# Patient Record
Sex: MALE | Race: WHITE | Employment: OTHER | URBAN - METROPOLITAN AREA
[De-identification: names, ages, dates, MRNs, and addresses within clinical notes are randomized per-mention and may not be internally consistent; named-entity substitution may affect disease eponyms.]

---

## 2017-01-11 ENCOUNTER — HOSPITAL ENCOUNTER (OUTPATIENT)
Dept: LAB | Age: 82
Discharge: HOME | End: 2017-01-11
Payer: MEDICARE

## 2017-01-11 PROCEDURE — 84439 ASSAY OF FREE THYROXINE: CPT

## 2017-01-11 PROCEDURE — 81001 URINALYSIS AUTO W/SCOPE: CPT

## 2017-01-11 PROCEDURE — 84443 ASSAY THYROID STIM HORMONE: CPT

## 2017-01-11 PROCEDURE — 36415 COLL VENOUS BLD VENIPUNCTURE: CPT

## 2017-01-11 PROCEDURE — 80053 COMPREHEN METABOLIC PANEL: CPT

## 2017-01-11 PROCEDURE — 80061 LIPID PANEL: CPT

## 2017-01-19 ENCOUNTER — TELEPHONE (OUTPATIENT)
Dept: INTERNAL MEDICINE CLINIC | Age: 82
End: 2017-01-19

## 2017-01-19 RX ORDER — LEVOTHYROXINE SODIUM 150 UG/1
150 TABLET ORAL
Qty: 90 TAB | Refills: 1 | Status: SHIPPED | OUTPATIENT
Start: 2017-01-19 | End: 2018-02-19 | Stop reason: SDUPTHER

## 2017-01-19 NOTE — TELEPHONE ENCOUNTER
----- Message from Praveen Story MD sent at 1/16/2017  9:23 PM EST -----  Increase the synthroid to 150,  labs in 6 months.

## 2017-01-19 NOTE — TELEPHONE ENCOUNTER
Spoke with pt and spouse in ref to recent lab results. Thyroid not at goal. Need to adjust levothyroxine from 125 mcg to 150 mcg daily and repeat labs in 6 months. Rx for new dose sent to pharm. I have reviewed the provider's instructions with the patient, answering all questions to his satisfaction. This medication was authorized by verbal order by Dr. Tobias Mireles.

## 2017-02-08 ENCOUNTER — TELEPHONE (OUTPATIENT)
Dept: INTERNAL MEDICINE CLINIC | Age: 82
End: 2017-02-08

## 2017-02-08 NOTE — TELEPHONE ENCOUNTER
Pt wife informed that Drew Martínez does not return until 2/16 and due to pt age will probably not recommend Ambien.

## 2017-02-08 NOTE — TELEPHONE ENCOUNTER
[phone] pt's wife says that her  is having some problems sleeping and would like dr Suraj Wagner to give him a rx for Lenalphonse Yolanda.

## 2017-02-17 ENCOUNTER — TELEPHONE (OUTPATIENT)
Dept: INTERNAL MEDICINE CLINIC | Age: 82
End: 2017-02-17

## 2017-02-20 NOTE — TELEPHONE ENCOUNTER
Spoke with pt spouse who is on HIPPA. 2 pt identifiers. Advised that [address] does not want pt to take Ambien due to age and increase risk of falls. To try Melatonin 1-2 mg QHS. Verbalized understanding.

## 2017-04-20 RX ORDER — PRAVASTATIN SODIUM 20 MG/1
TABLET ORAL
Qty: 90 TAB | Refills: 0 | Status: SHIPPED | OUTPATIENT
Start: 2017-04-20 | End: 2017-06-02 | Stop reason: SDUPTHER

## 2017-05-03 ENCOUNTER — TELEPHONE (OUTPATIENT)
Dept: INTERNAL MEDICINE CLINIC | Age: 82
End: 2017-05-03

## 2017-06-02 ENCOUNTER — OFFICE VISIT (OUTPATIENT)
Dept: INTERNAL MEDICINE CLINIC | Age: 82
End: 2017-06-02

## 2017-06-02 VITALS
OXYGEN SATURATION: 96 % | DIASTOLIC BLOOD PRESSURE: 68 MMHG | WEIGHT: 168.4 LBS | HEART RATE: 71 BPM | SYSTOLIC BLOOD PRESSURE: 112 MMHG | HEIGHT: 70 IN | BODY MASS INDEX: 24.11 KG/M2 | TEMPERATURE: 98.7 F | RESPIRATION RATE: 16 BRPM

## 2017-06-02 DIAGNOSIS — E78.00 HYPERCHOLESTEROLEMIA: ICD-10-CM

## 2017-06-02 DIAGNOSIS — C91.10 CLL (CHRONIC LYMPHOCYTIC LEUKEMIA) (HCC): ICD-10-CM

## 2017-06-02 DIAGNOSIS — I10 ESSENTIAL HYPERTENSION: ICD-10-CM

## 2017-06-02 DIAGNOSIS — R68.81 EARLY SATIETY: ICD-10-CM

## 2017-06-02 DIAGNOSIS — E03.4 HYPOTHYROIDISM DUE TO ACQUIRED ATROPHY OF THYROID: Primary | ICD-10-CM

## 2017-06-02 DIAGNOSIS — J06.9 UPPER RESPIRATORY TRACT INFECTION, UNSPECIFIED TYPE: ICD-10-CM

## 2017-06-02 DIAGNOSIS — K29.01 GASTROINTESTINAL HEMORRHAGE ASSOCIATED WITH ACUTE GASTRITIS: ICD-10-CM

## 2017-06-02 RX ORDER — MIRTAZAPINE 7.5 MG/1
7.5 TABLET, FILM COATED ORAL
Qty: 30 TAB | Refills: 1 | Status: SHIPPED | OUTPATIENT
Start: 2017-06-02 | End: 2017-11-17 | Stop reason: DRUGHIGH

## 2017-06-02 RX ORDER — MAGNESIUM 200 MG
1000 TABLET ORAL DAILY
COMMUNITY

## 2017-06-02 RX ORDER — PRAVASTATIN SODIUM 20 MG/1
TABLET ORAL
Qty: 90 TAB | Refills: 3 | Status: SHIPPED | OUTPATIENT
Start: 2017-06-02 | End: 2018-01-03 | Stop reason: SDUPTHER

## 2017-06-02 RX ORDER — ALBUTEROL SULFATE 90 UG/1
2 AEROSOL, METERED RESPIRATORY (INHALATION)
Qty: 1 INHALER | Refills: 0 | COMMUNITY
Start: 2017-06-02 | End: 2018-05-03 | Stop reason: SDUPTHER

## 2017-06-02 RX ORDER — GUAIFENESIN 600 MG/1
1200 TABLET, EXTENDED RELEASE ORAL 2 TIMES DAILY
COMMUNITY
Start: 2017-06-02 | End: 2017-07-28

## 2017-06-02 NOTE — PROGRESS NOTES
HPI:  Susanna Portillo is a 80y.o. year old male who is here for a followup visit. Ongoing issues with decreased appetite and weight down. Sleep is an issue off and on. One week of cough with little sputum. No fever or chills. No night sweats. No vomiting but decreased appetite. Bowels are good. No bleeding. No melena. No change in bowels or bladder. Some nocturia. Past Medical History:   Diagnosis Date    Asthma     Autoimmune disease ([facility]     CLL    Cancer ([facility] 6/20/14    MULTIPLE SCCAs EXCISED, ALSO BCCAs    CLL (chronic lymphocytic leukemia) (HCC)     no treatment    GERD (gastroesophageal reflux disease)     Hypercholesterolemia     Hypertension     Liver disease 6/6/2014    ADMITTED FOR LIVER DYSFUNCTION, NOT DIAGNOSED, NOW RESOLVED    [facility]    Thyroid disease     Unspecified adverse effect of anesthesia     \"out of it for 2 weeks\"    Unspecified sleep apnea     had sleep study but no follow up       Past Surgical History:   Procedure Laterality Date    HX ABDOMINAL LAPAROSCOPY  4/2016    HX COLONOSCOPY      X2, MOST RECNT WAS IN 2013    HX HEENT      tonsillectomy    HX HERNIA REPAIR  4/13/16    paraesophageal hernia repair Dr Ayana Walsh Left     Frozen shoulder manipulation       Prior to Admission medications    Medication Sig Start Date End Date Taking? Authorizing Provider   cyanocobalamin (VITAMIN B-12) 1,000 mcg sublingual tablet Take 1,000 mcg by mouth daily. Yes Historical Provider   pravastatin (PRAVACHOL) 20 mg tablet TAKE ONE TABLET BY MOUTH NIGHTLY 6/2/17  Yes Lori Woods III, MD   albuterol (PROVENTIL HFA, VENTOLIN HFA, PROAIR HFA) 90 mcg/actuation inhaler Take 2 Puffs by inhalation every six (6) hours as needed for Wheezing. 6/2/17  Yes Lori Woods III, MD   guaiFENesin ER (MUCINEX) 600 mg ER tablet Take 2 Tabs by mouth two (2) times a day.  6/2/17  Yes Lori Woods III, MD   mirtazapine (REMERON) 7.5 mg tablet Take 1 Tab by mouth nightly. 6/2/17  Yes Rachel Wyman III, MD   levothyroxine (SYNTHROID) 150 mcg tablet Take 1 Tab by mouth Daily (before breakfast). Dose adjustment 1/19/17  Yes Shazia Hannah MD   diphenoxylate-atropine (LOMOTIL) 2.5-0.025 mg per tablet Take  by mouth four (4) times daily as needed for Diarrhea. Yes Historical Provider   raNITIdine (ZANTAC 75) 75 mg tablet Take 75 mg by mouth nightly. Yes Historical Provider   hydrochlorothiazide (HYDRODIURIL) 25 mg tablet TAKE ONE TABLET BY MOUTH ONCE DAILY 6/6/16  Yes Rachel Wyman III, MD   amLODIPine (NORVASC) 10 mg tablet TAKE ONE TABLET BY MOUTH ONCE DAILY 6/6/16  Yes Rachel Wyman III, MD   aspirin delayed-release 81 mg tablet Take  by mouth daily. Yes Historical Provider   co-enzyme Q-10 (CO Q-10) 100 mg capsule Take 100 mg by mouth daily. Yes Historical Provider   calcium carbonate (TUMS) 200 mg calcium (500 mg) chew Take 1 Tab by mouth as needed (heartburn). Yes Historical Provider   MEN'S MULTI-VITAMIN PO Take 2 each by mouth daily. Yes Historical Provider   cholecalciferol, vitamin D3, (VITAMIN D3) 2,000 unit tab Take 1 tablet by mouth daily. Yes Historical Provider   colestipol (COLESTID) 1 gram tablet Take 1 g by mouth daily. 11/11/16   Historical Provider   OTHER RepairVite-SE 1 scoop daily for bowel support. Historical Provider   lactobacillus combination no.4 (PROBIOTIC) 3 billion cell cap Take  by mouth. Ultimate Melanie Probiotic 30 billion cx per cap - 1 pill per day. Historical Provider       Social History     Social History    Marital status:      Spouse name: N/A    Number of children: N/A    Years of education: N/A     Occupational History    Not on file.      Social History Main Topics    Smoking status: Never Smoker    Smokeless tobacco: Never Used    Alcohol use 2.5 oz/week     5 Standard drinks or equivalent per week      Comment: occasional    Drug use: No    Sexual activity: Not on file     Other Topics Concern    Not on file     Social History Narrative          ROS  Per HPI    Visit Vitals    /68    Pulse 71    Temp 98.7 °F (37.1 °C) (Oral)    Resp 16    Ht 5' 10\" (1.778 m)    Wt 168 lb 6.4 oz (76.4 kg)    SpO2 96%    BMI 24.16 kg/m2         Physical Exam   Physical Examination: General appearance - alert, well appearing, and in no distress  Mouth - mucous membranes moist, pharynx normal without lesions  Neck - supple, no significant adenopathy  Chest - clear to auscultation, no wheezes, rales or rhonchi, symmetric air entry  Heart - normal rate, regular rhythm, normal S1, S2, no murmurs, rubs, clicks or gallops  Abdomen - soft, nontender, nondistended, no masses or organomegaly  Neurological - alert, oriented, normal speech, no focal findings or movement disorder noted  Extremities - peripheral pulses normal, no pedal edema, no clubbing or cyanosis      Assessment/Plan:  Mike was seen today for cough and decreased appetite. Diagnoses and all orders for this visit:    Hypothyroidism due to acquired atrophy of thyroid - appears euthyroid except for weight. Check levels and adjust meds  -     TSH 3RD GENERATION  -     T4, FREE    CLL (chronic lymphocytic leukemia) (HCC) - recently had CBC with oncology    Hypercholesterolemia - check labs for LDL of 100  -     pravastatin (PRAVACHOL) 20 mg tablet; TAKE ONE TABLET BY MOUTH NIGHTLY  -     LIPID PANEL    Essential hypertension - Bp controlled. -     METABOLIC PANEL, COMPREHENSIVE  -     UA/M W/RFLX CULTURE, ROUTINE    Early satiety - will get UGI to see if his prior surgery is causing issues with stomach and emptying for followup. Trial of appetite stimulant for now. -     mirtazapine (REMERON) 7.5 mg tablet; Take 1 Tab by mouth nightly.   -     XR UPPER GI W KUB/ BA SWALLOW; Future    Gastrointestinal hemorrhage associated with acute gastritis  -     XR UPPER GI W KUB/ BA SWALLOW; Future    Upper respiratory tract infection, unspecified type - use mucinex as well as albuterol as needed for now. Call for fever. Follow-up Disposition: as needed -4-6 weeks. Advised him to call back or return to office if symptoms worsen/change/persist.  Discussed expected course/resolution/complications of diagnosis in detail with patient. Medication risks/benefits/costs/interactions/alternatives discussed with patient. He was given an after visit summary which includes diagnoses, current medications, & vitals. He expressed understanding with the diagnosis and plan.

## 2017-06-02 NOTE — MR AVS SNAPSHOT
Visit Information Date & Time Provider Department Dept. Phone Encounter #  
 6/2/2017 11:00 AM Suzanne Rosenthal MD [facility] Internal Medicine [phone] [id number] Upcoming Health Maintenance Date Due ZOSTER VACCINE AGE 60> 3/17/1991 INFLUENZA AGE 9 TO ADULT 8/1/2017 GLAUCOMA SCREENING Q2Y 12/16/2017 MEDICARE YEARLY EXAM 12/22/2017 DTaP/Tdap/Td series (2 - Td) 6/14/2026 Allergies as of 6/2/2017  Review Complete On: 6/2/2017 By: Alfred Fountain LPN Severity Noted Reaction Type Reactions Other Food  06/20/2014    Other (comments) AVOIDS OTHER SPICES IN GENERAL DUE SEVERE REACTIONS TO OREGANO AND MUSTARD Mustard High 06/20/2014    Anaphylaxis THROAT CLOSES Oregano High 06/20/2014    Anaphylaxis THROAT CLOSES Zithromax [Azithromycin] High 03/30/2011    Anaphylaxis Pcn [Penicillins]  03/30/2011    Unable to Obtain Current Immunizations  Reviewed on 8/25/2016 Name Date Influenza High Dose Vaccine PF 10/26/2016 Influenza Vaccine 10/15/2015, 10/22/2014, 10/1/2013 Pneumococcal Conjugate (PCV-13) 5/18/2015 Pneumococcal Vaccine (Unspecified Type) 10/1/2011 Tdap 6/14/2016 Not reviewed this visit You Were Diagnosed With   
  
 Codes Comments Hypothyroidism due to acquired atrophy of thyroid    -  Primary ICD-10-CM: E03.4 ICD-9-CM: 244.8, 246.8 CLL (chronic lymphocytic leukemia) (HCC)     ICD-10-CM: C91.10 ICD-9-CM: 204.10 Hypercholesterolemia     ICD-10-CM: E78.00 ICD-9-CM: 272.0 Essential hypertension     ICD-10-CM: I10 
ICD-9-CM: 401.9 Early satiety     ICD-10-CM: R68.81 ICD-9-CM: 780.94 Gastrointestinal hemorrhage associated with acute gastritis     ICD-10-CM: K29.01 
ICD-9-CM: 535.01 Upper respiratory tract infection, unspecified type     ICD-10-CM: J06.9 ICD-9-CM: 465.9 Vitals BP Pulse Temp Resp Height(growth percentile) Weight(growth percentile) 112/68 71 98.7 °F (37.1 °C) (Oral) 16 5' 10\" (1.778 m) 168 lb 6.4 oz (76.4 kg) SpO2 BMI Smoking Status 96% 24.16 kg/m2 Never Smoker Vitals History BMI and BSA Data Body Mass Index Body Surface Area  
 24.16 kg/m 2 1.94 m 2 Preferred Pharmacy Pharmacy Name Phone [phone] [phone] [facility] [phone] Your Updated Medication List  
  
   
This list is accurate as of: 6/2/17 11:48 AM.  Always use your most recent med list.  
  
  
  
  
 albuterol 90 mcg/actuation inhaler Commonly known as:  PROVENTIL HFA, VENTOLIN HFA, PROAIR HFA Take 2 Puffs by inhalation every six (6) hours as needed for Wheezing. amLODIPine 10 mg tablet Commonly known as:  Ingebethanie Denise TAKE ONE TABLET BY MOUTH ONCE DAILY  
  
 aspirin delayed-release 81 mg tablet Take  by mouth daily. calcium carbonate 200 mg calcium (500 mg) Chew Commonly known as:  TUMS Take 1 Tab by mouth as needed (heartburn). co-enzyme Q-10 100 mg capsule Commonly known as:  CO Q-10 Take 100 mg by mouth daily. colestipol 1 gram tablet Commonly known as:  COLESTID Take 1 g by mouth daily. guaiFENesin  mg ER tablet Commonly known as:  [employer] Take 2 Tabs by mouth two (2) times a day. hydroCHLOROthiazide 25 mg tablet Commonly known as:  HYDRODIURIL  
TAKE ONE TABLET BY MOUTH ONCE DAILY  
  
 levothyroxine 150 mcg tablet Commonly known as:  SYNTHROID Take 1 Tab by mouth Daily (before breakfast). Dose adjustment LOMOTIL 2.5-0.025 mg per tablet Generic drug:  diphenoxylate-atropine Take  by mouth four (4) times daily as needed for Diarrhea. MEN'S MULTI-VITAMIN PO Take 2 each by mouth daily. mirtazapine 7.5 mg tablet Commonly known as:  Sharyle Meadows Take 1 Tab by mouth nightly. OTHER RepairVite-SE 1 scoop daily for bowel support. pravastatin 20 mg tablet Commonly known as:  PRAVACHOL  
 TAKE ONE TABLET BY MOUTH NIGHTLY PROBIOTIC 3 billion cell Cap Generic drug:  lactobacillus combination no.4 Take  by mouth. Ultimate Melanie Probiotic 30 billion cx per cap - 1 pill per day. VITAMIN B-12 1,000 mcg sublingual tablet Generic drug:  cyanocobalamin Take 1,000 mcg by mouth daily. VITAMIN D3 2,000 unit Tab Generic drug:  cholecalciferol (vitamin D3) Take 1 tablet by mouth daily. ZANTAC 75 75 mg tablet Generic drug:  raNITIdine Take 75 mg by mouth nightly. Prescriptions Sent to Pharmacy Refills  
 pravastatin (PRAVACHOL) 20 mg tablet 3 Sig: TAKE ONE TABLET BY MOUTH NIGHTLY Class: Normal  
 Pharmacy: [address] Ph #: [phone]  
 mirtazapine (REMERON) 7.5 mg tablet 1 Sig: Take 1 Tab by mouth nightly. Class: Normal  
 Pharmacy: [address] [place] [address], [address] [place] [address] Ph #: [phone] Route: Oral  
  
We Performed the Following LIPID PANEL [95321 CPT(R)] METABOLIC PANEL, COMPREHENSIVE [26063 CPT(R)] T4, FREE [id number] CPT(R)] TSH 3RD GENERATION [68923 CPT(R)] UA/M W/RFLX CULTURE, ROUTINE [[id number] Custom] To-Do List   
 06/02/2017 Imaging:  XR UPPER GI W KUB/ BA SWALLOW Introducing [facility]! Dear Isadora Anthony: Thank you for requesting a [employer] account. Our records indicate that you already have an active [employer] account. You can access your account anytime at [email or web address] Did you know that you can access your hospital and ER discharge instructions at any time in [employer]? You can also review all of your test results from your hospital stay or ER visit. Additional Information If you have questions, please visit the Frequently Asked Questions section of the [employer] website at [email or web address] Remember, [employer] is NOT to be used for urgent needs.  For medical emergencies, dial 911. Now available from your iPhone and Android! Please provide this summary of care documentation to your next provider. Your primary care clinician is listed as Alee [phone] If you have any questions after today's visit, please call [phone].

## 2017-06-07 ENCOUNTER — HOSPITAL ENCOUNTER (OUTPATIENT)
Dept: GENERAL RADIOLOGY | Age: 82
Discharge: HOME | End: 2017-06-07
Attending: INTERNAL MEDICINE
Payer: MEDICARE

## 2017-06-07 DIAGNOSIS — R68.81 EARLY SATIETY: ICD-10-CM

## 2017-06-07 DIAGNOSIS — K29.01 GASTROINTESTINAL HEMORRHAGE ASSOCIATED WITH ACUTE GASTRITIS: ICD-10-CM

## 2017-06-07 PROCEDURE — 74241 XR UPPER GI W KUB/ BA SWALLOW: CPT

## 2017-06-08 ENCOUNTER — TELEPHONE (OUTPATIENT)
Dept: INTERNAL MEDICINE CLINIC | Age: 82
End: 2017-06-08

## 2017-06-09 ENCOUNTER — TELEPHONE (OUTPATIENT)
Dept: INTERNAL MEDICINE CLINIC | Age: 82
End: 2017-06-09

## 2017-06-09 ENCOUNTER — HOSPITAL ENCOUNTER (OUTPATIENT)
Dept: LAB | Age: 82
Discharge: HOME | End: 2017-06-09
Payer: MEDICARE

## 2017-06-09 DIAGNOSIS — K21.9 GASTRIC REFLUX: ICD-10-CM

## 2017-06-09 DIAGNOSIS — R63.4 WEIGHT LOSS: Primary | ICD-10-CM

## 2017-06-09 DIAGNOSIS — R68.81 EARLY SATIETY: ICD-10-CM

## 2017-06-09 PROCEDURE — 80061 LIPID PANEL: CPT

## 2017-06-09 PROCEDURE — 80053 COMPREHEN METABOLIC PANEL: CPT

## 2017-06-09 PROCEDURE — 81001 URINALYSIS AUTO W/SCOPE: CPT

## 2017-06-09 PROCEDURE — 84439 ASSAY OF FREE THYROXINE: CPT

## 2017-06-09 PROCEDURE — 84443 ASSAY THYROID STIM HORMONE: CPT

## 2017-06-09 NOTE — TELEPHONE ENCOUNTER
Pt's wife called for her  to see if the results of his barium xray have come back. He is anxious to hear before the weekend.

## 2017-07-12 ENCOUNTER — TELEPHONE (OUTPATIENT)
Dept: INTERNAL MEDICINE CLINIC | Age: 82
End: 2017-07-12

## 2017-07-12 NOTE — TELEPHONE ENCOUNTER
Please call regarding results to his stomach x-ray. a speech therapist was suppose to be set up for him and never was.  She would like to discuss the information with you

## 2017-07-13 LAB
ALBUMIN SERPL-MCNC: 3.9 G/DL (ref 3.5–4.7)
ALBUMIN/GLOB SERPL: 1.8 {RATIO} (ref 1.2–2.2)
ALP SERPL-CCNC: 70 IU/L (ref 39–117)
ALT SERPL-CCNC: 18 IU/L (ref 0–44)
APPEARANCE UR: CLEAR
AST SERPL-CCNC: 27 IU/L (ref 0–40)
BACTERIA #/AREA URNS HPF: ABNORMAL /[HPF]
BILIRUB SERPL-MCNC: 0.3 MG/DL (ref 0–1.2)
BILIRUB UR QL STRIP: NEGATIVE
BUN SERPL-MCNC: 20 MG/DL (ref 8–27)
BUN/CREAT SERPL: 22 (ref 10–24)
CALCIUM SERPL-MCNC: 9.5 MG/DL (ref 8.6–10.2)
CASTS URNS QL MICRO: ABNORMAL /LPF
CHLORIDE SERPL-SCNC: 97 MMOL/L (ref 96–106)
CHOLEST SERPL-MCNC: 118 MG/DL (ref 100–199)
CO2 SERPL-SCNC: 25 MMOL/L (ref 18–29)
COLOR UR: YELLOW
CREAT SERPL-MCNC: 0.93 MG/DL (ref 0.76–1.27)
CRYSTALS URNS MICRO: ABNORMAL
EPI CELLS #/AREA URNS HPF: ABNORMAL /HPF
GLOBULIN SER CALC-MCNC: 2.2 G/DL (ref 1.5–4.5)
GLUCOSE SERPL-MCNC: 96 MG/DL (ref 65–99)
GLUCOSE UR QL: NEGATIVE
HDLC SERPL-MCNC: 52 MG/DL
HGB UR QL STRIP: NEGATIVE
KETONES UR QL STRIP: NEGATIVE
LDLC SERPL CALC-MCNC: 54 MG/DL (ref 0–99)
LEUKOCYTE ESTERASE UR QL STRIP: NEGATIVE
MICRO URNS: NORMAL
MICRO URNS: NORMAL
MUCOUS THREADS URNS QL MICRO: PRESENT
NITRITE UR QL STRIP: NEGATIVE
PH UR STRIP: 6.5 [PH] (ref 5–7.5)
POTASSIUM SERPL-SCNC: 4.1 MMOL/L (ref 3.5–5.2)
PROT SERPL-MCNC: 6.1 G/DL (ref 6–8.5)
PROT UR QL STRIP: NEGATIVE
RBC #/AREA URNS HPF: ABNORMAL /HPF
SODIUM SERPL-SCNC: 139 MMOL/L (ref 134–144)
SP GR UR: 1.03 (ref 1–1.03)
T4 FREE SERPL-MCNC: 1.38 NG/DL (ref 0.82–1.77)
TRIGL SERPL-MCNC: 61 MG/DL (ref 0–149)
TSH SERPL DL<=0.005 MIU/L-ACNC: 2.22 UIU/ML (ref 0.45–4.5)
UNIDENT CRYS URNS QL MICRO: PRESENT
URINALYSIS REFLEX, 377202: NORMAL
UROBILINOGEN UR STRIP-MCNC: 0.2 MG/DL (ref 0.2–1)
VLDLC SERPL CALC-MCNC: 12 MG/DL (ref 5–40)
WBC #/AREA URNS HPF: ABNORMAL /HPF

## 2017-07-13 NOTE — TELEPHONE ENCOUNTER
Spoke with pts spouse who is on HIPPA. 2 pt identifiers. I explained that I have faxed pt information to [address] [place] [address] for a speech therapy eval with confirmation received. She will contact me if she does not hear from them.

## 2017-07-24 ENCOUNTER — TELEPHONE (OUTPATIENT)
Dept: INTERNAL MEDICINE CLINIC | Age: 82
End: 2017-07-24

## 2017-07-24 DIAGNOSIS — I10 ESSENTIAL HYPERTENSION WITH GOAL BLOOD PRESSURE LESS THAN 140/90: ICD-10-CM

## 2017-07-24 RX ORDER — HYDROCHLOROTHIAZIDE 25 MG/1
TABLET ORAL
Qty: 90 TAB | Refills: 0 | Status: SHIPPED | OUTPATIENT
Start: 2017-07-24 | End: 2018-01-03 | Stop reason: SDUPTHER

## 2017-07-24 NOTE — TELEPHONE ENCOUNTER
The patient requested a copy of The study results of the  Barium x-ray  mailed to her only page 1 was sent please mailed page 2 3 and 4    Also thank you for the speech therapy it is working out well

## 2017-07-28 ENCOUNTER — TELEPHONE (OUTPATIENT)
Dept: INTERNAL MEDICINE CLINIC | Age: 82
End: 2017-07-28

## 2017-07-28 ENCOUNTER — OFFICE VISIT (OUTPATIENT)
Dept: INTERNAL MEDICINE CLINIC | Age: 82
End: 2017-07-28

## 2017-07-28 VITALS
DIASTOLIC BLOOD PRESSURE: 82 MMHG | RESPIRATION RATE: 16 BRPM | OXYGEN SATURATION: 99 % | HEART RATE: 76 BPM | TEMPERATURE: 98 F | BODY MASS INDEX: 24.45 KG/M2 | WEIGHT: 170.8 LBS | SYSTOLIC BLOOD PRESSURE: 120 MMHG | HEIGHT: 70 IN

## 2017-07-28 DIAGNOSIS — C91.10 CLL (CHRONIC LYMPHOCYTIC LEUKEMIA) (HCC): ICD-10-CM
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
## 2017-07-28 DIAGNOSIS — I10 ESSENTIAL HYPERTENSION: ICD-10-CM

## 2017-07-28 DIAGNOSIS — R68.89 SUSPECTED ERYTHEMA CHRONICA MIGRANS: Primary | ICD-10-CM

## 2017-07-28 RX ORDER — DOXYCYCLINE 100 MG/1
100 TABLET ORAL 2 TIMES DAILY
Qty: 20 TAB | Refills: 0 | Status: SHIPPED | OUTPATIENT
Start: 2017-07-28 | End: 2017-12-26

## 2017-07-28 NOTE — PROGRESS NOTES
HISTORY OF PRESENT ILLNESS  Allie Zhang is a 80 y.o. male. HPI  About 2 weeks ago was at the [facility]", and removed a tick from his  left popliteal area. He is not sure how long the tick was attached, but it is not unusual for him to remove ticks when he is at the [facility]". About 3 days ago noticed a non pruritic rash on his left anterior thigh. He is not sure if it has been enlarging. He otherwise feels well. He denies fevers, headaches, myalgias or joint pain. He has CLL, and his wife is concerned about his immune system being able to fight an infection. She also has questions about a referral to speech therapy for treatment of  chronic aspiration, and will check with Dr. Liliane Friedman nurse about this. Past Medical History:   Diagnosis Date    Asthma     Autoimmune disease ([facility]     CLL    Cancer ([facility] 6/20/14    MULTIPLE SCCAs EXCISED, ALSO BCCAs    CLL (chronic lymphocytic leukemia) (HCC)     no treatment    GERD (gastroesophageal reflux disease)     Hypercholesterolemia     Hypertension     Liver disease 6/6/2014    ADMITTED FOR LIVER DYSFUNCTION, NOT DIAGNOSED, NOW RESOLVED    [facility]    Thyroid disease     Unspecified adverse effect of anesthesia     \"out of it for 2 weeks\"    Unspecified sleep apnea     had sleep study but no follow up      Current Outpatient Prescriptions on File Prior to Visit   Medication Sig Dispense Refill    hydroCHLOROthiazide (HYDRODIURIL) 25 mg tablet TAKE ONE TABLET BY MOUTH ONCE DAILY 90 Tab 0    cyanocobalamin (VITAMIN B-12) 1,000 mcg sublingual tablet Take 1,000 mcg by mouth daily.  pravastatin (PRAVACHOL) 20 mg tablet TAKE ONE TABLET BY MOUTH NIGHTLY 90 Tab 3    levothyroxine (SYNTHROID) 150 mcg tablet Take 1 Tab by mouth Daily (before breakfast). Dose adjustment 90 Tab 1    raNITIdine (ZANTAC 75) 75 mg tablet Take 150 mg by mouth nightly.       amLODIPine (NORVASC) 10 mg tablet TAKE ONE TABLET BY MOUTH ONCE DAILY 90 Tab 4    aspirin delayed-release 81 mg tablet Take  by mouth daily.  co-enzyme Q-10 (CO Q-10) 100 mg capsule Take 100 mg by mouth daily.  MEN'S MULTI-VITAMIN PO Take 2 each by mouth daily.  cholecalciferol, vitamin D3, (VITAMIN D3) 2,000 unit tab Take 1 tablet by mouth daily.  albuterol (PROVENTIL HFA, VENTOLIN HFA, PROAIR HFA) 90 mcg/actuation inhaler Take 2 Puffs by inhalation every six (6) hours as needed for Wheezing. 1 Inhaler 0    mirtazapine (REMERON) 7.5 mg tablet Take 1 Tab by mouth nightly. 30 Tab 1    colestipol (COLESTID) 1 gram tablet Take 1 g by mouth daily.  OTHER RepairVite-SE 1 scoop daily for bowel support.  lactobacillus combination no.4 (PROBIOTIC) 3 billion cell cap Take  by mouth. Ultimate Melanie Probiotic 30 billion cx per cap - 1 pill per day.  diphenoxylate-atropine (LOMOTIL) 2.5-0.025 mg per tablet Take  by mouth four (4) times daily as needed for Diarrhea.  calcium carbonate (TUMS) 200 mg calcium (500 mg) chew Take 1 Tab by mouth as needed (heartburn). No current facility-administered medications on file prior to visit. Allergies   Allergen Reactions    Other Food Other (comments)     AVOIDS OTHER SPICES IN GENERAL DUE SEVERE REACTIONS TO OREGANO AND MUSTARD    Mustard Anaphylaxis     THROAT CLOSES    Oregano Anaphylaxis     THROAT CLOSES    Zithromax [Azithromycin] Anaphylaxis    Pcn [Penicillins] Unable to Obtain       ROS  Per HPI  Physical Exam   Visit Vitals    /82 (BP 1 Location: Right arm, BP Patient Position: Sitting)    Pulse 76    Temp 98 °F (36.7 °C)    Resp 16    Ht 5' 10\" (1.778 m)    Wt 170 lb 12.8 oz (77.5 kg)    SpO2 99%    BMI 24.51 kg/m2      Heart[de-identified] normal rate, regular rhythm, normal S1, S2, no murmurs, rubs, clicks or gallops.   Chest: clear to auscultation, no wheezes, rales or rhonchi,   Extremities: no edema  Left anterior thigh: with eyrthematous annular plaque with central clearing, scaling present on rash, and also on unaffected skin         ASSESSMENT and PLAN  TIck exposure   \"B[place] Eye\" rash, typical for Erythema Chronica Migrans  Doxycycline 100 mg bid x 10 days  Reviewed use and side effects   Hypertension: well controlled  CLL      Follow-up Disposition:  Return if symptoms worsen or fail to improve. Advised to call back or return to office if symptoms worsen/change/persist.  Discussed expected course/resolution/complications of diagnosis in detail with patient. Medication risks/benefits/costs/interactions/alternatives discussed with patient. He was given an after visit summary which includes diagnoses, current medications, & vitals. He expressed understanding with the diagnosis and plan.

## 2017-07-28 NOTE — MR AVS SNAPSHOT
Visit Information Date & Time Provider Department Dept. Phone Encounter #  
 7/28/2017 11:45 AM Konrad Palomino, [address] [facility] [phone] [id number] Upcoming Health Maintenance Date Due ZOSTER VACCINE AGE 60> 1/17/1991 INFLUENZA AGE 9 TO ADULT 8/1/2017 GLAUCOMA SCREENING Q2Y 12/16/2017 MEDICARE YEARLY EXAM 12/22/2017 DTaP/Tdap/Td series (2 - Td) 6/14/2026 Allergies as of 7/28/2017  Review Complete On: 7/28/2017 By: Gladys Chandler RN Severity Noted Reaction Type Reactions Other Food  06/20/2014    Other (comments) AVOIDS OTHER SPICES IN GENERAL DUE SEVERE REACTIONS TO OREGANO AND MUSTARD Mustard High 06/20/2014    Anaphylaxis THROAT CLOSES Oregano High 06/20/2014    Anaphylaxis THROAT CLOSES Zithromax [Azithromycin] High 03/30/2011    Anaphylaxis Pcn [Penicillins]  03/30/2011    Unable to Obtain Current Immunizations  Reviewed on 8/25/2016 Name Date Influenza High Dose Vaccine PF 10/26/2016 Influenza Vaccine 10/15/2015, 10/22/2014, 10/1/2013 Pneumococcal Conjugate (PCV-13) 5/18/2015 Pneumococcal Vaccine (Unspecified Type) 10/1/2011 Tdap 6/14/2016 Not reviewed this visit Vitals BP Pulse Temp Resp Height(growth percentile) Weight(growth percentile) 120/82 (BP 1 Location: Right arm, BP Patient Position: Sitting) 76 98 °F (36.7 °C) 16 5' 10\" (1.778 m) 170 lb 12.8 oz (77.5 kg) SpO2 BMI Smoking Status 99% 24.51 kg/m2 Never Smoker BMI and BSA Data Body Mass Index Body Surface Area 24.51 kg/m 2 1.96 m 2 Preferred Pharmacy Pharmacy Name Phone [facility] [phone] [facility] [phone] Your Updated Medication List  
  
   
This list is accurate as of: 7/28/17 12:17 PM.  Always use your most recent med list.  
  
  
  
  
 albuterol 90 mcg/actuation inhaler Commonly known as:  PROVENTIL HFA, VENTOLIN HFA, PROAIR HFA Take 2 Puffs by inhalation every six (6) hours as needed for Wheezing. amLODIPine 10 mg tablet Commonly known as:  [place] Pall TAKE ONE TABLET BY MOUTH ONCE DAILY  
  
 aspirin delayed-release 81 mg tablet Take  by mouth daily. bismuth subsalicylate 111 DE/72 mL Susp Commonly known as:  PEPTO-BISMOL MAXIMUM STRENGTH Take 30 mL by mouth every six (6) hours as needed. calcium carbonate 200 mg calcium (500 mg) Chew Commonly known as:  TUMS Take 1 Tab by mouth as needed (heartburn). co-enzyme Q-10 100 mg capsule Commonly known as:  CO Q-10 Take 100 mg by mouth daily. colestipol 1 gram tablet Commonly known as:  COLESTID Take 1 g by mouth daily. doxycycline 100 mg tablet Commonly known as:  ADOXA Take 1 Tab by mouth two (2) times a day. hydroCHLOROthiazide 25 mg tablet Commonly known as:  HYDRODIURIL  
TAKE ONE TABLET BY MOUTH ONCE DAILY  
  
 levothyroxine 150 mcg tablet Commonly known as:  SYNTHROID Take 1 Tab by mouth Daily (before breakfast). Dose adjustment LOMOTIL 2.5-0.025 mg per tablet Generic drug:  diphenoxylate-atropine Take  by mouth four (4) times daily as needed for Diarrhea. MEN'S MULTI-VITAMIN PO Take 2 each by mouth daily. mirtazapine 7.5 mg tablet Commonly known as:  Devan Nakayama Take 1 Tab by mouth nightly. OTHER RepairVite-SE 1 scoop daily for bowel support. pravastatin 20 mg tablet Commonly known as:  PRAVACHOL  
TAKE ONE TABLET BY MOUTH NIGHTLY PROBIOTIC 3 billion cell Cap Generic drug:  lactobacillus combination no.4 Take  by mouth. Ultimate Melanie Probiotic 30 billion cx per cap - 1 pill per day. VITAMIN B-12 1,000 mcg sublingual tablet Generic drug:  cyanocobalamin Take 1,000 mcg by mouth daily. VITAMIN D3 2,000 unit Tab Generic drug:  cholecalciferol (vitamin D3) Take 1 tablet by mouth daily. ZANTAC 75 75 mg tablet Generic drug:  raNITIdine Take 150 mg by mouth nightly. Prescriptions Sent to Pharmacy Refills  
 doxycycline (ADOXA) 100 mg tablet 0 Sig: Take 1 Tab by mouth two (2) times a day. Class: Normal  
 Pharmacy: [facility] [address] [facility] #: [phone] Route: Oral  
  
Patient Instructions Doxycycline twice daily x 10 days, with food Hold your Calcium while taking the Doxycycline Call or return to clinic if these symptoms worsen or fail to improve as anticipated. Introducing [facility] SERVICES! Dear Orflo Stake: Thank you for requesting a [employer]AMINTOIT account. Our records indicate that you already have an active IAMINTOIT account. You can access your account anytime at [email or web address] Did you know that you can access your hospital and ER discharge instructions at any time in IAMINTOIT? You can also review all of your test results from your hospital stay or ER visit. Additional Information If you have questions, please visit the Frequently Asked Questions section of the I[employer]INTOIT website at [email or web address] Remember, IAMINTOIT is NOT to be used for urgent needs. For medical emergencies, dial 911. Now available from your iPhone and Android! Please provide this summary of care documentation to your next provider. Your primary care clinician is listed as Alee [phone] If you have any questions after today's visit, please call [phone].

## 2017-07-28 NOTE — PATIENT INSTRUCTIONS
Doxycycline twice daily x 10 days, with food  Hold your Calcium while taking the Doxycycline  Call or return to clinic if these symptoms worsen or fail to improve as anticipated.

## 2017-07-31 ENCOUNTER — TELEPHONE (OUTPATIENT)
Dept: INTERNAL MEDICINE CLINIC | Age: 82
End: 2017-07-31

## 2017-07-31 DIAGNOSIS — Z87.19 H/O HERNIA REPAIR: ICD-10-CM

## 2017-07-31 DIAGNOSIS — K21.9 GASTRIC REFLUX: ICD-10-CM

## 2017-07-31 DIAGNOSIS — R13.11 DYSPHAGIA, ORAL PHASE: Primary | ICD-10-CM

## 2017-07-31 DIAGNOSIS — T17.908D ASPIRATION INTO AIRWAY, SUBSEQUENT ENCOUNTER: ICD-10-CM

## 2017-07-31 DIAGNOSIS — Z98.890 H/O HERNIA REPAIR: ICD-10-CM

## 2017-07-31 DIAGNOSIS — R68.81 EARLY SATIETY: ICD-10-CM

## 2017-07-31 DIAGNOSIS — K44.9 HIATAL HERNIA: ICD-10-CM

## 2017-07-31 NOTE — TELEPHONE ENCOUNTER
Order for barium swallow faxed to Mendez Muñiz at [facility] to verify if speech therapy has made progress.

## 2017-08-03 ENCOUNTER — TELEPHONE (OUTPATIENT)
Dept: INTERNAL MEDICINE CLINIC | Age: 82
End: 2017-08-03

## 2017-08-03 NOTE — TELEPHONE ENCOUNTER
magui at [facility]'s out pt radiology [phone]     Requesting a call back regarding barium swallow test pt is scheduled for tomorrow, want to know which test  wants done first.

## 2017-08-04 ENCOUNTER — HOSPITAL ENCOUNTER (OUTPATIENT)
Dept: GENERAL RADIOLOGY | Age: 82
Discharge: HOME | End: 2017-08-04
Attending: INTERNAL MEDICINE
Payer: MEDICARE

## 2017-08-04 DIAGNOSIS — R13.11 DYSPHAGIA, ORAL PHASE: ICD-10-CM

## 2017-08-04 PROCEDURE — G8997 SWALLOW GOAL STATUS: HCPCS

## 2017-08-04 PROCEDURE — G8998 SWALLOW D/C STATUS: HCPCS

## 2017-08-04 PROCEDURE — G8996 SWALLOW CURRENT STATUS: HCPCS

## 2017-08-04 PROCEDURE — 74230 X-RAY XM SWLNG FUNCJ C+: CPT

## 2017-08-04 PROCEDURE — 92611 MOTION FLUOROSCOPY/SWALLOW: CPT | Performed by: SPEECH-LANGUAGE PATHOLOGIST

## 2017-08-04 NOTE — PROGRESS NOTES
[address] [place] [address]    Speech Pathology Modified barium swallow Study with cms g codes  Patient: Giovany Grey (48 y.o. male)  Date: 8/4/2017  Referring Provider:  Dr. Faustin Dolph:   Patient alert, cooperative. Referred for MBS study after aspiration was observed on UGI study. Reviewed images of UGI and note aspiration during the swallow. Also noted moderate reflux and hiatal hernia. Patient and wife report wet vocal quality with eating/drinking that has been going on for some time now. Recently evaluated by OP SLP with recommendation for MBS study to assess oropharyngeal dysphagia. OBJECTIVE:   Past Medical History:   Past Medical History:   Diagnosis Date    Asthma     Autoimmune disease ([facility]     CLL    Cancer ([facility] [facility] 6/20/14    MULTIPLE SCCAs EXCISED, ALSO BCCAs    CLL (chronic lymphocytic leukemia) (HCC)     no treatment    GERD (gastroesophageal reflux disease)     Hypercholesterolemia     Hypertension     Liver disease 6/6/2014    ADMITTED FOR LIVER DYSFUNCTION, NOT DIAGNOSED, NOW RESOLVED    Pulmonary embolism ([facility] [address] [facility] 9/2015    Thyroid disease     Unspecified adverse effect of anesthesia     \"out of it for 2 weeks\"    Unspecified sleep apnea     had sleep study but no follow up     Past Surgical History:   Procedure Laterality Date    HX ABDOMINAL LAPAROSCOPY  4/2016    HX COLONOSCOPY      X2, MOST RECNT WAS IN 2013    HX HEENT      tonsillectomy    HX HERNIA REPAIR  4/13/16    paraesophageal hernia repair Dr Gordillo Portillo HX ORTHOPAEDIC Left     Frozen shoulder manipulation     Current Dietary Status:  Regular   Radiologist: Dr. Stewart Essex: Lateral;Fluoro  Patient Position: standing     Trial 1: Trial 2:   Consistency Presented: Thin liquid Consistency Presented: Nectar thick liquid;Puree; Solid   How Presented: Cup/sip; Successive swallows How Presented: Self-fed/presented;Cup/sip;Cup/gulp; Successive swallows;Spoon   Consistency Amount:  (150cc thin Ba) Consistency Amount:  (200cc nectar thick Ba, 1 tbsp Ba paste )   Bolus Acceptance: No impairment Bolus Acceptance: No impairment   Bolus Formation/Control: No impairment:   Bolus Formation/Control: No impairment:     Propulsion: No impairment Propulsion: No impairment   Oral Residue: None Oral Residue: None   Initiation of Swallow: Triggered at vallecula Initiation of Swallow: Triggered at valleculae   Timing: No impairment Timing: No impairment   Penetration: During swallow; After swallow; To cords Penetration: None   Aspiration/Timing: Silent ;Repeated;During; After Aspiration/Timing: No evidence of aspiration   Pharyngeal Clearance: Vallecular residue;Pyriform residue ;10-50% Pharyngeal Clearance: 10-50%   Attempted Modifications: Double swallow Attempted Modifications: Double swallow   Effective Modifications:  (cued cough inconsistently cleared aspiration ) Effective Modifications: Double swallow (reduced but did not clear residue )   Decreased Tongue Base Retraction?: Yes  Laryngeal Elevation: Incomplete laryngeal closure; Inadequate epiglottic inversion; Reduced excursion with laryngeal vestibule gap  Aspiration/Penetration Score: 8 (Aspiration-Contrast passes cords/glottis with no effort to eject, ie/silent aspiration)  Pharyngeal Symmetry: Not assessed  Pharyngeal-Esophageal Segment: No impairment  Pharyngeal Dysfunction: Decreased tongue base retraction;Decreased strength;Decreased elevation/closure    Oral Phase Severity: No impairment  Pharyngeal Phase Severity: Mild moderate    In compliance with CMSs Claims Based Outcome Reporting, the following G-code set was chosen for this patient based the use of the NOMS functional outcome to quantify this patient's level of swallowing impairment. Using the NOMS, the patient was determined to be at level 5 for swallow function which correlates with the CJ= 20-39% level of severity.     Based on the objective assessment provided within this note, the current, goal, and discharge g-codes are as follows:    Swallow  Swallowing:   Swallow Current Status CJ= 20-39%   Swallow Goal Status CJ= 20-39%   Swallow D/C Status CJ= 20-39%        NOMS Swallowing Levels:  Level 1 (CN): NPO  Level 2 (CM): NPO but takes consistency in therapy  Level 3 (CL): Takes less than 50% of nutrition p.o. and continues with nonoral feedings; and/or safe with mod cues; and/or max diet restriction  Level 4 (CK): Safe swallow but needs mod cues; and/or mod diet restriction; and/or still requires some nonoral feeding/supplements  Level 5 (CJ): Safe swallow with min diet restriction; and/or needs min cues  Level 6 (CI): Independent with p.o.; rare cues; usually self cues; may need to avoid some foods or needs extra time  Level 7 ([address] [place] [address]): Independent for all p.o.  LUIS FERNANDO. (2003). National Outcomes Measurement System (NOMS): Adult Speech-Language Pathology User's Guide. ASSESSMENT :  Based on the objective data described above, the patient presents with mild/moderate pharyngeal dysphagia characterized by overall decreased strength and sensation. Patient with mildly delayed swallow initiation at the valleculae. Mildly reduced tongue base retraction, incomplete epiglottic inversion, decreased laryngeal elevation and anterior hyoid movement resulting in decreased laryngeal closure. Initially with trace penetration with thin liquids. With increasing trials, consistent silent aspiration was observed during the swallow secondary to decreased laryngeal closure as well as after the swallow secondary to pharyngeal residue. Throat clear was noted x1 in response to aspiration. Wet vocal quality was noted after aspiration. Tolerated nectar thick liquids, purees, and solids without penetration or aspiration. Mild/moderate vallecular and pyriform residue after the swallow. Reduced but never fully cleared with additional swallows. PLAN/RECOMMENDATIONS :  --regular solids, nectar thickened liquids. Reviewed with patient and wife at length recommendations, where to purchase thickening powder and how to thicken liquids including patient's favorite drinks. Provided written instructions on how and where to purchase powder   --patient will benefit from SLP OP follow up for dysphagia treatment to address laryngeal strengthening exercises and for further education on diet modifications and food preparation. --repeat MBS prior to diet upgrade given silent aspiration     COMMUNICATION/EDUCATION:   The above findings and recommendations were discussed with: patient and wife at length who verbalized understanding. Called and left message for treating SLP     Thank you for this referral  Radha Olmstead M.CD.  CCC-SLP   Time Calculation: 25 mins

## 2017-08-11 ENCOUNTER — TELEPHONE (OUTPATIENT)
Dept: INTERNAL MEDICINE CLINIC | Age: 82
End: 2017-08-11

## 2017-08-11 NOTE — TELEPHONE ENCOUNTER
[phone] pt's wife calling regarding the speech therapy her  is doing, she says that he has to drink thick liquids, even water and she understands that because pts do not like having to drink thick water and things that they may become dehydrated. She says that he is also on a diuretic and she wonders if maybe he could be taken off of the diuretic.

## 2017-09-18 DIAGNOSIS — I10 ESSENTIAL HYPERTENSION WITH GOAL BLOOD PRESSURE LESS THAN 140/90: ICD-10-CM

## 2017-09-18 RX ORDER — AMLODIPINE BESYLATE 10 MG/1
TABLET ORAL
Qty: 90 TAB | Refills: 4 | Status: SHIPPED | OUTPATIENT
Start: 2017-09-18 | End: 2018-12-06 | Stop reason: SDUPTHER

## 2017-09-28 ENCOUNTER — TELEPHONE (OUTPATIENT)
Dept: INTERNAL MEDICINE CLINIC | Age: 82
End: 2017-09-28

## 2017-09-28 NOTE — TELEPHONE ENCOUNTER
[phone] pt's wife requesting a call back regarding x-ray results from 6/7/17, she says that she requested a copy of those results and she only received one sheet of seven. She says that it was mailed to her.

## 2017-10-04 ENCOUNTER — TELEPHONE (OUTPATIENT)
Dept: INTERNAL MEDICINE CLINIC | Age: 82
End: 2017-10-04

## 2017-10-04 DIAGNOSIS — I10 ESSENTIAL HYPERTENSION WITH GOAL BLOOD PRESSURE LESS THAN 140/90: ICD-10-CM

## 2017-10-05 RX ORDER — AMLODIPINE BESYLATE 10 MG/1
TABLET ORAL
Qty: 90 TAB | Refills: 4 | OUTPATIENT
Start: 2017-10-05

## 2017-10-06 ENCOUNTER — HOSPITAL ENCOUNTER (OUTPATIENT)
Dept: GENERAL RADIOLOGY | Age: 82
Discharge: HOME | End: 2017-10-06
Attending: INTERNAL MEDICINE
Payer: MEDICARE

## 2017-10-06 DIAGNOSIS — Z87.19 H/O HERNIA REPAIR: ICD-10-CM

## 2017-10-06 DIAGNOSIS — Z98.890 H/O HERNIA REPAIR: ICD-10-CM

## 2017-10-06 DIAGNOSIS — R13.11 DYSPHAGIA, ORAL PHASE: ICD-10-CM

## 2017-10-06 PROCEDURE — G8998 SWALLOW D/C STATUS: HCPCS | Performed by: PHYSICAL THERAPIST

## 2017-10-06 PROCEDURE — 92611 MOTION FLUOROSCOPY/SWALLOW: CPT

## 2017-10-06 PROCEDURE — G8997 SWALLOW GOAL STATUS: HCPCS | Performed by: PHYSICAL THERAPIST

## 2017-10-06 PROCEDURE — G8996 SWALLOW CURRENT STATUS: HCPCS | Performed by: PHYSICAL THERAPIST

## 2017-10-06 PROCEDURE — 74230 X-RAY XM SWLNG FUNCJ C+: CPT

## 2017-10-06 NOTE — PROGRESS NOTES
[facility]  [address], [address]    Speech Pathology Modified barium swallow Study with cms g codes  Patient: Vivian Knox (89 y.o. male)  Date: 10/6/2017  Referring Provider:  Molly Schneider    SUBJECTIVE:   Patient c/o solid and liquid dysphagia \"It won't go down\". He had an OP MBS at [facility] on 8-4-17 b/c he aspirated on barium esophagram that showed moderate reflux and hiatal hernia. On MBS, he had mild delay at valleculae, decreased BOT retraction, decreased epiglottal inversion, decreased larygneal elevation, trace penetration with thins with eventual silent aspiration. HE also had mild-moderate valleculae and pyriform residue. Nectar thick liquilds were recommended and OP SLP. He has been working with OP SLP on swallowing exercises. He has been mostly noncompliant with thick liquids. OBJECTIVE:   Past Medical History:   Past Medical History:   Diagnosis Date    Asthma     Autoimmune disease ([facility] [address]     CLL    Cancer ([facility] [address] 6/20/14    MULTIPLE SCCAs EXCISED, ALSO BCCAs    CLL (chronic lymphocytic leukemia) (HCC)     no treatment    GERD (gastroesophageal reflux disease)     Hypercholesterolemia     Hypertension     Liver disease 6/6/2014    ADMITTED FOR LIVER DYSFUNCTION, NOT DIAGNOSED, NOW RESOLVED    Pulmonary embolism ([facility] [address] 9/2015    Thyroid disease     Unspecified adverse effect of anesthesia     \"out of it for 2 weeks\"    Unspecified sleep apnea     had sleep study but no follow up     Past Surgical History:   Procedure Laterality Date    HX ABDOMINAL LAPAROSCOPY  4/2016    HX COLONOSCOPY      X2, MOST RECNT WAS IN 2013    HX HEENT      tonsillectomy    HX HERNIA REPAIR  4/13/16    paraesophageal hernia repair Dr Edgard Rao HX ORTHOPAEDIC Left     Frozen shoulder manipulation     Current Dietary Status:  Regular, thins/nectars?   Radiologist: Murphy Pitts  Film Views: Lateral  Patient Position: upright in Hausted chair    Trial 1: Trial 2:   Consistency Presented: Thin liquid; Solid;Puree;Pill/Tablet     How Presented: Self-fed/presented;Spoon;Straw;Cup/sip; Successive swallows (1-2 sips  at a time max)      ORAL PHASE:      Bolus Acceptance:  (he chewed the whole pill. He was able to swallow 1/2 pill with cues to not chew it)     Bolus Formation/Control: Impaired (oral holding with piecemeal deglutiton of liquids. he appeared to have decreased sensation in the oral cavity for the second bolus with oral holding and attempt to talk at time with bolus in mouth, that then triggered swallow): Anterior;Posterior. Oral transit was [facility] with purees. He had decreased ROm and vertical chew with solids that was effective, but reduced efficiency.   :     Propulsion: Tongue pumping     Oral Residue: Lingual (due to piecemeal bolus with liquid)   PHARYNGEAL PHASE:      Initiation of Swallow: Triggered at vallecula (mild)     Timing: Pooling 1-5 sec     Penetration: None     Aspiration/Timing: No evidence of aspiration     Pharyngeal Clearance: Vallecular residue;Pyriform residue ; Less than 10% (this increased as mild residue spilled over from the oral cavity at times. HE had poor awareness for small amounts of pharyngeal residue.)     Attempted Modifications: Double swallow     Effective Modifications: Double swallow     Cues for Modifications:  Moderate-maximal (wife says they have a \"swallow twice\" signup at home, but his complieance is inconsistent)             Trial 3: Trial 4:                            :    :                                                                        Decreased Tongue Base Retraction?: Yes (mild)  Laryngeal Elevation: Reduced excursion with laryngeal vestibule gap  Aspiration/Penetration Score: 1 (No penetration or aspiration-Contrast does not enter the airway)  Pharyngeal Symmetry: Not assessed  Pharyngeal-Esophageal Segment: No impairment               In compliance with CMSs Claims Based Outcome Reporting, the following G-code set was chosen for this patient based the use of the NOMS functional outcome to quantify this patient's level of swallowing impairment. Using the NOMS, the patient was determined to be at level 5 for swallow function which correlates with the CJ= 20-39% level of severity. Based on the objective assessment provided within this note, the current, goal, and discharge g-codes are as follows:    Swallow  Swallowing:   Swallow Current Status CJ= 20-39%   Swallow Goal Status CJ= 20-39%   Swallow D/C Status CJ= 20-39%        NOMS Swallowing Levels:  Level 1 (CN): NPO  Level 2 (CM): NPO but takes consistency in therapy  Level 3 (CL): Takes less than 50% of nutrition p.o. and continues with nonoral feedings; and/or safe with mod cues; and/or max diet restriction  Level 4 (CK): Safe swallow but needs mod cues; and/or mod diet restriction; and/or still requires some nonoral feeding/supplements  Level 5 (CJ): Safe swallow with min diet restriction; and/or needs min cues  Level 6 (CI): Independent with p.o.; rare cues; usually self cues; may need to avoid some foods or needs extra time  Level 7 ([address] [place] [address]): Independent for all p.o.  LUIS FERNANDO. (2003). National Outcomes Measurement System (NOMS): Adult Speech-Language Pathology User's Guide. ASSESSMENT :  Based on the objective data described above, the patient presents with mild-moderate oral and mild pharyngeal dysphagia. His swallow has improved, compared to [facility] [place]. HE still has motor coordination and sensory issues in oral and pharyngeal cavity, but has greatly improved his pharyngeal strength to clear bolus from pharynx. PLAN/RECOMMENDATIONS :  Ok for regular diet, thin liquids. Recommend f/u with OP SLp to reinforce diet safety and continuance of swallow exercises for at least 6 months to maintain strength progress. Pills may still be problematic, as he chewed large on in MBS automatically. COMMUNICATION/EDUCATION:   The above findings and recommendations were discussed with: patient and wife who verbalized understanding.     Thank you for this referral.Elvia Lopez, SLP  Time Calculation: 20 mins

## 2017-11-10 ENCOUNTER — OFFICE VISIT (OUTPATIENT)
Dept: INTERNAL MEDICINE CLINIC | Age: 82
End: 2017-11-10

## 2017-11-10 ENCOUNTER — HOSPITAL ENCOUNTER (OUTPATIENT)
Dept: GENERAL RADIOLOGY | Age: 82
Discharge: HOME | End: 2017-11-10
Payer: MEDICARE

## 2017-11-10 VITALS
BODY MASS INDEX: 24.45 KG/M2 | HEIGHT: 70 IN | DIASTOLIC BLOOD PRESSURE: 71 MMHG | OXYGEN SATURATION: 96 % | TEMPERATURE: 97.7 F | SYSTOLIC BLOOD PRESSURE: 119 MMHG | HEART RATE: 82 BPM | RESPIRATION RATE: 16 BRPM | WEIGHT: 170.8 LBS

## 2017-11-10 DIAGNOSIS — J40 BRONCHITIS: ICD-10-CM

## 2017-11-10 DIAGNOSIS — C91.10 CLL (CHRONIC LYMPHOCYTIC LEUKEMIA) (HCC): ICD-10-CM

## 2017-11-10 DIAGNOSIS — R05.9 COUGH: Primary | ICD-10-CM

## 2017-11-10 DIAGNOSIS — I10 ESSENTIAL HYPERTENSION: ICD-10-CM

## 2017-11-10 DIAGNOSIS — R05.9 COUGH: ICD-10-CM

## 2017-11-10 PROCEDURE — 71020 XR CHEST PA LAT: CPT

## 2017-11-10 RX ORDER — LEVOFLOXACIN 750 MG/1
TABLET ORAL
Qty: 7 TAB | Refills: 0 | Status: SHIPPED | OUTPATIENT
Start: 2017-11-10 | End: 2017-12-26

## 2017-11-10 NOTE — MR AVS SNAPSHOT
Visit Information Date & Time Provider Department Dept. Phone Encounter #  
 11/10/2017  1:15 PM Carlos Eduardo Fam, [address] [facility] [phone] [id number] Upcoming Health Maintenance Date Due ZOSTER VACCINE AGE 60> 1[birth year]17/1991 Influenza Age 5 to Adult 8/1/2017 GLAUCOMA SCREENING Q2Y 12/16/2017 MEDICARE YEARLY EXAM 12/22/2017 DTaP/Tdap/Td series (2 - Td) 6/14/2026 Allergies as of 11/10/2017  Review Complete On: 11/10/2017 By: Roberto Gallardo LPN Severity Noted Reaction Type Reactions Other Food  06/20/2014    Other (comments) AVOIDS OTHER SPICES IN GENERAL DUE SEVERE REACTIONS TO OREGANO AND MUSTARD Mustard High 06/20/2014    Anaphylaxis THROAT CLOSES Oregano High 06/20/2014    Anaphylaxis THROAT CLOSES Zithromax [Azithromycin] High 03/30/2011    Anaphylaxis Pcn [Penicillins]  03/30/2011    Unable to Obtain Current Immunizations  Reviewed on 8/25/2016 Name Date Influenza High Dose Vaccine PF 10/26/2016 Influenza Vaccine 10/15/2015, 10/22/2014, 10/1/2013 Pneumococcal Conjugate (PCV-13) 5/18/2015 Pneumococcal Vaccine (Unspecified Type) 10/1/2011 Tdap 6/14/2016 Not reviewed this visit You Were Diagnosed With   
  
 Codes Comments Cough    -  Primary ICD-10-CM: R25 ICD-9-CM: 099. 2 Vitals BP Pulse Temp Resp Height(growth percentile) Weight(growth percentile) 119/71 (BP 1 Location: Right arm, BP Patient Position: Sitting) 82 97.7 °F (36.5 °C) (Oral) 16 5' 10\" (1.778 m) 170 lb 12.8 oz (77.5 kg) SpO2 BMI Smoking Status 96% 24.51 kg/m2 Never Smoker Vitals History BMI and BSA Data Body Mass Index Body Surface Area 24.51 kg/m 2 1.96 m 2 Preferred Pharmacy Pharmacy Name Phone [facility] [phone] [facility] [phone] Your Updated Medication List  
  
   
 This list is accurate as of: 11/10/17  1:50 PM.  Always use your most recent med list.  
  
  
  
  
 albuterol 90 mcg/actuation inhaler Commonly known as:  PROVENTIL HFA, VENTOLIN HFA, PROAIR HFA Take 2 Puffs by inhalation every six (6) hours as needed for Wheezing. amLODIPine 10 mg tablet Commonly known as:  Karla Tricia TAKE ONE TABLET BY MOUTH ONCE DAILY  
  
 aspirin delayed-release 81 mg tablet Take  by mouth daily. bismuth subsalicylate 118 MO/58 mL Susp Commonly known as:  PEPTO-BISMOL MAXIMUM STRENGTH Take 30 mL by mouth every six (6) hours as needed. calcium carbonate 200 mg calcium (500 mg) Chew Commonly known as:  TUMS Take 1 Tab by mouth as needed (heartburn). co-enzyme Q-10 100 mg capsule Commonly known as:  CO Q-10 Take 100 mg by mouth daily. colestipol 1 gram tablet Commonly known as:  COLESTID Take 1 g by mouth daily. doxycycline 100 mg tablet Commonly known as:  ADOXA Take 1 Tab by mouth two (2) times a day. hydroCHLOROthiazide 25 mg tablet Commonly known as:  HYDRODIURIL  
TAKE ONE TABLET BY MOUTH ONCE DAILY  
  
 levoFLOXacin 750 mg tablet Commonly known as:  LEVAQUIN  
1 tab daily  
  
 levothyroxine 150 mcg tablet Commonly known as:  SYNTHROID Take 1 Tab by mouth Daily (before breakfast). Dose adjustment LOMOTIL 2.5-0.025 mg per tablet Generic drug:  diphenoxylate-atropine Take  by mouth four (4) times daily as needed for Diarrhea. MEN'S MULTI-VITAMIN PO Take 2 each by mouth daily. mirtazapine 7.5 mg tablet Commonly known as:  Cantrell Sitter Take 1 Tab by mouth nightly. OTHER RepairVite-SE 1 scoop daily for bowel support. pravastatin 20 mg tablet Commonly known as:  PRAVACHOL  
TAKE ONE TABLET BY MOUTH NIGHTLY PROBIOTIC 3 billion cell Cap Generic drug:  lactobacillus combination no.4 Take  by mouth. Ultimate Melanie Probiotic 30 billion cx per cap - 1 pill per day. VITAMIN B-12 1,000 mcg sublingual tablet Generic drug:  cyanocobalamin Take 1,000 mcg by mouth daily. VITAMIN D3 2,000 unit Tab Generic drug:  cholecalciferol (vitamin D3) Take 1 tablet by mouth daily. ZANTAC 75 75 mg tablet Generic drug:  raNITIdine Take 150 mg by mouth nightly. Prescriptions Sent to Pharmacy Refills  
 levoFLOXacin (LEVAQUIN) 750 mg tablet 0 Si tab daily Class: Normal  
 Pharmacy: [id number] [place] [address] [facility] #: [phone] To-Do List   
 11/10/2017 Imaging:  XR CHEST PA LAT Patient Instructions Levaquin 1 tablet daily x 7 days Mucinex 2 tabs daily Increase fluids Call or return to clinic if these symptoms worsen or fail to improve as anticipated. Introducing [facility]! Dear iVni Espitia: Thank you for requesting a [employer] account. Our records indicate that you already have an active [employer] account. You can access your account anytime at [email or web address] Did you know that you can access your hospital and ER discharge instructions at any time in [employer]? You can also review all of your test results from your hospital stay or ER visit. Additional Information If you have questions, please visit the Frequently Asked Questions section of the [employer] website at [email or web address] Remember, [employer] is NOT to be used for urgent needs. For medical emergencies, dial 911. Now available from your iPhone and Android! Please provide this summary of care documentation to your next provider. Your primary care clinician is listed as [facility] [phone] If you have any questions after today's visit, please call [phone].

## 2017-11-10 NOTE — PROGRESS NOTES
HISTORY OF PRESENT ILLNESS  Rasta Aguero is a 80 y.o. male. HPI Patient has a history of CLL, asthma, and chronic aspiration, recently released from [address] [place] [address], but continues his swallowing exercises, and sleeps propped up. He complains of severe coughing x 2 days. Cough was much worse last night, causing him to vomit. He returned from a trip to [facility] 4 days ago. He denies known exposures. He denies rhinitis, facial pain or sore throat. He also denies fevers or sputum production, shortness of breath, or wheezing but has been using his Albuterol inhaler. Past Medical History:   Diagnosis Date    Asthma     Autoimmune disease ([facility]     CLL    Cancer ([facility] 6/20/14    MULTIPLE SCCAs EXCISED, ALSO BCCAs    CLL (chronic lymphocytic leukemia) (HCC)     no treatment    GERD (gastroesophageal reflux disease)     Hypercholesterolemia     Hypertension     Liver disease 6/6/2014    ADMITTED FOR LIVER DYSFUNCTION, NOT DIAGNOSED, NOW RESOLVED    Pulmonary embolism ([facility] 9/2015    Thyroid disease     Unspecified adverse effect of anesthesia     \"out of it for 2 weeks\"    Unspecified sleep apnea     had sleep study but no follow up      Current Outpatient Prescriptions on File Prior to Visit   Medication Sig Dispense Refill    amLODIPine (NORVASC) 10 mg tablet TAKE ONE TABLET BY MOUTH ONCE DAILY 90 Tab 4    bismuth subsalicylate (PEPTO-BISMOL MAXIMUM STRENGTH) 525 mg/15 mL susp Take 30 mL by mouth every six (6) hours as needed.  hydroCHLOROthiazide (HYDRODIURIL) 25 mg tablet TAKE ONE TABLET BY MOUTH ONCE DAILY 90 Tab 0    cyanocobalamin (VITAMIN B-12) 1,000 mcg sublingual tablet Take 1,000 mcg by mouth daily.  pravastatin (PRAVACHOL) 20 mg tablet TAKE ONE TABLET BY MOUTH NIGHTLY 90 Tab 3    albuterol (PROVENTIL HFA, VENTOLIN HFA, PROAIR HFA) 90 mcg/actuation inhaler Take 2 Puffs by inhalation every six (6) hours as needed for Wheezing.  1 Inhaler 0    mirtazapine (REMERON) 7.5 mg tablet Take 1 Tab by mouth nightly. 30 Tab 1    levothyroxine (SYNTHROID) 150 mcg tablet Take 1 Tab by mouth Daily (before breakfast). Dose adjustment 90 Tab 1    lactobacillus combination no.4 (PROBIOTIC) 3 billion cell cap Take  by mouth. Ultimate Melanie Probiotic 30 billion cx per cap - 1 pill per day.  diphenoxylate-atropine (LOMOTIL) 2.5-0.025 mg per tablet Take  by mouth four (4) times daily as needed for Diarrhea.  raNITIdine (ZANTAC 75) 75 mg tablet Take 150 mg by mouth nightly.  aspirin delayed-release 81 mg tablet Take  by mouth daily.  co-enzyme Q-10 (CO Q-10) 100 mg capsule Take 100 mg by mouth daily.  calcium carbonate (TUMS) 200 mg calcium (500 mg) chew Take 1 Tab by mouth as needed (heartburn).  MEN'S MULTI-VITAMIN PO Take 2 each by mouth daily.  cholecalciferol, vitamin D3, (VITAMIN D3) 2,000 unit tab Take 1 tablet by mouth daily.  doxycycline (ADOXA) 100 mg tablet Take 1 Tab by mouth two (2) times a day. 20 Tab 0    colestipol (COLESTID) 1 gram tablet Take 1 g by mouth daily.  OTHER RepairVite-SE 1 scoop daily for bowel support. No current facility-administered medications on file prior to visit. Allergies   Allergen Reactions    Other Food Other (comments)     AVOIDS OTHER SPICES IN GENERAL DUE SEVERE REACTIONS TO OREGANO AND MUSTARD    Mustard Anaphylaxis     THROAT CLOSES    Oregano Anaphylaxis     THROAT CLOSES    Zithromax [Azithromycin] Anaphylaxis    Pcn [Penicillins] Unable to Obtain        ROS  Per HPI  Physical Exam  Visit Vitals    /71 (BP 1 Location: Right arm, BP Patient Position: Sitting)    Pulse 82    Temp 97.7 °F (36.5 °C) (Oral)    Resp 16    Ht 5' 10\" (1.778 m)    Wt 170 lb 12.8 oz (77.5 kg)    SpO2 96%    BMI 24.51 kg/m2     Patient has violent coughing episodes during appointment   HEENT: normocephalic, conjunctiva clear  Oropharynx: clear.  No erythema, exudate, or drainage  Nose: no drainage  Sinuses: nontender  Ears: tympanic membrane's and canals normal.  No erythema, fluid, drainage  Neck: no adenopathy  Heart[de-identified] normal rate, regular rhythm, normal S1, S2, no murmurs, rubs, clicks or gallops. Chest: good air movement with bilateral rhonchi throughout and  and few rales left base  Extremities: no edema    ASSESSMENT and PLAN  Bronchitis   R/O Aspiration Pneumonia  Chest X ray  Levaquin 750  Mg every day  Continue Albuterol MDI  Mucinex bid  Call or return to clinic if these symptoms worsen or fail to improve as anticipated. Follow-up Disposition:  Return if symptoms worsen or fail to improve. Advised to call back or return to office if symptoms worsen/change/persist.  Discussed expected course/resolution/complications of diagnosis in detail with patient. Medication risks/benefits/costs/interactions/alternatives discussed with patient. He was given an after visit summary which includes diagnoses, current medications, & vitals. He expressed understanding with the diagnosis and plan.

## 2017-11-10 NOTE — PATIENT INSTRUCTIONS
Levaquin 1 tablet daily x 7 days  Mucinex 2 tabs daily  Increase fluids  Call or return to clinic if these symptoms worsen or fail to improve as anticipated.

## 2017-11-10 NOTE — PROGRESS NOTES
Chief Complaint   Patient presents with    Cough     1. Have you been to the ER, urgent care clinic since your last visit? Hospitalized since your last visit? No    2. Have you seen or consulted any other health care providers outside of the [address] [place] [address] since your last visit? Include any pap smears or colon screening. Yes When: 11/09/2017 Where: Speech Therapist Reason for visit: Aspiration    Sore throat and deep cough.  Brown mucus

## 2017-11-13 ENCOUNTER — TELEPHONE (OUTPATIENT)
Dept: INTERNAL MEDICINE CLINIC | Age: 82
End: 2017-11-13

## 2017-11-14 NOTE — TELEPHONE ENCOUNTER
Pt's wife Tere Ear notified chest xray did not show aspiration. Pt. Is slowly improving, is taking antibiotic, mucinex, zicam. Pt. Still coughs at night but is much better during the day. Pt. Plans on going out of town for thanksgiving, offered an appt. To recheck his lungs prior and they will let us know if cough persists or if he worsens but for now feel his has improved.

## 2017-11-14 NOTE — TELEPHONE ENCOUNTER
Pt's wife is calling to get results of X-ray done last Friday after seeing Dr Noel Gutierrez-  What are results -  What does the Pt need to do - advise - [phone]

## 2017-11-17 ENCOUNTER — TELEPHONE (OUTPATIENT)
Dept: INTERNAL MEDICINE CLINIC | Age: 82
End: 2017-11-17

## 2017-11-17 RX ORDER — MIRTAZAPINE 15 MG/1
15 TABLET, FILM COATED ORAL
Qty: 30 TAB | Refills: 3 | Status: SHIPPED | OUTPATIENT
Start: 2017-11-17 | End: 2018-06-08 | Stop reason: ALTCHOICE

## 2017-11-17 NOTE — TELEPHONE ENCOUNTER
[phone] pt's wife says that she forgot to make her 's cpe for dec and wants to work him in, his last cpe was 12/21/17. Also she says that he is having a problem sleeping.

## 2017-11-20 NOTE — TELEPHONE ENCOUNTER
Spoke with pt spouse who is on hippa. 2 pt identifiers. She states that pt has not been going to sleep until ~3 am. This has been going on for weeks. Per vo SRJ, will try Remeron 15 mg to see if this helps. She will contact me with update. Orders Placed This Encounter    mirtazapine (REMERON) 15 mg tablet     Sig: Take 1 Tab by mouth nightly.      Dispense:  30 Tab     Refill:  3

## 2017-12-26 ENCOUNTER — OFFICE VISIT (OUTPATIENT)
Dept: INTERNAL MEDICINE CLINIC | Age: 82
End: 2017-12-26

## 2017-12-26 VITALS
RESPIRATION RATE: 18 BRPM | DIASTOLIC BLOOD PRESSURE: 72 MMHG | SYSTOLIC BLOOD PRESSURE: 106 MMHG | WEIGHT: 172 LBS | TEMPERATURE: 97.3 F | HEIGHT: 70 IN | BODY MASS INDEX: 24.62 KG/M2 | OXYGEN SATURATION: 98 % | HEART RATE: 78 BPM

## 2017-12-26 DIAGNOSIS — I49.9 IRREGULAR HEART BEAT: ICD-10-CM

## 2017-12-26 DIAGNOSIS — I48.91 ATRIAL FIBRILLATION, UNSPECIFIED TYPE (HCC): ICD-10-CM

## 2017-12-26 DIAGNOSIS — L98.9 SKIN LESION: Primary | ICD-10-CM

## 2017-12-26 RX ORDER — ASPIRIN 325 MG
325 TABLET, DELAYED RELEASE (ENTERIC COATED) ORAL DAILY
COMMUNITY
Start: 2017-12-26 | End: 2019-01-11 | Stop reason: DRUGHIGH

## 2017-12-26 RX ORDER — NYSTATIN 100000 [USP'U]/G
POWDER TOPICAL
Qty: 30 G | Refills: 0 | Status: SHIPPED | OUTPATIENT
Start: 2017-12-26 | End: 2018-01-08 | Stop reason: SDUPTHER

## 2017-12-26 NOTE — PROGRESS NOTES
Joaquin Lloyd is a 80 y.o. male who was seen in clinic today (12/26/2017). Assessment & Plan:   Diagnoses and all orders for this visit:    1. Skin lesion- this is a new problem, symptoms are: not changed, differential dx reviewed with the patient, and is not entirely clear. Could be inflammation from tinea infection. Do not looks like cellulitis, abscess, or LN. If no changes will make appt with his dermatologist.  Will start on meds below. Red flags were reviewed with the patient to RTC or notify me, expected time course for resolution reviewed. -     nystatin (MYCOSTATIN) powder; Apply to the left groin 4 times a day    2. Atrial fibrillation, unspecified type ([address]- new dx, asymptomatic, unclear duration, CHADS2 score is 2, and reviewed tx options. His son is a cardiologist so they would like to d/w him before starting any medications. He will increase ASA 81mg to 325mg. Will have him see cardiologist.  Will notify PCP in case he wants to start NOAC. Emmy Maguire [facility]    3. Irregular heart beat  -     AMB POC EKG ROUTINE W/ 12 LEADS, INTER & REP         Follow-up Disposition:  Return if symptoms worsen or fail to improve. Subjective:   Mike was seen today for Groin Swelling    Dermatology Review  He is here to talk about mass in the groin. He noticed it 5 days ago, with unchanged since that time. Location: left groin. Symptoms include erythema and swollen. He denies: fevers or chills, recent travel, or trauma. Treatment to date has included none. Prior to Admission medications    Medication Sig Start Date End Date Taking? Authorizing Provider   mirtazapine (REMERON) 15 mg tablet Take 1 Tab by mouth nightly.  11/17/17  Yes Malika Duff III, MD   amLODIPine (NORVASC) 10 mg tablet TAKE ONE TABLET BY MOUTH ONCE DAILY 9/18/17  Yes Jadyn Norris MD   bismuth subsalicylate (PEPTO-BISMOL MAXIMUM STRENGTH) 525 mg/15 mL susp Take 30 mL by mouth every six (6) hours as needed. Yes Historical Provider   hydroCHLOROthiazide (HYDRODIURIL) 25 mg tablet TAKE ONE TABLET BY MOUTH ONCE DAILY 7/24/17  Yes Cahya Rodriguez III, MD   cyanocobalamin (VITAMIN B-12) 1,000 mcg sublingual tablet Take 1,000 mcg by mouth daily. Yes Historical Provider   pravastatin (PRAVACHOL) 20 mg tablet TAKE ONE TABLET BY MOUTH NIGHTLY 6/2/17  Yes Chaya Rodriguez III, MD   albuterol (PROVENTIL HFA, VENTOLIN HFA, PROAIR HFA) 90 mcg/actuation inhaler Take 2 Puffs by inhalation every six (6) hours as needed for Wheezing. 6/2/17  Yes Chaya Rodriguez III, MD   levothyroxine (SYNTHROID) 150 mcg tablet Take 1 Tab by mouth Daily (before breakfast). Dose adjustment 1/19/17  Yes Juany Alejo MD   colestipol (COLESTID) 1 gram tablet Take 1 g by mouth daily. 11/11/16  Yes Historical Provider   OTHER RepairVite-SE 1 scoop daily for bowel support. Yes Historical Provider   lactobacillus combination no.4 (PROBIOTIC) 3 billion cell cap Take  by mouth. Ultimate Melanie Probiotic 30 billion cx per cap - 1 pill per day. Yes Historical Provider   diphenoxylate-atropine (LOMOTIL) 2.5-0.025 mg per tablet Take  by mouth four (4) times daily as needed for Diarrhea. Yes Historical Provider   raNITIdine (ZANTAC 75) 75 mg tablet Take 150 mg by mouth nightly. Yes Historical Provider   aspirin delayed-release 81 mg tablet Take  by mouth daily. Yes Historical Provider   co-enzyme Q-10 (CO Q-10) 100 mg capsule Take 100 mg by mouth daily. Yes Historical Provider   calcium carbonate (TUMS) 200 mg calcium (500 mg) chew Take 1 Tab by mouth as needed (heartburn). Yes Historical Provider   MEN'S MULTI-VITAMIN PO Take 2 each by mouth daily. Yes Historical Provider   cholecalciferol, vitamin D3, (VITAMIN D3) 2,000 unit tab Take 1 tablet by mouth daily.    Yes Historical Provider          Allergies   Allergen Reactions    Other Food Other (comments)     AVOIDS OTHER SPICES IN GENERAL DUE SEVERE REACTIONS TO OREGANO AND MUSTARD    Mustard Anaphylaxis     THROAT CLOSES    Oregano Anaphylaxis     THROAT CLOSES    Zithromax [Azithromycin] Anaphylaxis    Pcn [Penicillins] Unable to Obtain           Review of Systems   Constitutional: Negative for chills, fever, malaise/fatigue and weight loss. Respiratory: Negative for cough and shortness of breath. Cardiovascular: Negative for chest pain, palpitations and leg swelling. Gastrointestinal: Negative for abdominal pain, constipation, diarrhea, heartburn, nausea and vomiting. Genitourinary: Negative for dysuria, frequency and urgency. Musculoskeletal: Negative for back pain and neck pain. Neurological: Negative for dizziness and headaches. Psychiatric/Behavioral: Negative for depression. The patient is not nervous/anxious and does not have insomnia. Objective:   Physical Exam   Constitutional: No distress. Cardiovascular: Normal heart sounds. An irregularly irregular rhythm present. No murmur heard. Pulmonary/Chest: Breath sounds normal. He has no wheezes. He has no rales. Skin:   Left groin there is a raised erythematous, edematous skin lesion (4x4cm), not painful, not warm. There are some satellite lesions (flat, smaller < 1cm) extending up the groin along the crease. 2 palpable LN in the groin, NT, not mobile         Visit Vitals    /72    Pulse 78    Temp 97.3 °F (36.3 °C) (Oral)    Resp 18    Ht 5' 10\" (1.778 m)    Wt 172 lb (78 kg)    SpO2 98%    BMI 24.68 kg/m2         Disclaimer:  Advised him to call back or return to office if symptoms worsen/change/persist.  Discussed expected course/resolution/complications of diagnosis in detail with patient. Medication risks/benefits/costs/interactions/alternatives discussed with patient. He was given an after visit summary which includes diagnoses, current medications, & vitals. He expressed understanding with the diagnosis and plan.       Aspects of this note may have been generated using voice recognition software. Despite editing, there may be some syntax errors.        Jazmine Meeks MD

## 2017-12-27 ENCOUNTER — TELEPHONE (OUTPATIENT)
Dept: INTERNAL MEDICINE CLINIC | Age: 82
End: 2017-12-27

## 2017-12-27 NOTE — TELEPHONE ENCOUNTER
Dr. Jocelynn mSart (son) [phone]     Patient's son would like to speak with Dr. Aliyah Mcdowell concerning his father's new diagnosis of Afib.

## 2018-01-03 ENCOUNTER — TELEPHONE (OUTPATIENT)
Dept: INTERNAL MEDICINE CLINIC | Age: 83
End: 2018-01-03

## 2018-01-03 DIAGNOSIS — E78.00 HYPERCHOLESTEROLEMIA: ICD-10-CM

## 2018-01-03 DIAGNOSIS — I10 ESSENTIAL HYPERTENSION WITH GOAL BLOOD PRESSURE LESS THAN 140/90: ICD-10-CM

## 2018-01-03 RX ORDER — HYDROCHLOROTHIAZIDE 25 MG/1
25 TABLET ORAL DAILY
Qty: 90 TAB | Refills: 1 | Status: SHIPPED | OUTPATIENT
Start: 2018-01-03 | End: 2018-10-22 | Stop reason: SDUPTHER

## 2018-01-03 RX ORDER — PRAVASTATIN SODIUM 20 MG/1
20 TABLET ORAL
Qty: 90 TAB | Refills: 1 | Status: SHIPPED | OUTPATIENT
Start: 2018-01-03 | End: 2018-11-09 | Stop reason: SDUPTHER

## 2018-01-03 NOTE — TELEPHONE ENCOUNTER
MICHAEL    The patient was told to call about the bump on his left side of leg.  They went to see   and he took a biopsy today

## 2018-01-03 NOTE — TELEPHONE ENCOUNTER
Orders Placed This Encounter    hydroCHLOROthiazide (HYDRODIURIL) 25 mg tablet     Sig: Take 1 Tab by mouth daily. Dispense:  90 Tab     Refill:  1    pravastatin (PRAVACHOL) 20 mg tablet     Sig: Take 1 Tab by mouth nightly.      Dispense:  90 Tab     Refill:  1

## 2018-01-03 NOTE — TELEPHONE ENCOUNTER
Did not tell him to call back. I saw him for a L groin skin lesion, was not sure what it was but had some concerning features. I guess he saw derm.

## 2018-01-08 DIAGNOSIS — L98.9 SKIN LESION: ICD-10-CM

## 2018-01-08 RX ORDER — NYSTATIN 100000 [USP'U]/G
POWDER TOPICAL
Qty: 30 G | Refills: 2 | Status: SHIPPED | OUTPATIENT
Start: 2018-01-08 | End: 2018-06-08 | Stop reason: ALTCHOICE

## 2018-01-08 NOTE — TELEPHONE ENCOUNTER
Orders Placed This Encounter    nystatin (MYCOSTATIN) powder     Sig: Apply to the left groin 4 times a day     Dispense:  30 g     Refill:  2

## 2018-01-11 ENCOUNTER — OFFICE VISIT (OUTPATIENT)
Dept: CARDIOLOGY CLINIC | Age: 83
End: 2018-01-11

## 2018-01-11 VITALS
OXYGEN SATURATION: 96 % | BODY MASS INDEX: 24.02 KG/M2 | RESPIRATION RATE: 16 BRPM | WEIGHT: 167.8 LBS | HEART RATE: 88 BPM | DIASTOLIC BLOOD PRESSURE: 76 MMHG | SYSTOLIC BLOOD PRESSURE: 124 MMHG | HEIGHT: 70 IN

## 2018-01-11 DIAGNOSIS — R94.31 ABNORMAL ECG: Primary | ICD-10-CM

## 2018-01-11 DIAGNOSIS — I47.1 MULTIFOCAL ATRIAL TACHYCARDIA (HCC): ICD-10-CM

## 2018-01-11 DIAGNOSIS — I10 BENIGN ESSENTIAL HTN: ICD-10-CM

## 2018-01-11 DIAGNOSIS — Z95.828 S/P IVC FILTER: ICD-10-CM

## 2018-01-11 DIAGNOSIS — Z86.711 HISTORY OF PULMONARY EMBOLISM: ICD-10-CM

## 2018-01-11 NOTE — MR AVS SNAPSHOT
Visit Information Date & Time Provider Department Dept. Phone Encounter #  
 1/11/2018  9:40 AM Albania Lopez MD [facility] [place]ma [phone] [id number] Your Appointments 5/9/2018  4:00 PM  
PHYSICAL with Houston Thomason MD  
[address] [facility]) Appt Note: cpe  
 [address] [facility] [address]  
[address] [address] [facility] Upcoming Health Maintenance Date Due ZOSTER VACCINE AGE 60> 1/17/1991 GLAUCOMA SCREENING Q2Y 12/16/2017 MEDICARE YEARLY EXAM 12/22/2017 DTaP/Tdap/Td series (2 - Td) 6/14/2026 Allergies as of 1/11/2018  Review Complete On: 1/11/2018 By: Albania Lopez MD  
  
 Severity Noted Reaction Type Reactions Other Food  06/20/2014    Other (comments) AVOIDS OTHER SPICES IN GENERAL DUE SEVERE REACTIONS TO OREGANO AND MUSTARD Mustard High 06/20/2014    Anaphylaxis THROAT CLOSES Oregano High 06/20/2014    Anaphylaxis THROAT CLOSES Zithromax [Azithromycin] High 03/30/2011    Anaphylaxis Pcn [Penicillins]  03/30/2011    Unable to Obtain Current Immunizations  Reviewed on 12/26/2017 Name Date Influenza High Dose Vaccine PF 10/16/2017, 10/26/2016 Influenza Vaccine 10/15/2015, 10/22/2014, 10/1/2013 Pneumococcal Conjugate (PCV-13) 5/18/2015 Pneumococcal Vaccine (Unspecified Type) 10/1/2011 Tdap 6/14/2016 Not reviewed this visit You Were Diagnosed With   
  
 Codes Comments Abnormal ECG    -  Primary ICD-10-CM: R94.31 
ICD-9-CM: 794.31   
 Multifocal atrial tachycardia (HCC)     ICD-10-CM: I47.1 ICD-9-CM: 427.89 History of pulmonary embolism     ICD-10-CM: Z86.711 ICD-9-CM: V12.55 S/P IVC filter     ICD-10-CM: L34.377 ICD-9-CM: V45.89 Benign essential HTN     ICD-10-CM: I10 
ICD-9-CM: 401.1 Vitals BP Pulse Resp Height(growth percentile) Weight(growth percentile) SpO2 124/76 (BP 1 Location: Left arm) 88 16 5' 10\" (1.778 m) 167 lb 12.8 oz (76.1 kg) 96% BMI Smoking Status 24.08 kg/m2 Never Smoker Vitals History BMI and BSA Data Body Mass Index Body Surface Area 24.08 kg/m 2 1.94 m 2 Preferred Pharmacy Pharmacy Name Phone [address] [place] [address] Rd. [phone] Your Updated Medication List  
  
   
This list is accurate as of: 1/11/18 10:18 AM.  Always use your most recent med list.  
  
  
  
  
 albuterol 90 mcg/actuation inhaler Commonly known as:  PROVENTIL HFA, VENTOLIN HFA, PROAIR HFA Take 2 Puffs by inhalation every six (6) hours as needed for Wheezing. amLODIPine 10 mg tablet Commonly known as:  Larayne Blanco TAKE ONE TABLET BY MOUTH ONCE DAILY  
  
 aspirin delayed-release 325 mg tablet Take 1 Tab by mouth daily. bismuth subsalicylate 364 VU/16 mL Susp Commonly known as:  PEPTO-BISMOL MAXIMUM STRENGTH Take 30 mL by mouth every six (6) hours as needed. calcium carbonate 200 mg calcium (500 mg) Chew Commonly known as:  TUMS Take 1 Tab by mouth as needed (heartburn). co-enzyme Q-10 100 mg capsule Commonly known as:  CO Q-10 Take 100 mg by mouth daily. colestipol 1 gram tablet Commonly known as:  COLESTID Take 1 g by mouth daily. hydroCHLOROthiazide 25 mg tablet Commonly known as:  HYDRODIURIL Take 1 Tab by mouth daily. levothyroxine 150 mcg tablet Commonly known as:  SYNTHROID Take 1 Tab by mouth Daily (before breakfast). Dose adjustment LOMOTIL 2.5-0.025 mg per tablet Generic drug:  diphenoxylate-atropine Take  by mouth four (4) times daily as needed for Diarrhea. MEN'S MULTI-VITAMIN PO Take 2 each by mouth daily. mirtazapine 15 mg tablet Commonly known as:  Brownell Drown Take 1 Tab by mouth nightly. nystatin powder Commonly known as:  MYCOSTATIN  
 Apply to the left groin 4 times a day OTHER RepairVite-SE 1 scoop daily for bowel support. pravastatin 20 mg tablet Commonly known as:  PRAVACHOL Take 1 Tab by mouth nightly. PROBIOTIC 3 billion cell Cap Generic drug:  lactobacillus combination no.4 Take  by mouth. Ultimate Melanie Probiotic 30 billion cx per cap - 1 pill per day. VITAMIN B-12 1,000 mcg sublingual tablet Generic drug:  cyanocobalamin Take 1,000 mcg by mouth daily. VITAMIN D3 2,000 unit Tab Generic drug:  cholecalciferol (vitamin D3) Take 1 tablet by mouth daily. ZANTAC 75 75 mg tablet Generic drug:  raNITIdine Take 150 mg by mouth nightly. Introducing [facility]! Dear Marilu Hammonds: Thank you for requesting a [facility] account. Our records indicate that you already have an active [facility] account. You can access your account anytime at [email or web address] Did you know that you can access your hospital and ER discharge instructions at any time in [facility]? You can also review all of your test results from your hospital stay or ER visit. Additional Information If you have questions, please visit the Frequently Asked Questions section of the [facility] website at [email or web address] Remember, [facility] is NOT to be used for urgent needs. For medical emergencies, dial 911. Now available from your iPhone and Android! Please provide this summary of care documentation to your next provider. Your primary care clinician is listed as Alee [phone] If you have any questions after today's visit, please call [phone].

## 2018-01-11 NOTE — PROGRESS NOTES
[facility]: Keegan Isidro  [phone]    History of Present Illness:   Mr. Nikolas Christensen is an 79 yo M with a history of CLL followed by Dr. Adilia Beckford, essential hypertension, history of PE reportedly in 2015, status post IVC filter, various skin lesions, referred by Dr. Soumya Parker for cardiac evaluation. He is here due to a recent abnormal EKG. From a symptom standpoint, he denies any palpitations. His breathing has been stable. He denies any chest pain. His wife is here with him that does note he has been more easily tired. Last year, he had issues with aspiration and worked with a speech therapist and he had a barium study that improved. He also has been recently seen by dermatology for a skin lesion on his left groin area and biopsy was taken and workup underway. EKG from 12/26/2017 I reviewed personally and though his heart rate is on the faster side with tachycardia, there does appear to be P waves before most QRSs (some likely buried). The P wave morphology does appear like it is different in various beats, most consistent with multifocal atrial tachycardia. We discussed the results. He is compensated on exam with clear lungs. Assessment and Plan:   1. Abnormal EKG. Question of atrial fibrillation, but it is not clear just based on this EKG and recommend him wearing a 24 hour Holter and an echocardiogram for further evaluation. His son is a cardiologist and also looked at this and told him to hold off any anticoagulation. We did talk about if he did have evidence of atrial fibrillation that an anticoagulant may be indicated. 2. History of PE, status post IVC filter. 3. CLL. Followed by Dr. Adilia Beckford. 4. Essential hypertension. Blood pressure is controlled and no changes made. 5. Mixed hyperlipidemia. Soc hx. Here with wife. Son cardiologist in [address] [place] [address] hx. No early CAD      He  has a past medical history of Asthma; Autoimmune disease ([address]  Cancer ([address] (6/20/14); CLL (chronic lymphocytic leukemia) ([facility] GERD (gastroesophageal reflux disease); Hypercholesterolemia; Hypertension; Liver disease (6/6/2014); Pulmonary embolism ([facility] (9/2015); Thyroid disease; Unspecified adverse effect of anesthesia; and Unspecified sleep apnea. All other systems negative except as above. PE  Vitals:    01/11/18 0944   BP: 124/76   Pulse: 88   Resp: 16   SpO2: 96%   Weight: 167 lb 12.8 oz (76.1 kg)   Height: 5' 10\" (1.778 m)    Body mass index is 24.08 kg/(m^2).    General appearance - alert, well appearing, and in no distress  Mental status - affect appropriate to mood  Eyes - sclera anicteric, moist mucous membranes  Neck - supple, no JVD  Chest - clear to auscultation, no wheezes, rales or rhonchi  Heart - normal rate, regular rhythm, normal S1, S2, I/VI systolic murmur LUSB  Abdomen - soft, nontender, nondistended, no masses or organomegaly  Neurological -no focal deficit  Extremities - peripheral pulses normal, no pedal edema      Recent Labs:  Lab Results   Component Value Date/Time    Cholesterol, total 118 06/09/2017 09:24 AM    HDL Cholesterol 52 06/09/2017 09:24 AM    LDL, calculated 54 06/09/2017 09:24 AM    Triglyceride 61 06/09/2017 09:24 AM     Lab Results   Component Value Date/Time    Creatinine 0.93 06/09/2017 09:24 AM     Lab Results   Component Value Date/Time    BUN 20 06/09/2017 09:24 AM     Lab Results   Component Value Date/Time    Potassium 4.1 06/09/2017 09:24 AM     No results found for: HBA1C, HGBE8, VXQ4COUB  Lab Results   Component Value Date/Time    HGB 12.0 10/14/2016 01:44 PM     Lab Results   Component Value Date/Time    PLATELET 938 02/79/0895 01:44 PM       Reviewed:  Past Medical History:   Diagnosis Date    Asthma     Autoimmune disease ([facility]     CLL    Cancer ([facility] 6/20/14    MULTIPLE SCCAs EXCISED, ALSO BCCAs    CLL (chronic lymphocytic leukemia) (HCC)     no treatment    GERD (gastroesophageal reflux disease)     Hypercholesterolemia     Hypertension     Liver disease 6/6/2014    ADMITTED FOR LIVER DYSFUNCTION, NOT DIAGNOSED, NOW RESOLVED    Pulmonary embolism ([facility] 9/2015    Thyroid disease     Unspecified adverse effect of anesthesia     \"out of it for 2 weeks\"    Unspecified sleep apnea     had sleep study but no follow up     History   Smoking Status    Never Smoker   Smokeless Tobacco    Never Used     History   Alcohol Use    2.5 oz/week    5 Standard drinks or equivalent per week     Comment: occasional     Allergies   Allergen Reactions    Other Food Other (comments)     AVOIDS OTHER SPICES IN GENERAL DUE SEVERE REACTIONS TO OREGANO AND MUSTARD    Mustard Anaphylaxis     THROAT CLOSES    Oregano Anaphylaxis     THROAT CLOSES    Zithromax [Azithromycin] Anaphylaxis    Pcn [Penicillins] Unable to Obtain       Current Outpatient Prescriptions   Medication Sig    nystatin (MYCOSTATIN) powder Apply to the left groin 4 times a day    hydroCHLOROthiazide (HYDRODIURIL) 25 mg tablet Take 1 Tab by mouth daily.  pravastatin (PRAVACHOL) 20 mg tablet Take 1 Tab by mouth nightly.  aspirin delayed-release 325 mg tablet Take 1 Tab by mouth daily.  mirtazapine (REMERON) 15 mg tablet Take 1 Tab by mouth nightly.  amLODIPine (NORVASC) 10 mg tablet TAKE ONE TABLET BY MOUTH ONCE DAILY    bismuth subsalicylate (PEPTO-BISMOL MAXIMUM STRENGTH) 525 mg/15 mL susp Take 30 mL by mouth every six (6) hours as needed.  cyanocobalamin (VITAMIN B-12) 1,000 mcg sublingual tablet Take 1,000 mcg by mouth daily.  albuterol (PROVENTIL HFA, VENTOLIN HFA, PROAIR HFA) 90 mcg/actuation inhaler Take 2 Puffs by inhalation every six (6) hours as needed for Wheezing.  levothyroxine (SYNTHROID) 150 mcg tablet Take 1 Tab by mouth Daily (before breakfast). Dose adjustment    colestipol (COLESTID) 1 gram tablet Take 1 g by mouth daily.  OTHER RepairVite-SE 1 scoop daily for bowel support.     lactobacillus combination no.4 (PROBIOTIC) 3 billion cell cap Take  by mouth. Ultimate Melanie Probiotic 30 billion cx per cap - 1 pill per day.  diphenoxylate-atropine (LOMOTIL) 2.5-0.025 mg per tablet Take  by mouth four (4) times daily as needed for Diarrhea.  raNITIdine (ZANTAC 75) 75 mg tablet Take 150 mg by mouth nightly.  co-enzyme Q-10 (CO Q-10) 100 mg capsule Take 100 mg by mouth daily.  calcium carbonate (TUMS) 200 mg calcium (500 mg) chew Take 1 Tab by mouth as needed (heartburn).  MEN'S MULTI-VITAMIN PO Take 2 each by mouth daily.  cholecalciferol, vitamin D3, (VITAMIN D3) 2,000 unit tab Take 1 tablet by mouth daily. No current facility-administered medications for this visit.         Vaughn Olmstead MD  [facility] [place]  [address] [facility] [address]  [address]

## 2018-01-12 ENCOUNTER — TELEPHONE (OUTPATIENT)
Dept: CARDIOLOGY CLINIC | Age: 83
End: 2018-01-12

## 2018-01-12 NOTE — TELEPHONE ENCOUNTER
Dr. Abdias Puga patients son is a Cardiologist and just wanted to touch base with Dr. Edin Luna at his convenience.  He can be reached at  [phone]

## 2018-01-15 ENCOUNTER — CLINICAL SUPPORT (OUTPATIENT)
Dept: CARDIOLOGY CLINIC | Age: 83
End: 2018-01-15

## 2018-01-15 DIAGNOSIS — I10 BENIGN ESSENTIAL HTN: ICD-10-CM

## 2018-01-15 DIAGNOSIS — Z86.711 HISTORY OF PULMONARY EMBOLISM: ICD-10-CM

## 2018-01-15 DIAGNOSIS — R94.31 ABNORMAL ECG: ICD-10-CM

## 2018-01-15 DIAGNOSIS — Z95.828 S/P IVC FILTER: ICD-10-CM

## 2018-01-15 DIAGNOSIS — I47.1 MULTIFOCAL ATRIAL TACHYCARDIA (HCC): ICD-10-CM

## 2018-01-16 ENCOUNTER — TELEPHONE (OUTPATIENT)
Dept: CARDIOLOGY CLINIC | Age: 83
End: 2018-01-16

## 2018-01-16 NOTE — TELEPHONE ENCOUNTER
Patient's wife called in and needs to when they need to bring the Holter back in. She also would like to know results of 's EKG.   Phone [phone]  SK

## 2018-01-16 NOTE — TELEPHONE ENCOUNTER
----- Message from Soren Paz MD sent at 1/16/2018  4:44 PM EST -----  Please let pt know echo was overall normal. thx        Above test results given to patient.   2 pt identifiers used

## 2018-01-16 NOTE — TELEPHONE ENCOUNTER
Returned spouse call, 2 pt identifiers used    Advised patient had Holter placed yesterday at 4 pm and will run until 4 pm today. They will bring Holter back before 5 today. Echo results are not available yet. Will call once they are.

## 2018-01-18 ENCOUNTER — TELEPHONE (OUTPATIENT)
Dept: CARDIOLOGY CLINIC | Age: 83
End: 2018-01-18

## 2018-01-18 NOTE — TELEPHONE ENCOUNTER
----- Message from Vaughn Olmstead MD sent at 1/18/2018 10:59 AM EST -----  Please let pt know holter did not show afib so no blood thinner needed. There were frequent benign PACs. Could replace norvasc for toprol xl 50 mg once daily. If not symptomatic, do not need to make any change. F/u in 1 month if switched to toprol. thx      Above Holter results given to patient, 2 pt identifiers used  He request a copy of the report be sent to his son in [facility] who is a cardiologist and is on [employer]. Report mailed to home address provided by patient.

## 2018-02-19 RX ORDER — LEVOTHYROXINE SODIUM 150 UG/1
TABLET ORAL
Qty: 85 TAB | Refills: 2 | Status: SHIPPED | OUTPATIENT
Start: 2018-02-19 | End: 2019-05-10 | Stop reason: SDUPTHER

## 2018-02-21 ENCOUNTER — TELEPHONE (OUTPATIENT)
Dept: CARDIOLOGY CLINIC | Age: 83
End: 2018-02-21

## 2018-02-21 NOTE — TELEPHONE ENCOUNTER
Dr. Stephan Dewitt called looking to speak with Dr. Jah Youngblood regarding this patient. Requested to have Dr. Jah Youngblood call him as soon as possible. Dr. Jah Youngblood has been paged and emailed with his contact information.

## 2018-04-06 ENCOUNTER — HOSPITAL ENCOUNTER (OUTPATIENT)
Dept: PHYSICAL THERAPY | Age: 83
Discharge: HOME | End: 2018-04-06
Payer: MEDICARE

## 2018-04-06 PROCEDURE — 97161 PT EVAL LOW COMPLEX 20 MIN: CPT | Performed by: PHYSICAL THERAPIST

## 2018-04-06 PROCEDURE — G8978 MOBILITY CURRENT STATUS: HCPCS | Performed by: PHYSICAL THERAPIST

## 2018-04-06 PROCEDURE — 97110 THERAPEUTIC EXERCISES: CPT | Performed by: PHYSICAL THERAPIST

## 2018-04-06 PROCEDURE — G8979 MOBILITY GOAL STATUS: HCPCS | Performed by: PHYSICAL THERAPIST

## 2018-04-06 NOTE — PROGRESS NOTES
PT INITIAL EVALUATION NOTE 2-15    [facility]  [address], [place], [address]  Phone: [phone]  Fax: [phone]    Patient Name: Giovany Grey  Date:2018  : 3/17/1931  [x]  Patient  Verified  Payor: Yudi Carreon / Plan: [place] MED[place] & B / Product Type: Medicare /     In time: 10:15 AM  Out time: 10:50 AM  Total Treatment Time (min): 35  Total Timed Codes: 10  1:1 Time: 35 min  Visit #: 1     Treatment Area: Unspecified abnormalities of gait and mobility [R26.9]    SUBJECTIVE  Any medication changes, allergies to medications, adverse drug reactions, diagnosis change, or new procedure performed?: [] No    [x] Yes (see summary sheet for update)    Date of onset/injury:   Pt presents with chief compliant of general weakness/balance dysfunction. Pt's wife present for evaluation (majority of information obtained from wife). Pt was diagnosed with non-hodgkins lymphoma in chest the end of 2018; he went through 2 rounds of chemo. At the end of March when they returned for a f/u visit with the oncologist, pt had scans/blood work-- cancer was completely gone. Pt states he felt tired from the chemo but has been trying his best to continue doing his daily activities and walking. Next f/u with Dr. Kenia Byrd on 18  No history of recent falls, he occasionally worries about falling    Aggravated by:  n/a    Eased by: n/a    History of falls?: no    Location of symptoms: gait/balance/generalized weakness    Pain Level (0-10 scale): 0/10 max, min, today. Pt states he does not have any pain. Previous treatment: PT in     Diagnostic Tests: [x] Lab work [] X-rays    [x] CT [] MRI     [] Other:  Results (per report of the patient): cleared from cancer    PMH: Significant for hernia surgery ; recent diagnosis of cancer     Social/Recreation/Work: Retired .  Pt tries to be active around the house, walking up/down stairs; they belong to the [employer], however have not been in a few years    Prior level of function: Improved activity tolerance; LE strength; balance    Patient goal(s): to be stronger, to have better balance    OBJECTIVE:    Posture/Observation: fair sitting/standing posture  Pt ambulates independently with good albin    Strength (1-5)           Right Left   Hip flexion 4 4   Knee ext 4+ 4+   Knee flex 4+ 4+   Ankle DF 5 5        SLR At least 3/5 At least 3/5   Hip abd 4 4       LE Flexibility:   Tight HS bilat, hip adductors bilat    SLS on R: 2 sec. Max postural sway  SLS on L: 1-2 sec. Max postural sway    Tandem stance:  R in front: >10 sec. Mod postural sway  L in front: >10 sec. Mod postural sway    Modified CTSIB:  Condition 1) 30 sec. No postural sway  Condition 2) 30 sec. Mod postural sway  Condition 3) 30 sec. Mod postural sway  Condition 4) 6 sec. LOB to the left     5xSTS:  With UE's crossed.   1) 22 sec  2) 21 sec    FOTO: 84/100      10 min Therapeutic Exercise:  [x] See flow sheet : bridges, SLR, standing marches, standing hip abduction   Rationale: increase ROM and increase strength to improve the patients ability to perform ADL's safely            With   [x] TE   [] TA   [] neuro   [] other: Patient Education: [x] Review HEP    [] Progressed/Changed HEP based on:   [] positioning   [] body mechanics   [] transfers   [] heat/ice application    [x] other: justin/phys; PT POC; strongly encouraged pt and pt's wife to go to [employer] 1x/wk and to use the nu-step for cardio/general LE strengthening     Pain Level (0-10 scale) post treatment: 0/10    ASSESSMENT:  See Assessment     [x]  See Plan of [facility], PT DPT 4/6/2018  10:15 AM

## 2018-04-06 NOTE — PROGRESS NOTES
[facility] [employer] [facility]  [address]  [place], [address]  Phone: [phone]  Fax: [phone]    Plan of Care/Statement of Necessity for Physical Therapy Services  2-15    Patient name: Jaime Francis  : 3/17/1931  Provider#: [id number]  Referral source: Priti Gray MD      Medical/Treatment Diagnosis: Unspecified abnormalities of gait and mobility [R26.9]     Prior Hospitalization: see medical history     Comorbidities: see evaluation  Prior Level of Function: see evaluation  Medications: Verified on Patient Summary List  Start of Care: 18      Onset Date: 2018   The Plan of Care and following information is based on the information from the initial evaluation. Assessment/ key information: Pt is an 80year old male presenting with LE weakness/balance dysfunction and will benefit from PT to address problem list below.     Evaluation Complexity History HIGH Complexity :3+ comorbidities / personal factors will impact the outcome/ POC ; Examination LOW Complexity : 1-2 Standardized tests and measures addressing body structure, function, activity limitation and / or participation in recreation  ;Presentation LOW Complexity : Stable, uncomplicated  ;Clinical Decision Making LOW Complexity : FOTO score of   Overall Complexity Rating: LOW     Problem List: pain affecting function, decrease ROM, decrease strength, impaired gait/ balance, decrease ADL/ functional abilitiies, decrease activity tolerance and decrease flexibility/ joint mobility   Treatment Plan may include any combination of the following: Therapeutic exercise, Therapeutic activities, Neuromuscular re-education, Physical agent/modality, Gait/balance training, Manual therapy, Patient education, Self Care training, Functional mobility training and Home safety training  Patient / Family readiness to learn indicated by: asking questions, trying to perform skills and interest  Persons(s) to be included in education: patient (P)  Barriers to Learning/Limitations: None  Patient Goal (s): see evalution  Patient Self Reported Health Status: excellent  Rehabilitation Potential: good    Long Term Goals: To be accomplished in 4-6 weeks:  1) Pt will be independent in initial HEP  2) Pt will report going to the gym at least 1x/wk and knowing appropriate exercises to perform  3) Pt will be able to perform condition 4 of the modCTSIB for > for =10 sec in order to demonstrate improvement in balance  4) Pt will perform 5xSTS in < or =18 sec in order to demonstrate improvement in functional strength     Frequency / Duration: Patient to be seen 1-2 times per week for 4-6 weeks. Patient/ Caregiver education and instruction: self care, activity modification and exercises    [x]  Plan of care has been reviewed with YARELIS    G-Fadia (GP)  Mobility   Current  CI= 1-19%   Goal  CI= 1-19%    The severity rating is based on clinical judgment and the FOTO Score score. Certification Period: 4/6/18- 7/1/18  Gabriela Corrales, PT 4/6/2018 10:15 AM    ________________________________________________________________________    I certify that the above Therapy Services are being furnished while the patient is under my care. I agree with the treatment plan and certify that this therapy is necessary.     [de-identified] Signature:____________________  Date:____________Time: _________

## 2018-04-10 ENCOUNTER — HOSPITAL ENCOUNTER (OUTPATIENT)
Dept: PHYSICAL THERAPY | Age: 83
Discharge: HOME | End: 2018-04-10
Payer: MEDICARE

## 2018-04-10 PROCEDURE — 97112 NEUROMUSCULAR REEDUCATION: CPT | Performed by: PHYSICAL THERAPIST

## 2018-04-10 PROCEDURE — 97110 THERAPEUTIC EXERCISES: CPT | Performed by: PHYSICAL THERAPIST

## 2018-04-10 NOTE — PROGRESS NOTES
PT DAILY TREATMENT NOTE - [facility] 2-15    Patient Name: Kael Parekh  Date:4/10/2018  : 3/17/1931  [x]  Patient  Verified  Payor: [place] MEDICARE / Plan: [place] MEDICARE PART A & B / Product Type: Medicare /    In time: 10:30 AM  Out time: 11:15 AM  Total Treatment Time (min): 45  Total Timed Codes (min): 45  1:1 Treatment Time ( [address] only): 30   Visit #: 2     Treatment Area: Unspecified abnormalities of gait and mobility [R26.9]    SUBJECTIVE  Pain Level (0-10 scale): 0/10  Any medication changes, allergies to medications, adverse drug reactions, diagnosis change, or new procedure performed?: [x] No    [] Yes (see summary sheet for update)  Subjective functional status/changes:   [] No changes reported  Pt states no pain; is doing well today    OBJECTIVE      30 min Therapeutic Exercise:  [x] See flow sheet : rec elliptical, step ups, bridges, SLR, standing marches, hip abduction, sit-->stand   Rationale: increase ROM, increase strength, improve balance and increase proprioception to improve the patients ability to perform ADL's, household chores, exercise without a fall risk     15 min Neuromuscular Re-education:  [x]  See flow sheet : crossover stepping; retro walking, tandem stance on foam; rockerboard, SLS   Rationale: increase ROM, increase strength, improve balance and increase proprioception  to improve the patients ability to perform ADL's, household chores, exercise without a fall risk            With   [] TE   [] TA   [] neuro   [] other: Patient Education: [x] Review HEP    [] Progressed/Changed HEP based on:   [] positioning   [] body mechanics   [] transfers   [] heat/ice application    [] other:      Other Objective/Functional Measures: n/a     Pain Level (0-10 scale) post treatment: 0/10    ASSESSMENT/Changes in Function:   Pt tolerated session well .  Challenged with anterior crossover stepping    Patient will continue to benefit from skilled PT services to modify and progress therapeutic interventions, address functional mobility deficits, address strength deficits, analyze and cue movement patterns and address imbalance/dizziness to attain remaining goals.      []  See Plan of Care  []  See progress note/recertification  []  See Discharge Summary         Progress towards goals / Updated goals:  nt    PLAN  []  Upgrade activities as tolerated     []  Continue plan of care  []  Update interventions per flow sheet       []  Discharge due to:_  []  Other:_      Jose Jackson, PT 4/10/2018  10:31 AM

## 2018-04-16 ENCOUNTER — HOSPITAL ENCOUNTER (OUTPATIENT)
Dept: PHYSICAL THERAPY | Age: 83
Discharge: HOME | End: 2018-04-16
Payer: MEDICARE

## 2018-04-16 PROCEDURE — 97110 THERAPEUTIC EXERCISES: CPT | Performed by: PHYSICAL THERAPIST

## 2018-04-16 PROCEDURE — 97112 NEUROMUSCULAR REEDUCATION: CPT | Performed by: PHYSICAL THERAPIST

## 2018-04-16 NOTE — PROGRESS NOTES
PT DAILY TREATMENT NOTE - [facility] 2-15    Patient Name: Dayton Shannon  Date:2018  : 3/17/1931  [x]  Patient  Verified  Payor: Leyda Cruz / Plan: [place] / Product Type: Medicare /    In time: 9:30 AM  Out time: 10:35 AM  Total Treatment Time (min): 65  Total Timed Codes (min): 65  1:1 Treatment Time ( [address] only): 40   Visit #: 3    Treatment Area: Unspecified abnormalities of gait and mobility [R26.9]    SUBJECTIVE  Pain Level (0-10 scale): 0/10  Any medication changes, allergies to medications, adverse drug reactions, diagnosis change, or new procedure performed?: [x] No    [] Yes (see summary sheet for update)  Subjective functional status/changes:   [] No changes reported  No complaints; he is doing well today    OBJECTIVE      40 min Therapeutic Exercise:  [x] See flow sheet : rec elliptical, step ups, bridges, SLR, standing marches, hip abduction, sit-->stand  Added mini squats, HR   Rationale: increase ROM, increase strength, improve balance and increase proprioception to improve the patients ability to perform ADL's, household chores, exercise without a fall risk     25 min Neuromuscular Re-education:  [x]  See flow sheet : crossover stepping; retro walking, tandem stance on foam; rockerboard, SLS; cone taps; tandem walking   Rationale: increase ROM, increase strength, improve balance and increase proprioception  to improve the patients ability to perform ADL's, household chores, exercise without a fall risk            With   [] TE   [] TA   [] neuro   [] other: Patient Education: [x] Review HEP    [] Progressed/Changed HEP based on:   [] positioning   [] body mechanics   [] transfers   [] heat/ice application    [] other:      Other Objective/Functional Measures: n/a     Pain Level (0-10 scale) post treatment: 0/10    ASSESSMENT/Changes in Function:   Pt progressing well; challenged with tandem walking and crossover walking    Patient will continue to benefit from skilled PT services to modify and progress therapeutic interventions, address functional mobility deficits, address strength deficits, analyze and cue movement patterns and address imbalance/dizziness to attain remaining goals.      []  See Plan of Care  []  See progress note/recertification  []  See Discharge Summary         Progress towards goals / Updated goals:  nt    PLAN  []  Upgrade activities as tolerated     []  Continue plan of care  []  Update interventions per flow sheet       []  Discharge due to:_  []  Other:_      Joanie Sánchez, PT 4/16/2018  9:35 AM

## 2018-04-23 ENCOUNTER — APPOINTMENT (OUTPATIENT)
Dept: PHYSICAL THERAPY | Age: 83
End: 2018-04-23
Payer: MEDICARE

## 2018-04-24 ENCOUNTER — HOSPITAL ENCOUNTER (OUTPATIENT)
Dept: PHYSICAL THERAPY | Age: 83
Discharge: HOME | End: 2018-04-24
Payer: MEDICARE

## 2018-04-24 PROCEDURE — 97110 THERAPEUTIC EXERCISES: CPT | Performed by: PHYSICAL THERAPIST

## 2018-04-24 PROCEDURE — 97112 NEUROMUSCULAR REEDUCATION: CPT | Performed by: PHYSICAL THERAPIST

## 2018-04-24 NOTE — PROGRESS NOTES
PT DAILY TREATMENT NOTE - [facility] 2-15    Patient Name: Patsy Mckeon  Date:2018  : 3/17/1931  [x]  Patient  Verified  Payor: [place] MEDICARE / Plan: [place] MEDICARE PART A & B / Product Type: Medicare /    In time: 2:00 PM  Out time: 2:55 PM  Total Treatment Time (min): 55  Total Timed Codes (min): 55  1:1 Treatment Time ( [address] only): 40   Visit #: 4    Treatment Area: Unspecified abnormalities of gait and mobility [R26.9]    SUBJECTIVE  Pain Level (0-10 scale): 0/10  Any medication changes, allergies to medications, adverse drug reactions, diagnosis change, or new procedure performed?: [x] No    [] Yes (see summary sheet for update)  Subjective functional status/changes:   [] No changes reported  No complaints; he is doing well today    OBJECTIVE      30 min Therapeutic Exercise:  [x] See flow sheet : rec elliptical, step ups, bridges, SLR, standing marches, hip abduction, sit-->stand  Added mini squats, HR   Rationale: increase ROM, increase strength, improve balance and increase proprioception to improve the patients ability to perform ADL's, household chores, exercise without a fall risk     25 min Neuromuscular Re-education:  [x]  See flow sheet : crossover stepping; retro walking, tandem stance on foam; rockerboard, SLS; cone taps; tandem walking   Rationale: increase ROM, increase strength, improve balance and increase proprioception  to improve the patients ability to perform ADL's, household chores, exercise without a fall risk            With   [] TE   [] TA   [] neuro   [] other: Patient Education: [x] Review HEP    [] Progressed/Changed HEP based on:   [] positioning   [] body mechanics   [] transfers   [] heat/ice application    [] other:      Other Objective/Functional Measures: n/a     Pain Level (0-10 scale) post treatment: 0/10    ASSESSMENT/Changes in Function:   Pt challenged with cone tapping, tandem walking    Patient will continue to benefit from skilled PT services to modify and progress therapeutic interventions, address functional mobility deficits, address strength deficits, analyze and cue movement patterns and address imbalance/dizziness to attain remaining goals.      []  See Plan of Care  []  See progress note/recertification  []  See Discharge Summary         Progress towards goals / Updated goals:  nt    PLAN  []  Upgrade activities as tolerated     []  Continue plan of care  []  Update interventions per flow sheet       []  Discharge due to:_  []  Other:_      Michael Powell, PT 4/24/2018  2:18 PM

## 2018-04-30 ENCOUNTER — HOSPITAL ENCOUNTER (OUTPATIENT)
Dept: PHYSICAL THERAPY | Age: 83
Discharge: HOME | End: 2018-04-30
Payer: MEDICARE

## 2018-04-30 PROCEDURE — 97112 NEUROMUSCULAR REEDUCATION: CPT | Performed by: PHYSICAL THERAPIST

## 2018-04-30 PROCEDURE — 97110 THERAPEUTIC EXERCISES: CPT | Performed by: PHYSICAL THERAPIST

## 2018-04-30 PROCEDURE — G8979 MOBILITY GOAL STATUS: HCPCS | Performed by: PHYSICAL THERAPIST

## 2018-04-30 PROCEDURE — G8980 MOBILITY D/C STATUS: HCPCS | Performed by: PHYSICAL THERAPIST

## 2018-04-30 NOTE — PROGRESS NOTES
PT DAILY TREATMENT/Progress NOTE - [facility] 2-15    Patient Name: Pamela Aguiar  Date:2018  : 3/17/1931  [x]  Patient  Verified  Payor: [place] MEDICARE / Plan: [place] MEDICARE PART A & B / Product Type: Medicare /    In time: 9:30 AM  Out time: 10:15 AM  Total Treatment Time (min): 45  Total Timed Codes (min): 45  1:1 Treatment Time ( [address] only): 39   Visit #: 5    Treatment Area: Unspecified abnormalities of gait and mobility [R26.9]    SUBJECTIVE  Pain Level (0-10 scale): 0/10  Any medication changes, allergies to medications, adverse drug reactions, diagnosis change, or new procedure performed?: [x] No    [] Yes (see summary sheet for update)  Subjective functional status/changes:   [] No changes reported  No complaints; he is doing well today. States he has not been going to the gym. OBJECTIVE      30 min Therapeutic Exercise:  [x] See flow sheet : Measurements taken. Reviewed HEP   Rationale: increase ROM, increase strength, improve balance and increase proprioception to improve the patients ability to perform ADL's, household chores, exercise without a fall risk     15 min Neuromuscular Re-education:  [x]  See flow sheet : crossover stepping; retro walking, cone taps; tandem walking   Rationale: increase ROM, increase strength, improve balance and increase proprioception  to improve the patients ability to perform ADL's, household chores, exercise without a fall risk            With   [] TE   [] TA   [] neuro   [] other: Patient Education: [x] Review HEP    [] Progressed/Changed HEP based on:   [] positioning   [] body mechanics   [] transfers   [] heat/ice application    [] other:      Other Objective/Functional Measures:   Posture/Observation: fair sitting/standing posture  Pt ambulates independently with good albin     SLS on R: 5-6 sec. Mod postural sway  SLS on L: 7-8 sec. Mod postural sway     Tandem stance:  R in front: >10 sec. Min postural sway  L in front: >10 sec.  Mn postural sway     Modified CTSIB:  Condition 1) 30 sec. No postural sway  Condition 2) 30 sec. Min postural sway  Condition 3) 30 sec. Mod postural sway  Condition 4) 20 sec. Large postural sway posterior     5xSTS:  With UE's crossed. 1) 14 sec (22 sec at evaluation)  2) 13 sec (21 sec at evaluation)     FOTO: 72/100 (84 at eval)     Pain Level (0-10 scale) post treatment: 0/10    ASSESSMENT/Changes in Function:   Pt has completed 5 skilled PT visits. He has met 3/4 PT goals. Pt demonstrates improvement in balance testing and functional strength testing. Again reviewed importance of pt continuing to exercise at the [facility], since he has a membership. Pt ready for D/C from PT at this time. []  See Plan of Care  []  See progress note/recertification  []  See Discharge Summary    G-Codes (GP)  Mobility    Goal  CI= 1-19%  D/C  CJ= 20-39%         Progress towards goals / Updated goals:  Long Term Goals:  To be accomplished in 4-6 weeks:  1) Pt will be independent in initial HEP MET  2) Pt will report going to the gym at least 1x/wk and knowing appropriate exercises to perform not met   3) Pt will be able to perform condition 4 of the modCTSIB for > for =10 sec in order to demonstrate improvement in balance MET  4) Pt will perform 5xSTS in < or =18 sec in order to demonstrate improvement in functional strength  MET    PLAN  D/C to [address] [place] [address], PT 4/30/2018  9:34 AM

## 2018-05-02 NOTE — PROGRESS NOTES
[facility] Physical Therapy  [address] [facility], [address] [place] [address]  Phone: [phone]  Fax: [phone]    Discharge Summary  2-15    Patient name: Micaela Trujillo  : 3/17/1931  Provider#:[id number]  Referral source: Nash Love MD      Medical/Treatment Diagnosis: Unspecified abnormalities of gait and mobility [R26.9]     Prior Hospitalization: see medical history     Comorbidities: see evaluation  Prior Level of Function:see evaluation  Medications: Verified on Patient Summary List    Start of Care: 18      Onset Date:see evaluation   Visits from Start of Care: 5     Missed Visits: see CC  Reporting Period : 18 to 18    Summary of care:   Posture/Observation: fair sitting/standing posture  Pt ambulates independently with good albin      SLS on R: 5-6 sec. Mod postural sway  SLS on L: 7-8 sec. Mod postural sway      Tandem stance:  R in front: >10 sec. Min postural sway  L in front: >10 sec. Mn postural sway      Modified CTSIB:  Condition 1) 30 sec. No postural sway  Condition 2) 30 sec. Min postural sway  Condition 3) 30 sec. Mod postural sway  Condition 4) 20 sec. Large postural sway posterior      5xSTS:  With UE's crossed. 1) 14 sec (22 sec at evaluation)  2) 13 sec (21 sec at evaluation)      FOTO: 72/100 (84 at eval)                          Pain Level (0-10 scale) post treatment: 0/10     ASSESSMENT  Pt has completed 5 skilled PT visits. He has met 3/4 PT goals. Pt demonstrates improvement in balance testing and functional strength testing. Again reviewed importance of pt continuing to exercise at the [facility], since he has a membership.  Pt ready for D/C from PT at this time.      Progress towards goals   Long Term Goals:   1) Pt will be independent in initial HEP MET  2) Pt will report going to the gym at least 1x/wk and knowing appropriate exercises to perform not met   3) Pt will be able to perform condition 4 of the modCTSIB for > for =10 sec in order to demonstrate improvement in balance MET  4) Pt will perform 5xSTS in < or =18 sec in order to demonstrate improvement in functional strength  MET      RECOMMENDATIONS:  [x]Discontinue therapy: [x]Patient has reached or is progressing toward set goals      []Patient is non-compliant or has abdicated      []Due to lack of appreciable progress towards set [address], PT 5/2/2018 3:19 PM

## 2018-05-03 ENCOUNTER — OFFICE VISIT (OUTPATIENT)
Dept: INTERNAL MEDICINE CLINIC | Age: 83
End: 2018-05-03

## 2018-05-03 VITALS
HEIGHT: 70 IN | DIASTOLIC BLOOD PRESSURE: 64 MMHG | HEART RATE: 63 BPM | WEIGHT: 165.2 LBS | OXYGEN SATURATION: 97 % | SYSTOLIC BLOOD PRESSURE: 112 MMHG | BODY MASS INDEX: 23.65 KG/M2 | TEMPERATURE: 98.7 F

## 2018-05-03 DIAGNOSIS — T17.908S ASPIRATION INTO AIRWAY, SEQUELA: Primary | ICD-10-CM

## 2018-05-03 DIAGNOSIS — J06.9 UPPER RESPIRATORY TRACT INFECTION, UNSPECIFIED TYPE: ICD-10-CM

## 2018-05-03 RX ORDER — ALBUTEROL SULFATE 90 UG/1
2 AEROSOL, METERED RESPIRATORY (INHALATION)
Qty: 1 INHALER | Refills: 11 | Status: SHIPPED | OUTPATIENT
Start: 2018-05-03 | End: 2019-04-03 | Stop reason: SDUPTHER

## 2018-05-03 NOTE — PROGRESS NOTES
Chief Complaint   Patient presents with    Eating Concern     decreased appetite - not taking mitazapine \"a long time ago\" -down about 2 lbs since last visit 1/2018.  Cough     x 2 days non-productive.

## 2018-05-04 NOTE — PROGRESS NOTES
HPI:  Nolberto Patient is a 80y.o. year old male who is here for a routine visit:    For a follow-up visit. Over the last few weeks he has had increasing problems with appetite. Lost about 7 pounds last visit. He has increased difficulty with swallowing. He has had some coughing. No sputum production. Had some use of albuterol. Fevers or chills. Does have some intermittent episodes where he chokes and actually brings up food. He has completed his chemotherapy and blood counts are back to normal.  No melena hematochezia. His wife is concerned that his hiatal hernia may have returned. She is also concerned that he may be aspirating. Past Medical History:   Diagnosis Date    Asthma     Autoimmune disease ([facility]     CLL    [facility] ([facility] 6/20/14    MULTIPLE SCCAs EXCISED, ALSO BCCAs    CLL (chronic lymphocytic leukemia) (HCC)     no treatment    GERD (gastroesophageal reflux disease)     Hypercholesterolemia     Hypertension     Liver disease 6/6/2014    ADMITTED FOR LIVER DYSFUNCTION, NOT DIAGNOSED, NOW RESOLVED    Pulmonary embolism ([facility] 9/2015    Thyroid disease     Unspecified adverse effect of anesthesia     \"out of it for 2 weeks\"    Unspecified sleep apnea     had sleep study but no follow up       Past Surgical History:   Procedure Laterality Date    HX ABDOMINAL LAPAROSCOPY  4/2016    HX COLONOSCOPY      X2, MOST RECNT WAS IN 2013    HX HEENT      tonsillectomy    HX HERNIA REPAIR  4/13/16    paraesophageal hernia repair Dr Kip Ignacio Left     Frozen shoulder manipulation       Prior to Admission medications    Medication Sig Start Date End Date Taking? Authorizing Provider   OTHER Hemp Oil 4 drops per day. Yes Historical Provider   albuterol (PROVENTIL HFA, VENTOLIN HFA, PROAIR HFA) 90 mcg/actuation inhaler Take 2 Puffs by inhalation every six (6) hours as needed for Wheezing.  5/3/18  Yes Marjan Leigh III, MD   levothyroxine (SYNTHROID) 150 mcg tablet TAKE ONE TABLET BY MOUTH DAILY BEFORE BREAKFAST. DOSE ADJUSTMENT 2/19/18  Yes Marjan Leigh III, MD   hydroCHLOROthiazide (HYDRODIURIL) 25 mg tablet Take 1 Tab by mouth daily. 1/3/18  Yes Marjan Leigh III, MD   pravastatin (PRAVACHOL) 20 mg tablet Take 1 Tab by mouth nightly. 1/3/18  Yes Marjan Leigh III, MD   aspirin delayed-release 325 mg tablet Take 1 Tab by mouth daily. 12/26/17  Yes Sherry Knowles MD   amLODIPine (NORVASC) 10 mg tablet TAKE ONE TABLET BY MOUTH ONCE DAILY 9/18/17  Yes Katty Maria MD   bismuth subsalicylate (PEPTO-BISMOL MAXIMUM STRENGTH) 525 mg/15 mL susp Take 30 mL by mouth every six (6) hours as needed. Yes Historical Provider   cyanocobalamin (VITAMIN B-12) 1,000 mcg sublingual tablet Take 1,000 mcg by mouth daily. Yes Historical Provider   diphenoxylate-atropine (LOMOTIL) 2.5-0.025 mg per tablet Take  by mouth four (4) times daily as needed for Diarrhea. Yes Historical Provider   raNITIdine (ZANTAC 75) 75 mg tablet Take 150 mg by mouth nightly. Yes Historical Provider   co-enzyme Q-10 (CO Q-10) 100 mg capsule Take 100 mg by mouth daily. Yes Historical Provider   calcium carbonate (TUMS) 200 mg calcium (500 mg) chew Take 1 Tab by mouth as needed (heartburn). Yes Historical Provider   MEN'S MULTI-VITAMIN PO Take 2 each by mouth daily. Yes Historical Provider   cholecalciferol, vitamin D3, (VITAMIN D3) 2,000 unit tab Take 1 tablet by mouth daily. Yes Historical Provider   nystatin (MYCOSTATIN) powder Apply to the left groin 4 times a day 1/8/18   Marjan Leigh III, MD   mirtazapine (REMERON) 15 mg tablet Take 1 Tab by mouth nightly. 11/17/17   Marjan Leigh III, MD   colestipol (COLESTID) 1 gram tablet Take 1 g by mouth daily. 11/11/16   Historical Provider   lactobacillus combination no.4 (PROBIOTIC) 3 billion cell cap Take  by mouth. Ultimate Melanie Probiotic 30 billion cx per cap - 1 pill per day.     Historical Provider       Social History     Social History  Marital status:      Spouse name: N/A    Number of children: N/A    Years of education: N/A     Occupational History    Not on file. Social History Main Topics    Smoking status: Never Smoker    Smokeless tobacco: Never Used    Alcohol use 2.5 oz/week     5 Standard drinks or equivalent per week      Comment: occasional    Drug use: No    Sexual activity: Not on file     Other Topics Concern    Not on file     Social History Narrative          ROS  Per HPI    Visit Vitals    /64    Pulse 63    Temp 98.7 °F (37.1 °C) (Oral)    Ht 5' 10\" (1.778 m)    Wt 165 lb 3.2 oz (74.9 kg)    SpO2 97%    BMI 23.7 kg/m2         Physical Exam   Physical Examination: General appearance - alert, well appearing, and in no distress  Ears - bilateral TM's and external ear canals normal  Mouth - mucous membranes moist, pharynx normal without lesions  Neck - supple, no significant adenopathy  Lymphatics - no palpable lymphadenopathy, no hepatosplenomegaly  Chest - clear to auscultation, no wheezes, rales or rhonchi, symmetric air entry  Heart - normal rate and regular rhythm  Abdomen - soft, nontender, nondistended, no masses or organomegaly      Assessment/Plan:  Diagnoses and all orders for this visit:    1. Aspiration into airway, sequela - ? Recurrent. Will check UGI and decide about therapy  -     XR BA SWALLOW ESOPHOGRAM; Future    2. Upper respiratory tract infection, unspecified type - with some airway irritation. Will use albuterol 3-4 times per day and call for fever. -     albuterol (PROVENTIL HFA, VENTOLIN HFA, PROAIR HFA) 90 mcg/actuation inhaler; Take 2 Puffs by inhalation every six (6) hours as needed for Wheezing. 3. Weight loss - has been using Hemp oil and may have helped. Remeron did not help in the past.      Follow-up Disposition: after the above.         Advised him to call back or return to office if symptoms worsen/change/persist.  Discussed expected course/resolution/complications of diagnosis in detail with patient. Medication risks/benefits/costs/interactions/alternatives discussed with patient. He was given an after visit summary which includes diagnoses, current medications, & vitals. He expressed understanding with the diagnosis and plan.

## 2018-05-07 ENCOUNTER — TELEPHONE (OUTPATIENT)
Dept: INTERNAL MEDICINE CLINIC | Age: 83
End: 2018-05-07

## 2018-05-09 ENCOUNTER — HOSPITAL ENCOUNTER (OUTPATIENT)
Dept: GENERAL RADIOLOGY | Age: 83
Discharge: HOME | End: 2018-05-09
Attending: INTERNAL MEDICINE
Payer: MEDICARE

## 2018-05-09 DIAGNOSIS — T17.908S ASPIRATION INTO AIRWAY, SEQUELA: ICD-10-CM

## 2018-05-09 PROCEDURE — 74220 X-RAY XM ESOPHAGUS 1CNTRST: CPT

## 2018-05-10 ENCOUNTER — TELEPHONE (OUTPATIENT)
Dept: INTERNAL MEDICINE CLINIC | Age: 83
End: 2018-05-10

## 2018-05-11 ENCOUNTER — TELEPHONE (OUTPATIENT)
Dept: INTERNAL MEDICINE CLINIC | Age: 83
End: 2018-05-11

## 2018-05-11 NOTE — TELEPHONE ENCOUNTER
Spoke with pt spouse who is on hippa. 2 pt identifiers. Discussed results of barium swallow. Per SRJ, pt needs to f/u with GI. Wife states that they have seen Que and were not pleased. She would possibly like to see JERSEY MARTINEZ. I explained that she will need to call to see about changing MD's in the same practice, and that Dr. JERSEY MARTINEZ is at [facility]. She will contact office and let me know outcome and if she needs help with appt.

## 2018-05-11 NOTE — TELEPHONE ENCOUNTER
[phone] pt's wife says after test results they were told to  see gastro , tried to make an appt with dr Gerhardt Booker since he has seen him before. Was told they could not get in until July, but they could see dr Richar Caceres in June. She says that she was told that since he has not been seen in a couple of years that it would probably be a good idea to get a referral from dr Dalia Varghese.

## 2018-05-11 NOTE — TELEPHONE ENCOUNTER
Requested an asap appt. With GI [id number] per Dr Celso Kendrick Dx aspiration and am waiting for a call back. Angie Stallworth

## 2018-05-11 NOTE — TELEPHONE ENCOUNTER
Dr Ranjan Deluca is working Pt. In this afternoon, records faxed per request and they will contact his wife.

## 2018-05-14 ENCOUNTER — HOSPITAL ENCOUNTER (OUTPATIENT)
Age: 83
Setting detail: OUTPATIENT SURGERY
Discharge: HOME | End: 2018-05-14
Attending: INTERNAL MEDICINE | Admitting: INTERNAL MEDICINE
Payer: MEDICARE

## 2018-05-14 ENCOUNTER — ANESTHESIA (OUTPATIENT)
Dept: ENDOSCOPY | Age: 83
End: 2018-05-14
Payer: MEDICARE

## 2018-05-14 ENCOUNTER — ANESTHESIA EVENT (OUTPATIENT)
Dept: ENDOSCOPY | Age: 83
End: 2018-05-14
Payer: MEDICARE

## 2018-05-14 VITALS
TEMPERATURE: 97.7 F | DIASTOLIC BLOOD PRESSURE: 81 MMHG | OXYGEN SATURATION: 100 % | SYSTOLIC BLOOD PRESSURE: 136 MMHG | HEART RATE: 48 BPM | RESPIRATION RATE: 17 BRPM | WEIGHT: 163 LBS | HEIGHT: 70 IN | BODY MASS INDEX: 23.34 KG/M2

## 2018-05-14 PROCEDURE — 76060000031 HC ANESTHESIA FIRST 0.5 HR: Performed by: INTERNAL MEDICINE

## 2018-05-14 PROCEDURE — C1726 CATH, BAL DIL, NON-VASCULAR: HCPCS | Performed by: INTERNAL MEDICINE

## 2018-05-14 PROCEDURE — 77030018712 HC DEV BLLN INFL BSC -B: Performed by: INTERNAL MEDICINE

## 2018-05-14 PROCEDURE — 74011250636 HC RX REV CODE- 250/636

## 2018-05-14 PROCEDURE — 76040000019: Performed by: INTERNAL MEDICINE

## 2018-05-14 PROCEDURE — 74011000250 HC RX REV CODE- 250

## 2018-05-14 RX ORDER — FENTANYL CITRATE 50 UG/ML
200 INJECTION, SOLUTION INTRAMUSCULAR; INTRAVENOUS
Status: DISCONTINUED | OUTPATIENT
Start: 2018-05-14 | End: 2018-05-14 | Stop reason: HOSPADM

## 2018-05-14 RX ORDER — SODIUM CHLORIDE 9 MG/ML
INJECTION, SOLUTION INTRAVENOUS
Status: DISCONTINUED | OUTPATIENT
Start: 2018-05-14 | End: 2018-05-14 | Stop reason: HOSPADM

## 2018-05-14 RX ORDER — LIDOCAINE HYDROCHLORIDE 20 MG/ML
INJECTION, SOLUTION EPIDURAL; INFILTRATION; INTRACAUDAL; PERINEURAL AS NEEDED
Status: DISCONTINUED | OUTPATIENT
Start: 2018-05-14 | End: 2018-05-14 | Stop reason: HOSPADM

## 2018-05-14 RX ORDER — ATROPINE SULFATE 0.1 MG/ML
0.5 INJECTION INTRAVENOUS
Status: DISCONTINUED | OUTPATIENT
Start: 2018-05-14 | End: 2018-05-14 | Stop reason: HOSPADM

## 2018-05-14 RX ORDER — PROPOFOL 10 MG/ML
INJECTION, EMULSION INTRAVENOUS AS NEEDED
Status: DISCONTINUED | OUTPATIENT
Start: 2018-05-14 | End: 2018-05-14 | Stop reason: HOSPADM

## 2018-05-14 RX ORDER — PANTOPRAZOLE SODIUM 40 MG/1
40 TABLET, DELAYED RELEASE ORAL
Qty: 30 TAB | Refills: 6 | Status: SHIPPED | OUTPATIENT
Start: 2018-05-14 | End: 2018-06-08 | Stop reason: ALTCHOICE

## 2018-05-14 RX ORDER — SODIUM CHLORIDE 0.9 % (FLUSH) 0.9 %
5-10 SYRINGE (ML) INJECTION AS NEEDED
Status: DISCONTINUED | OUTPATIENT
Start: 2018-05-14 | End: 2018-05-14 | Stop reason: HOSPADM

## 2018-05-14 RX ORDER — SODIUM CHLORIDE 0.9 % (FLUSH) 0.9 %
5-10 SYRINGE (ML) INJECTION EVERY 8 HOURS
Status: DISCONTINUED | OUTPATIENT
Start: 2018-05-14 | End: 2018-05-14 | Stop reason: HOSPADM

## 2018-05-14 RX ORDER — EPINEPHRINE 0.1 MG/ML
1 INJECTION INTRACARDIAC; INTRAVENOUS
Status: DISCONTINUED | OUTPATIENT
Start: 2018-05-14 | End: 2018-05-14 | Stop reason: HOSPADM

## 2018-05-14 RX ORDER — NALOXONE HYDROCHLORIDE 0.4 MG/ML
0.4 INJECTION, SOLUTION INTRAMUSCULAR; INTRAVENOUS; SUBCUTANEOUS
Status: DISCONTINUED | OUTPATIENT
Start: 2018-05-14 | End: 2018-05-14 | Stop reason: HOSPADM

## 2018-05-14 RX ORDER — DEXTROMETHORPHAN/PSEUDOEPHED 2.5-7.5/.8
1.2 DROPS ORAL
Status: DISCONTINUED | OUTPATIENT
Start: 2018-05-14 | End: 2018-05-14 | Stop reason: HOSPADM

## 2018-05-14 RX ORDER — SODIUM CHLORIDE 9 MG/ML
100 INJECTION, SOLUTION INTRAVENOUS CONTINUOUS
Status: DISCONTINUED | OUTPATIENT
Start: 2018-05-14 | End: 2018-05-14 | Stop reason: HOSPADM

## 2018-05-14 RX ORDER — FLUMAZENIL 0.1 MG/ML
0.2 INJECTION INTRAVENOUS
Status: DISCONTINUED | OUTPATIENT
Start: 2018-05-14 | End: 2018-05-14 | Stop reason: HOSPADM

## 2018-05-14 RX ORDER — MIDAZOLAM HYDROCHLORIDE 1 MG/ML
.25-1 INJECTION, SOLUTION INTRAMUSCULAR; INTRAVENOUS
Status: DISCONTINUED | OUTPATIENT
Start: 2018-05-14 | End: 2018-05-14 | Stop reason: HOSPADM

## 2018-05-14 RX ADMIN — PROPOFOL 30 MG: 10 INJECTION, EMULSION INTRAVENOUS at 11:01

## 2018-05-14 RX ADMIN — PROPOFOL 20 MG: 10 INJECTION, EMULSION INTRAVENOUS at 11:04

## 2018-05-14 RX ADMIN — PROPOFOL 90 MG: 10 INJECTION, EMULSION INTRAVENOUS at 10:59

## 2018-05-14 RX ADMIN — PROPOFOL 20 MG: 10 INJECTION, EMULSION INTRAVENOUS at 11:06

## 2018-05-14 RX ADMIN — SODIUM CHLORIDE: 9 INJECTION, SOLUTION INTRAVENOUS at 10:48

## 2018-05-14 RX ADMIN — LIDOCAINE HYDROCHLORIDE 40 MG: 20 INJECTION, SOLUTION EPIDURAL; INFILTRATION; INTRACAUDAL; PERINEURAL at 10:59

## 2018-05-14 NOTE — ANESTHESIA PREPROCEDURE EVALUATION
Anesthetic History   No history of anesthetic complications            Review of Systems / Medical History  Patient summary reviewed, nursing notes reviewed and pertinent labs reviewed    Pulmonary        Sleep apnea: No treatment    Asthma        Neuro/Psych   Within defined limits           Cardiovascular    Hypertension                   GI/Hepatic/Renal     GERD           Endo/Other      Hypothyroidism  Anemia     Other Findings   Comments: CLL (chronic lymphocytic leukemia)            Physical Exam    Airway  Mallampati: II  TM Distance: > 6 cm  Neck ROM: normal range of motion   Mouth opening: Normal     Cardiovascular  Regular rate and rhythm,  S1 and S2 normal,  no murmur, click, rub, or gallop             Dental  No notable dental hx       Pulmonary  Breath sounds clear to auscultation               Abdominal  GI exam deferred       Other Findings            Anesthetic Plan    ASA: 3  Anesthesia type: MAC          Induction: Intravenous  Anesthetic plan and risks discussed with: Patient No Yes

## 2018-05-14 NOTE — PROGRESS NOTES
Received report from anesthesia staff on vital signs and status of patient.     Scope precleaned at bedside by Ad Barron

## 2018-05-14 NOTE — IP AVS SNAPSHOT
[address] [facility] 13 
[phone] Patient: Savannah Dominguez MRN: [id number] BSP:[id number] About your hospitalization You were admitted on:  May 14, 2018 You last received care in the:  [facility] ENDOSCOPY You were discharged on:  May 14, 2018 Why you were hospitalized Your primary diagnosis was:  Not on File Follow-up Information None Your Scheduled Appointments Friday June 08, 2018 10:30 AM EDT PHYSICAL with Meng Jones MD  
[facility] [address] [facility]  
[phone] Discharge Orders None A check kashif indicates which time of day the medication should be taken. My Medications START taking these medications Instructions Each Dose to Equal  
 Morning Noon Evening Bedtime  
 pantoprazole 40 mg tablet Commonly known as:  PROTONIX Your last dose was: Your next dose is: Take 1 Tab by mouth daily (with breakfast) for 30 days. 40 mg CONTINUE taking these medications Instructions Each Dose to Equal  
 Morning Noon Evening Bedtime  
 albuterol 90 mcg/actuation inhaler Commonly known as:  PROVENTIL HFA, VENTOLIN HFA, PROAIR HFA Your last dose was: Your next dose is: Take 2 Puffs by inhalation every six (6) hours as needed for Wheezing. 2 Puff  
    
   
   
   
  
 amLODIPine 10 mg tablet Commonly known as:  Ruth Ryan Your last dose was: Your next dose is: TAKE ONE TABLET BY MOUTH ONCE DAILY  
     
   
   
   
  
 aspirin delayed-release 325 mg tablet Your last dose was: Your next dose is: Take 1 Tab by mouth daily. 325 mg  
    
   
   
   
  
 bismuth subsalicylate 984 CH/99 mL Susp Commonly known as:  PEPTO-BISMOL MAXIMUM STRENGTH  
   
 Your last dose was: Your next dose is: Take 30 mL by mouth every six (6) hours as needed. 30 mL  
    
   
   
   
  
 calcium carbonate 200 mg calcium (500 mg) Chew Commonly known as:  TUMS Your last dose was: Your next dose is: Take 1 Tab by mouth as needed (heartburn). 1 Tab  
    
   
   
   
  
 co-enzyme Q-10 100 mg capsule Commonly known as:  CO Q-10 Your last dose was: Your next dose is: Take 100 mg by mouth daily. 100 mg  
    
   
   
   
  
 colestipol 1 gram tablet Commonly known as:  COLESTID Your last dose was: Your next dose is: Take 1 g by mouth daily. 1 g  
    
   
   
   
  
 hydroCHLOROthiazide 25 mg tablet Commonly known as:  HYDRODIURIL Your last dose was: Your next dose is: Take 1 Tab by mouth daily. 25 mg  
    
   
   
   
  
 levothyroxine 150 mcg tablet Commonly known as:  SYNTHROID Your last dose was: Your next dose is: TAKE ONE TABLET BY MOUTH DAILY BEFORE BREAKFAST. DOSE ADJUSTMENT  
     
   
   
   
  
 LOMOTIL 2.5-0.025 mg per tablet Generic drug:  diphenoxylate-atropine Your last dose was: Your next dose is: Take  by mouth four (4) times daily as needed for Diarrhea. MEN'S MULTI-VITAMIN PO Your last dose was: Your next dose is: Take 2 each by mouth daily. 2 Each  
    
   
   
   
  
 mirtazapine 15 mg tablet Commonly known as:  Janel Captain Your last dose was: Your next dose is: Take 1 Tab by mouth nightly. 15 mg  
    
   
   
   
  
 nystatin powder Commonly known as:  MYCOSTATIN Your last dose was: Your next dose is:    
   
   
 Apply to the left groin 4 times a day OTHER Your last dose was: Your next dose is: Hemp Oil 4 drops per day. OTHER Your last dose was: Your next dose is:    
   
   
 \"alteril\" contains valerian, per pt's wife  
     
   
   
   
  
 pravastatin 20 mg tablet Commonly known as:  PRAVACHOL Your last dose was: Your next dose is: Take 1 Tab by mouth nightly. 20 mg PROBIOTIC 3 billion cell Cap Generic drug:  lactobacillus combination no.4 Your last dose was: Your next dose is: Take  by mouth. Ultimate Emlanie Probiotic 30 billion cx per cap - 1 pill per day. VITAMIN B-12 1,000 mcg sublingual tablet Generic drug:  cyanocobalamin Your last dose was: Your next dose is: Take 1,000 mcg by mouth daily. 1000 mcg VITAMIN D3 2,000 unit Tab Generic drug:  cholecalciferol (vitamin D3) Your last dose was: Your next dose is: Take 1 tablet by mouth daily. 1 Tab ZANTAC 75 75 mg tablet Generic drug:  raNITIdine Your last dose was: Your next dose is: Take 150 mg by mouth nightly. 150 mg Where to Get Your Medications Information on where to get these meds will be given to you by the nurse or doctor. ! Ask your nurse or doctor about these medications  
  pantoprazole 40 mg tablet Discharge Instructions [address], [address] [place] [address] EGD DISCHARGE INSTRUCTIONS [place] De[place]oh Vogel 
[id number] 
3/17/1931 Discomfort: 
Sore throat- throat lozenges or warm salt water gargle 
redness at IV site- apply warm compress to area; if redness or soreness persist- contact your physician Gaseous discomfort- walking, belching will help relieve any discomfort You may not operate a vehicle for 12 hours You may not engage in an occupation involving machinery or appliances for rest of today You may not drink alcoholic beverages for at least 12 hours Avoid making any critical decisions for at least 24 hour DIET You may resume your regular diet  however -  remember your colon is empty and a heavy meal will produce gas. Avoid these foods:  vegetables, fried / greasy foods, carbonated drinks ACTIVITY You may resume your normal daily activities Spend the remainder of the day resting -  avoid any strenuous activity. CALL M.D. ANY SIGN OF Increasing pain, nausea, vomiting Abdominal distension (swelling) New increased bleeding (oral or rectal) Fever (chills) Pain in chest area Bloody discharge from nose or mouth Shortness of breath Follow-up Instructions: 
 Call Dr. Angela Moss for any questions or problems and follow up with him in 4 to 6 weeks Follow up with your surgeon, Dr. John Faith in 1 month Telephone # [phone] ENDOSCOPY FINDINGS: 
 Your endoscopy showed hiatal hernia, stricture, I dilated and esophagitis ( inflammation in your esophagus). Signed By: Angela Moss MD   
 5/14/2018  11:25 AM 
  
 
  
Hiatal Hernia: Care Instructions Your Care Instructions A hiatal hernia occurs when part of the stomach bulges into the chest cavity. A hiatal hernia may allow stomach acid and juices to back up into the esophagus (acid reflux). This can cause a feeling of burning, warmth, heat, or pain behind the breastbone. This feeling may often occur after you eat, soon after you lie down, or when you bend forward, and it may come and go. You also may have a sour taste in your mouth. These symptoms are commonly known as heartburn or reflux. But not all hiatal hernias cause symptoms. Follow-up care is a key part of your treatment and safety.  Be sure to make and go to all appointments, and call your doctor if you are having problems. It's also a good idea to know your test results and keep a list of the medicines you take. How can you care for yourself at home? · Take your medicines exactly as prescribed. Call your doctor if you think you are having a problem with your medicine. · Do not take aspirin or other nonsteroidal anti-inflammatory drugs (NSAIDs), such as ibuprofen (Advil, Motrin) or naproxen (Aleve), unless your doctor says it is okay. Ask your doctor what you can take for pain. · Your doctor may recommend over-the-counter medicine. For mild or occasional indigestion, antacids such as Tums, Gaviscon, Maalox, or Mylanta may help. Your doctor also may recommend over-the-counter acid reducers, such as famotidine (Pepcid AC), cimetidine (Tagamet HB), ranitidine (Zantac 75 and Zantac 150), or omeprazole (Prilosec). Read and follow all instructions on the label. If you use these medicines often, talk with your doctor. · Change your eating habits. ¨ It's best to eat several small meals instead of two or three large meals. ¨ After you eat, wait 2 to 3 hours before you lie down. Late-night snacks aren't a good idea. ¨ Chocolate, mint, and alcohol can make heartburn worse. They relax the valve between the esophagus and the stomach. ¨ Spicy foods, foods that have a lot of acid (like tomatoes and oranges), and coffee can make heartburn symptoms worse in some people. If your symptoms are worse after you eat a certain food, you may want to stop eating that food to see if your symptoms get better. · Do not smoke or chew tobacco. 
· If you get heartburn at night, raise the head of your bed 6 to 8 inches by putting the frame on blocks or placing a foam wedge under the head of your mattress. (Adding extra pillows does not work.) · Do not wear tight clothing around your middle. · Lose weight if you need to. Losing just 5 to 10 pounds can help. When should you call for help? Call your doctor now or seek immediate medical care if: ? · You have new or worse belly pain. ? · You are vomiting. ? Watch closely for changes in your health, and be sure to contact your doctor if: 
? · You have new or worse symptoms of indigestion. ? · You have trouble or pain swallowing. ? · You are losing weight. ? · You do not get better as expected. Where can you learn more? Go to http://spring-aamir.info/. Enter R078 in the search box to learn more about \"Hiatal Hernia: Care Instructions. \" Current as of: May 12, 2017 Content Version: 11.4 © 6154-4671 [employer]. Care instructions adapted under license by [employer] (which disclaims liability or warranty for this information). If you have questions about a medical condition or this instruction, always ask your healthcare professional. [address] any warranty or liability for your use of this information. Esophagitis: Care Instructions Your Care Instructions Esophagitis (say \"ih-sof-uh-JY-tus\") is irritation of the esophagus, the tube that carries food from your throat to your stomach. Acid reflux is the most common cause of this condition. When you have reflux, stomach acid and juices flow upward. This can cause pain or a burning feeling in your chest. You may have a sore throat. It may be hard to swallow. Other causes of this condition include some medicines and supplements. Allergies or an infection can also cause it. Your doctor will ask about your symptoms and past health. He or she might do tests to find the cause of your symptoms. Treatment depends on what is causing the problem. Treatment might include changing your diet or taking medicine to relieve your symptoms. It might also include changing a medicine that is causing your symptoms.  
If you have reflux, medicine that reduces the stomach acid helps your body heal. It might take 1 to 3 weeks to heal. 
 Follow-up care is a key part of your treatment and safety. Be sure to make and go to all appointments, and call your doctor if you are having problems. It's also a good idea to know your test results and keep a list of the medicines you take. How can you care for yourself at home? · If you have acid reflux, your doctor may recommend that you: 
¨ Eat several small meals instead of two or three large meals. After you eat, wait 2 to 3 hours before you lie down. ¨ Avoid chocolate, mint, alcohol, and spicy foods. ¨ Don't smoke or use smokeless tobacco. Smoking can make this condition worse. If you need help quitting, talk to your doctor about stop-smoking programs and medicines. These can increase your chances of quitting for good. ¨ Raise the head of your bed 6 to 8 inches if you have symptoms at night. ¨ Lose weight if you are overweight. ¨ Take an over-the-counter antacid, such as Maalox, Mylanta, or Tums. Be careful when you take over-the-counter antacid medicines. Many of these medicines have aspirin in them. Read the label to make sure that you are not taking more than the recommended dose. Too much aspirin can be harmful. ¨ Take stronger acid reducers. Examples are famotidine (such as Pepcid), omeprazole (such as Prilosec), and ranitidine (such as Zantac). · If your condition is caused by infection, allergy, or other problems, use the medicine or treatments that your doctor recommends. · Be safe with medicines. Take your medicines exactly as prescribed. Call your doctor if you think you are having a problem with your medicine. When should you call for help? Call your doctor now or seek immediate medical care if: 
? · You have new or worse belly pain. ? · You are vomiting. ? Watch closely for changes in your health, and be sure to contact your doctor if: 
? · You have new or worse symptoms of reflux. ? · You have trouble or pain swallowing. ? · You are losing weight. ? · You do not get better as expected. Where can you learn more? Go to http://spring-aamir.info/. Enter D731 in the search box to learn more about \"Esophagitis: Care Instructions. \" Current as of: May 12, 2017 Content Version: 11.4 © 1870-7161 [employer]. Care instructions adapted under license by [employer] (which disclaims liability or warranty for this information). If you have questions about a medical condition or this instruction, always ask your healthcare professional. [address] any warranty or liability for your use of this information. Esophageal Dilation: What to Expect at [facility] Your Recovery After you have esophageal dilation, you will stay at the hospital or surgery center for 1 to 2 hours. This will allow the medicine to wear off. You will be able to go home after your doctor or nurse checks to make sure you are not having any problems. This care sheet gives you a general idea about how long it will take for you to recover. But each person recovers at a different pace. Follow the steps below to get better as quickly as possible. How can you care for yourself at home? Activity ? · Rest as much as you need to after you go home. ? · You should be able to go back to your usual activities the day after the procedure. Diet ? · Follow your doctor's directions for eating after the procedure. ? · Drink plenty of fluids (unless your doctor has told you not to). Medicines ? · Your doctor will tell you if and when you can restart your medicines. He or she will also give you instructions about taking any new medicines. ? · If you take blood thinners, such as warfarin (Coumadin), clopidogrel (Plavix), or aspirin, be sure to talk to your doctor. He or she will tell you if and when to start taking those medicines again. Make sure that you understand exactly what your doctor wants you to do. ? · If you have a sore throat the day after the procedure, use an over-the-counter spray to numb your throat. Sucking on throat lozenges and gargling with warm salt water may also help relieve your symptoms. Follow-up care is a key part of your treatment and safety. Be sure to make and go to all appointments, and call your doctor if you are having problems. It's also a good idea to know your test results and keep a list of the medicines you take. When should you call for help? Call 911 anytime you think you may need emergency care. For example, call if: 
? · You passed out (lost consciousness). ? · You have trouble breathing. ? · Your stools are maroon or very bloody ? Call your doctor now or seek immediate medical care if: 
? · You have new or worse belly pain. ? · You have a fever. ? · You have new or more blood in your stools. ? · You are sick to your stomach and cannot drink fluids. ? · You cannot pass stools or gas. ? · You have pain that does not get better after you take pain medicine. ? Watch closely for changes in your health, and be sure to contact your doctor if: 
? · Your throat still hurts after a day or two. ? · You do not get better as expected. Where can you learn more? Go to http://spring-aamir.info/. Enter P407 in the search box to learn more about \"Esophageal Dilation: What to Expect at Home. \" Current as of: May 12, 2017 Content Version: 11.4 © 0589-4348 [employer]. Care instructions adapted under license by [employer] (which disclaims liability or warranty for this information). If you have questions about a medical condition or this instruction, always ask your healthcare professional. [place] [address] any warranty or liability for your use of this information. ACO Transitions of Care Introducing [employer] [address] Tyra Kruse offers a voluntary care coordination program to provide high quality service and care to [facility] fee-for-service beneficiaries. Billpedro Love was designed to help you enhance your health and well-being through the following services: ? Transitions of Care  support for individuals who are transitioning from one care setting to another (example: Hospital to home). ? Chronic and Complex Care Coordination  support for individuals and caregivers of those with serious or chronic illnesses or with more than one chronic (ongoing) condition and those who take a number of different medications. If you meet specific medical criteria, a [address] [place] [address] Rd may call you directly to coordinate your care with your primary care physician and your other care providers. For questions about the [facility] programs, please, contact your physicians office. For general questions or additional information about Accountable Care Organizations: 
Please visit www.medicare.gov/acos. html or call 1-800-MEDICARE (7-548.900.8650) TTY users should call 3-536.458.5594. Introducing hospitals & HEALTH SERVICES! Dear Pernell Reeves: Thank you for requesting a CriticalBlue account. Our records indicate that you already have an active CriticalBlue account. You can access your account anytime at [email or web address] Did you know that you can access your hospital and ER discharge instructions at any time in CriticalBlue? You can also review all of your test results from your hospital stay or ER visit. Additional Information If you have questions, please visit the Frequently Asked Questions section of the CriticalBlue website at [email or web address] Remember, CriticalBlue is NOT to be used for urgent needs. For medical emergencies, dial 911. Now available from your iPhone and Android! Introducing Andrea Santiago As a Saleem Sadler patient, I wanted to make you aware of our electronic visit tool called Andrea Santiago. Hortau 24/7 allows you to connect within minutes with a medical provider 24 hours a day, seven days a week via a mobile device or tablet or logging into a secure website from your computer. You can access Andrea Woodsfin from anywhere in the [place]. A virtual visit might be right for you when you have a simple condition and feel like you just dont want to get out of bed, or cant get away from work for an appointment, when your regular Mongenikhil Sadler provider is not available (evenings, weekends or holidays), or when youre out of town and need minor care. Electronic visits cost only $49 and if the Saleem Sadler 24/7 provider determines a prescription is needed to treat your condition, one can be electronically transmitted to a nearby pharmacy*. Please take a moment to enroll today if you have not already done so. The enrollment process is free and takes just a few minutes. To enroll, please download the Hortau 24/7 cindy to your tablet or phone, or visit [email or web address] to enroll on your computer. And, as an [address] [place] [address] patient with a Meliuz account, the results of your visits will be scanned into your electronic medical record and your primary care provider will be able to view the scanned results. We urge you to continue to see your regular Mongenikhil Sadler provider for your ongoing medical care. And while your primary care provider may not be the one available when you seek a Andrea Santiago virtual visit, the peace of mind you get from getting a real diagnosis real time can be priceless. For more information on Andrea Colungaclaudiofin, view our Frequently Asked Questions (FAQs) at [email or web address]. Sincerely, 
 
Jose Gold MD 
Chief Medical Officer Justo8 Tyra Kruse *:  certain medications cannot be prescribed via Andrea Santiago Providers Seen During Your Hospitalization Provider Specialty Primary office phone Viky Salvador MD Gastroenterology [phone] Your Primary Care Physician (PCP) Primary Care Physician Office Phone Office Fax Rigo Oliveros [address], [place] [phone] You are allergic to the following Allergen Reactions Other Food Other (comments) AVOIDS OTHER SPICES IN GENERAL DUE SEVERE REACTIONS TO OREGANO AND MUSTARD Mustard Anaphylaxis THROAT CLOSES Oregano Anaphylaxis THROAT CLOSES Zithromax (Azithromycin) Anaphylaxis Pcn (Penicillins) Unable to Obtain Watermelon Swelling Lips swelling Recent Documentation Height Weight BMI Smoking Status 1.778 m 73.9 kg 23.39 kg/m2 Never Smoker Emergency Contacts Name Discharge Info Relation Home Work Mobile Sagar Luo CAREGIVER [3] Spouse [3] [phone] Patient Belongings The following personal items are in your possession at time of discharge: 
  Dental Appliances: None  Visual Aid: Glasses, At bedside, With patient   Hearing Aids/Status: Bilateral, Functioning Please provide this summary of care documentation to your next provider. Signatures-by signing, you are acknowledging that this After Visit Summary has been reviewed with you and you have received a copy. Patient Signature:  ____________________________________________________________ Date:  ____________________________________________________________  
  
Inga Fuentes Provider Signature:  ____________________________________________________________ Date:  ____________________________________________________________

## 2018-05-14 NOTE — PROCEDURES
[address]  [address] [facility], [address] [place] [address]        Esophago- Gastroduodenoscopy (EGD) Procedure Note    Rozina Louie  3/17/1931  [id number]      Procedure: Endoscopic Gastroduodenoscopy with esophageal dilation    Indication:  Dysphagia/odynophagia s/p large paraesophageal hernia repair by Dr. Corey Nageotte     Pre-operative Diagnosis: see indication above    Post-operative Diagnosis: see findings below    : Giorgio Morgan MD    Referring Provider:  Zafar Curran MD      Anesthesia/Sedation:  MAC anesthesia Propofol        Procedure Details     After infomed consent was obtained for the procedure, with all risks and benefits of procedure explained the patient was taken to the endoscopy suite and placed in the left lateral decubitus position. Following sequential administration of sedation as per above, the endoscope was inserted into the mouth and advanced under direct vision to third portion of the duodenum. A careful inspection was made as the gastroscope was withdrawn, including a retroflexed view of the proximal stomach; findings and interventions are described below. Findings:   Esophagus:there was grad 3 erosive esophagitis at G-E junction  5 cm hiatal hernia, there was stricture/stenosis in center of that hernia, dilated with CRE balloon ( 15, 16.5, 18 mm) kept at 18 mm for 1 minute  Stomach: normal   There was some undigested food in stomach  Duodenum/jejunum: normal      Therapies:  As above    Specimens: none         EBL: None      Complications:   None; patient tolerated the procedure well. Impression:    -See post-procedure diagnoses.     Recommendations:  -start him on protonix 40 mg/d  -discussed case wiht Dr. Brad Evangelista , he recommended the patient to follow up with him  -f/u in 4 to 6 weeks    Signed By: Giorgio Morgan MD     5/14/2018  11:14 AM

## 2018-05-14 NOTE — DISCHARGE INSTRUCTIONS
[facility]  174 [facility], [address] [place] [address]    EGD DISCHARGE INSTRUCTIONS    Toribio Perez  [id number]  3/17/1931    Discomfort:  Sore throat- throat lozenges or warm salt water gargle  redness at IV site- apply warm compress to area; if redness or soreness persist- contact your physician  Gaseous discomfort- walking, belching will help relieve any discomfort  You may not operate a vehicle for 12 hours  You may not engage in an occupation involving machinery or appliances for rest of today  You may not drink alcoholic beverages for at least 12 hours  Avoid making any critical decisions for at least 24 hour  DIET  You may resume your regular diet - however -  remember your colon is empty and a heavy meal will produce gas. Avoid these foods:  vegetables, fried / greasy foods, carbonated drinks    ACTIVITY  You may resume your normal daily activities   Spend the remainder of the day resting -  avoid any strenuous activity. CALL M.D. ANY SIGN OF   Increasing pain, nausea, vomiting  Abdominal distension (swelling)  New increased bleeding (oral or rectal)  Fever (chills)  Pain in chest area  Bloody discharge from nose or mouth  Shortness of breath    Follow-up Instructions:   Call Dr. Leilani Thomas for any questions or problems and follow up with him in 4 to 6 weeks  Follow up with your surgeon, Dr. Sharif Nicholas in 1 month  Telephone # [phone]    ENDOSCOPY FINDINGS:   Your endoscopy showed hiatal hernia, stricture, I dilated and esophagitis ( inflammation in your esophagus). Signed By: Leilani Thomas MD     5/14/2018  11:25 AM          Hiatal Hernia: Care Instructions  Your Care Instructions  A hiatal hernia occurs when part of the stomach bulges into the chest cavity. A hiatal hernia may allow stomach acid and juices to back up into the esophagus (acid reflux). This can cause a feeling of burning, warmth, heat, or pain behind the breastbone.  This feeling may often occur after you eat, soon after you lie down, or when you bend forward, and it may come and go. You also may have a sour taste in your mouth. These symptoms are commonly known as heartburn or reflux. But not all hiatal hernias cause symptoms. Follow-up care is a key part of your treatment and safety. Be sure to make and go to all appointments, and call your doctor if you are having problems. It's also a good idea to know your test results and keep a list of the medicines you take. How can you care for yourself at home? · Take your medicines exactly as prescribed. Call your doctor if you think you are having a problem with your medicine. · Do not take aspirin or other nonsteroidal anti-inflammatory drugs (NSAIDs), such as ibuprofen (Advil, Motrin) or naproxen (Aleve), unless your doctor says it is okay. Ask your doctor what you can take for pain. · Your doctor may recommend over-the-counter medicine. For mild or occasional indigestion, antacids such as Tums, Gaviscon, Maalox, or Mylanta may help. Your doctor also may recommend over-the-counter acid reducers, such as famotidine (Pepcid AC), cimetidine (Tagamet HB), ranitidine (Zantac 75 and Zantac 150), or omeprazole (Prilosec). Read and follow all instructions on the label. If you use these medicines often, talk with your doctor. · Change your eating habits. ¨ It's best to eat several small meals instead of two or three large meals. ¨ After you eat, wait 2 to 3 hours before you lie down. Late-night snacks aren't a good idea. ¨ Chocolate, mint, and alcohol can make heartburn worse. They relax the valve between the esophagus and the stomach. ¨ Spicy foods, foods that have a lot of acid (like tomatoes and oranges), and coffee can make heartburn symptoms worse in some people. If your symptoms are worse after you eat a certain food, you may want to stop eating that food to see if your symptoms get better.   · Do not smoke or chew tobacco.  · If you get heartburn at night, raise the head of your bed 6 to 8 inches by putting the frame on blocks or placing a foam wedge under the head of your mattress. (Adding extra pillows does not work.)  · Do not wear tight clothing around your middle. · Lose weight if you need to. Losing just 5 to 10 pounds can help. When should you call for help? Call your doctor now or seek immediate medical care if:  ? · You have new or worse belly pain. ? · You are vomiting. ? Watch closely for changes in your health, and be sure to contact your doctor if:  ? · You have new or worse symptoms of indigestion. ? · You have trouble or pain swallowing. ? · You are losing weight. ? · You do not get better as expected. Where can you learn more? Go to http://spring-aamir.info/. Enter X510 in the search box to learn more about \"Hiatal Hernia: Care Instructions. \"  Current as of: May 12, 2017  Content Version: 11.4  © 0799-0821 [employer]. Care instructions adapted under license by [employer] (which disclaims liability or warranty for this information). If you have questions about a medical condition or this instruction, always ask your healthcare professional. [place] [address] any warranty or liability for your use of this information. Esophagitis: Care Instructions  Your Care Instructions    Esophagitis (say \"ih-sof-uh-JY-tus\") is irritation of the esophagus, the tube that carries food from your throat to your stomach. Acid reflux is the most common cause of this condition. When you have reflux, stomach acid and juices flow upward. This can cause pain or a burning feeling in your chest. You may have a sore throat. It may be hard to swallow. Other causes of this condition include some medicines and supplements. Allergies or an infection can also cause it. Your doctor will ask about your symptoms and past health.  He or she might do tests to find the cause of your symptoms. Treatment depends on what is causing the problem. Treatment might include changing your diet or taking medicine to relieve your symptoms. It might also include changing a medicine that is causing your symptoms. If you have reflux, medicine that reduces the stomach acid helps your body heal. It might take 1 to 3 weeks to heal.  Follow-up care is a key part of your treatment and safety. Be sure to make and go to all appointments, and call your doctor if you are having problems. It's also a good idea to know your test results and keep a list of the medicines you take. How can you care for yourself at home? · If you have acid reflux, your doctor may recommend that you:  ¨ Eat several small meals instead of two or three large meals. After you eat, wait 2 to 3 hours before you lie down. ¨ Avoid chocolate, mint, alcohol, and spicy foods. ¨ Don't smoke or use smokeless tobacco. Smoking can make this condition worse. If you need help quitting, talk to your doctor about stop-smoking programs and medicines. These can increase your chances of quitting for good. ¨ Raise the head of your bed 6 to 8 inches if you have symptoms at night. ¨ Lose weight if you are overweight. ¨ Take an over-the-counter antacid, such as Maalox, Mylanta, or Tums. Be careful when you take over-the-counter antacid medicines. Many of these medicines have aspirin in them. Read the label to make sure that you are not taking more than the recommended dose. Too much aspirin can be harmful. ¨ Take stronger acid reducers. Examples are famotidine (such as Pepcid), omeprazole (such as Prilosec), and ranitidine (such as Zantac). · If your condition is caused by infection, allergy, or other problems, use the medicine or treatments that your doctor recommends. · Be safe with medicines. Take your medicines exactly as prescribed. Call your doctor if you think you are having a problem with your medicine. When should you call for help?   Call your doctor now or seek immediate medical care if:  ? · You have new or worse belly pain. ? · You are vomiting. ? Watch closely for changes in your health, and be sure to contact your doctor if:  ? · You have new or worse symptoms of reflux. ? · You have trouble or pain swallowing. ? · You are losing weight. ? · You do not get better as expected. Where can you learn more? Go to http://spring-aamir.info/. Enter W747 in the search box to learn more about \"Esophagitis: Care Instructions. \"  Current as of: May 12, 2017  Content Version: 11.4  © 7383-1082 [employer]. Care instructions adapted under license by [employer] (which disclaims liability or warranty for this information). If you have questions about a medical condition or this instruction, always ask your healthcare professional. [address] any warranty or liability for your use of this information. Esophageal Dilation: What to Expect at [address] [place] [address]  After you have esophageal dilation, you will stay at the hospital or surgery center for 1 to 2 hours. This will allow the medicine to wear off. You will be able to go home after your doctor or nurse checks to make sure you are not having any problems. This care sheet gives you a general idea about how long it will take for you to recover. But each person recovers at a different pace. Follow the steps below to get better as quickly as possible. How can you care for yourself at home? Activity  ? · Rest as much as you need to after you go home. ? · You should be able to go back to your usual activities the day after the procedure. Diet  ? · Follow your doctor's directions for eating after the procedure. ? · Drink plenty of fluids (unless your doctor has told you not to). Medicines  ? · Your doctor will tell you if and when you can restart your medicines.  He or she will also give you instructions about taking any new medicines. ? · If you take blood thinners, such as warfarin (Coumadin), clopidogrel (Plavix), or aspirin, be sure to talk to your doctor. He or she will tell you if and when to start taking those medicines again. Make sure that you understand exactly what your doctor wants you to do. ? · If you have a sore throat the day after the procedure, use an over-the-counter spray to numb your throat. Sucking on throat lozenges and gargling with warm salt water may also help relieve your symptoms. Follow-up care is a key part of your treatment and safety. Be sure to make and go to all appointments, and call your doctor if you are having problems. It's also a good idea to know your test results and keep a list of the medicines you take. When should you call for help? Call 911 anytime you think you may need emergency care. For example, call if:  ? · You passed out (lost consciousness). ? · You have trouble breathing. ? · Your stools are maroon or very bloody   ? Call your doctor now or seek immediate medical care if:  ? · You have new or worse belly pain. ? · You have a fever. ? · You have new or more blood in your stools. ? · You are sick to your stomach and cannot drink fluids. ? · You cannot pass stools or gas. ? · You have pain that does not get better after you take pain medicine. ? Watch closely for changes in your health, and be sure to contact your doctor if:  ? · Your throat still hurts after a day or two. ? · You do not get better as expected. Where can you learn more? Go to http://spring-aamir.info/. Enter J937 in the search box to learn more about \"Esophageal Dilation: What to Expect at Home. \"  Current as of: May 12, 2017  Content Version: 11.4  © 4785-8945 [employer]. Care instructions adapted under license by [employer] (which disclaims liability or warranty for this information).  If you have questions about a medical condition or this instruction, always ask your healthcare professional. [place] [address] any warranty or liability for your use of this information.

## 2018-05-14 NOTE — ANESTHESIA POSTPROCEDURE EVALUATION
Post-Anesthesia Evaluation and Assessment    Patient: Rene Edwards MRN: [id number]  SSN: [id number]    YOB: 1931  Age: 80 y.o. Sex: male       Cardiovascular Function/Vital Signs  Visit Vitals    /69    Pulse (!) 47    Temp 36.5 °C (97.7 °F)    Resp 18    Ht 5' 10\" (1.778 m)    Wt 73.9 kg (163 lb)    SpO2 98%    BMI 23.39 kg/m2       Patient is status post MAC anesthesia for Procedure(s):  ESOPHAGOGASTRODUODENOSCOPY (EGD)  ESOPHAGEAL DILATION. Nausea/Vomiting: None    Postoperative hydration reviewed and adequate. Pain:  Pain Scale 1: Visual (05/14/18 1120)  Pain Intensity 1: 0 (05/14/18 1120)   Managed    Neurological Status: At baseline    Mental Status and Level of Consciousness: Arousable    Pulmonary Status:   O2 Device: Room air (05/14/18 1120)   Adequate oxygenation and airway patent    Complications related to anesthesia: None    Post-anesthesia assessment completed.  No concerns    Signed By: Nicki Berry DO     May 14, 2018

## 2018-05-14 NOTE — H&P
Mikejessica Guardadoford  2018 12:25 PM  Location: [facility]  Patient #: [id number]  : 3/17/1931   / Language: [place] / Race: White  Male      History of Present Illness Valeri Pinto AGACN; 2018 3:59 PM)  The patient is a 80year old male who presents with a complaint of difficulty swallowing. The patient presents for due to reoccurrence of symptoms. The onset of the difficulty swallowing has been gradual and has been occurring for 2 years. The course has been severe. The difficulty swallowing is described as being located in the neck and throat. The symptoms are aggravated by solids only and  are relieved by  regurgitation and repeated swallowing. The symptoms have been associated with hoarseness following dysphagia, loss of appetite and weight loss, while the symptoms have not been associated with abdominal pain, heartburn or odynophagia. The patient has been using other medication (pepto bismol, tums, and gas-x). The difficulty swallowing is characterized as worse. The patient had an EGD 2 year(s) ago. Previous diagnostic tests have included Barium swallow. Previous surgery : other (hiatal hernia repair). The patient has lost pounds. The patient has poor nutrition. Note for \"Difficulty swallowing \": He took speech therapy last  and was discharged last November with a normal barium swallow. He admits to not keeping up with exercises. Problem List/Past Medical (Demetris Menon; 2018 12:25 PM)  Hypercholesterolemia    Chronic lymphatic leukemia (204.10  C91.90)    Hypertension    Asthma    Hearing aid worn (V45.89  Z97.4)    Dysphagia (787.20  R13.10)    Hiatal hernia (553.3  K44.9)    Diarrhea (787.91  R19.7)    HEARTBURN (787.1  R12)      Past Surgical History (Demetris Menon; 2018 12:25 PM)  Shoulder Surgery    Tonsillectomy    Skin cancer of face (173.31  C44.310)    Hernia Repair      Allergies (Demetris Menon; 2018 12:25 PM)  No Known Drug Allergies  [03/12/2015]:  No Known Allergies  [03/12/2015]: Medication History (Demetris Calderon; 5/11/2018 12:29 PM)  AmLODIPine Besylate  (10MG Tablet, Oral daily) Active. Pravastatin Sodium  (20MG Tablet, Oral daily) Active. Hydrochlorothiazide  (25MG Tablet, Oral daily) Active. Levothyroxine Sodium  (112MCG Tablet, Oral daily) Active. Aspirin  (81MG Tablet, Oral daily) Active. Medications Reconciled     Family History (Demetris Calderon; 5/11/2018 12:25 PM)  Heart Disease   Mother. Parkinson's Disease   Father. Social History (Demetris Calderon; 5/11/2018 12:25 PM)  Blood Transfusion   Yes. Marital status   . Tobacco Use   Never smoker. Alcohol Use   Occasional alcohol use. Employment status   Retired. Diagnostic Studies History (Demetris Calderon; 5/11/2018 12:25 PM)  Colonoscopy      Health Maintenance History (Demetris Calderon; 5/11/2018 12:25 PM)  Flu Vaccine   Date: 10/1/2014. Pneumovax  [2016]:        Review of Systems (Demetris Calderon; 5/11/2018 12:29 PM)  General Not Present- Chronic Fatigue, Poor Appetite, Weight Gain and Weight Loss. Skin Not Present- Itching, Rash and Skin Color Changes. HEENT Not Present- Hearing Loss and Vertigo. Respiratory Not Present- Difficulty Breathing and TB exposure. Cardiovascular Not Present- Chest Pain, Use of Antibiotics before Dental Procedures and Use of Blood Thinners. Gastrointestinal Present- See HPI. Musculoskeletal Not Present- Arthritis, Hip Replacement Surgery and Knee Replacement Surgery. Neurological Not Present- Weakness. Psychiatric Not Present- Depression. Endocrine Not Present- Diabetes and Thyroid Problems. Hematology Not Present- Anemia. Vitals (Demetris Calderon; 5/11/2018 12:32 PM)  5/11/2018 12:30 PM  Weight: 166 lb   Height: 70 in   Body Surface Area: 1.93 m²   Body Mass Index: 23.82 kg/m²    Pulse: 80 (Regular)    Resp. : 16 (Unlabored)     BP: 116/70 (Sitting, Left Arm, Standard)              Physical Exam Elinor Pinto AGACNP; 5/11/2018 3:59 PM)  General  Mental Status - Alert. General Appearance - Cooperative, Pleasant, Not in acute distress. Orientation - Oriented X3. [address], Well developed and Frail appearing. Integumentary  General Characteristics  Color - normal coloration of skin. Temperature - normal warmth is noted. Mobility & Turgor - normal mobility and turgor. Head and Neck  Head - normocephalic, atraumatic with no lesions or palpable masses. Neck  Global Assessment - full range of motion and supple. Trachea - midline. Eye  Eyeball - Bilateral - No Exophthalmos. Sclera/Conjunctiva - Left - No Jaundice. Sclera/Conjunctiva - Right - No Jaundice. Chest and Lung Exam  Chest and lung exam reveals  - quiet, even and easy respiratory effort with no use of accessory muscles. Auscultation  Breath sounds - Normal. Adventitious sounds - No Adventitious sounds. Cardiovascular  Auscultation  Rhythm - Regular, No Tachycardia, No Bradycardia . Heart Sounds - Normal heart sounds , S1 WNL and S2 WNL, No S3, No Summation Gallop. Murmurs & Other Heart Sounds - Auscultation of the heart reveals - No Murmurs. Abdomen  Inspection  Inspection of the abdomen reveals - Soft and Obese. Palpation/Percussion  Tenderness - Non-Tender. Rebound tenderness - No rebound. Rigidity (guarding) - No Rigidity. Dullness to percussion - No abnormal dullness to percussion. Liver - No hepatosplenomegaly. Abdominal Mass Palpable - No masses. Other Characteristics - No Ascites. Auscultation  Auscultation of the abdomen reveals - Bowel sounds normal, No Abdominal bruits and No Succussion splash. Rectal - Did not examine. Peripheral Vascular  Upper Extremity  Inspection - Left - Normal - No Clubbing, No Cyanosis, No Edema, Pulses Intact. Right - Normal - No Clubbing, No Cyanosis, No Edema, Pulses Intact. Palpation - Edema - Left - No edema.  Right - No edema. Neurologic  Neurologic evaluation reveals  - Cranial nerves grossly intact, no focal neurologic deficits. Musculoskeletal  Global Assessment  Gait and Station - normal gait and station. Assessment & Plan ([facility]; 5/11/2018 4:01 PM)  Dysphagia (787.20  R13.10)  Story: 4/2016 EGD 12 cm hiatal hernia twisted with gastric outlet obstruction  hiatal hernia repaired by Dr. Indra Proctor 2016    Barium Esophagram: difficulty swallowing tablet with some aspiration of water; small fixed hiatal hernia, tablet did no pass form the hiatal hernia into the infradiaphragmatic stomach even in the upright position; marked presbyesophagus; small wide mouth Zenker's diverticulum  Impression: He has been have progressive dysphagia over the past few months. He has some weight loss. Plan for EGD with dilation. Current Plans  Pt Education - How to access health information online: discussed with patient and provided information. Endoscopy (12752) (Discussed risks and benefits with the patient to include: perforation,or post biopsy bleeding, missed lesions, and sedation reactions.)  Patient is to call me for any questions or concerns. Case discussed personally with a physician in the office regarding the presentation, pertinent physical findings and development of a diagnostic and therapeutic plan. Follow up office visit after procedure  This patient was reviewed and seen in collaboration with Dr. Damion Mariano. Weight loss (783.21  R63.4)  Date of Surgery Update:  Mike Anglin was seen and examined. History and physical has been reviewed. The patient has been examined.  There have been no significant clinical changes since the completion of the originally dated History and Physical.    Signed By: Damion Mariano MD     May 14, 2018 10:54 AM

## 2018-05-14 NOTE — ROUTINE PROCESS
Mike Anglin  3/17/1931  [id number]    Situation:  Verbal report received from: Mayela Horvath RN  Procedure: Procedure(s):  ESOPHAGOGASTRODUODENOSCOPY (EGD)  ESOPHAGEAL DILATION    Background:    Preoperative diagnosis: Dysphagia  Postoperative diagnosis: Esophagitis  Hiatal Hernia  Stricture    :  Dr. Jennifer Pichardo  Assistant(s): Endoscopy Technician-1: Susanna Emmanuel IV  Endoscopy RN-1: Heydi Hathaway RN    Specimens: * No specimens in log *  H. Pylori  no    Assessment:  Intra-procedure medications     Anesthesia gave intra-procedure sedation and medications, see anesthesia flow sheet yes    Intravenous fluids: NS@ KVO     Vital signs stable     Abdominal assessment: round and soft     Recommendation:  Discharge patient per MD order.     Family or Friend   Permission to share finding with family or friend yes

## 2018-06-08 ENCOUNTER — OFFICE VISIT (OUTPATIENT)
Dept: INTERNAL MEDICINE CLINIC | Age: 83
End: 2018-06-08

## 2018-06-08 VITALS
OXYGEN SATURATION: 96 % | DIASTOLIC BLOOD PRESSURE: 68 MMHG | WEIGHT: 167.2 LBS | SYSTOLIC BLOOD PRESSURE: 104 MMHG | BODY MASS INDEX: 23.94 KG/M2 | HEART RATE: 83 BPM | TEMPERATURE: 98.3 F | HEIGHT: 70 IN

## 2018-06-08 DIAGNOSIS — I10 ESSENTIAL HYPERTENSION: ICD-10-CM

## 2018-06-08 DIAGNOSIS — Z00.00 MEDICARE ANNUAL WELLNESS VISIT, SUBSEQUENT: Primary | ICD-10-CM

## 2018-06-08 DIAGNOSIS — Z95.828 S/P IVC FILTER: ICD-10-CM

## 2018-06-08 DIAGNOSIS — Z23 ENCOUNTER FOR IMMUNIZATION: ICD-10-CM

## 2018-06-08 DIAGNOSIS — E03.4 HYPOTHYROIDISM DUE TO ACQUIRED ATROPHY OF THYROID: ICD-10-CM

## 2018-06-08 DIAGNOSIS — E78.00 HYPERCHOLESTEROLEMIA: ICD-10-CM

## 2018-06-08 NOTE — PROGRESS NOTES
Chief Complaint   Patient presents with   Blanca Gage Annual Wellness Visit     Health Maintenance Due   Topic    ZOSTER VACCINE AGE 60>     GLAUCOMA SCREENING Q2Y      · Shingrix order placed. · ROR signed for glaucoma exam records.

## 2018-06-08 NOTE — MR AVS SNAPSHOT
[address] 
[phone] Patient: Cl Mcgrath MRN: [id number]:[id number] Visit Information Date & Time Provider Department Dept. Phone Encounter #  
 6/8/2018 10:30 AM Lidia Flores MD [facility] [phone] [id number] Your Appointments 6/13/2018  9:20 AM  
New Patient with MD Betsey Pittman 137  [facility] Appt Note: NP evaluation of hernia [address] [facility] [address]  
[address] [place] [address] Upcoming Health Maintenance Date Due ZOSTER VACCINE AGE 60> 1/17/1991 GLAUCOMA SCREENING Q2Y 12/16/2017 Influenza Age 5 to Adult 8/1/2018 MEDICARE YEARLY EXAM 6/9/2019 DTaP/Tdap/Td series (2 - Td) 6/14/2026 Allergies as of 6/8/2018  Review Complete On: 6/8/2018 By: Lidia Flores MD  
  
 Severity Noted Reaction Type Reactions Other Food  06/20/2014    Other (comments) AVOIDS OTHER SPICES IN GENERAL DUE SEVERE REACTIONS TO OREGANO AND MUSTARD Mustard High 06/20/2014    Anaphylaxis THROAT CLOSES Oregano High 06/20/2014    Anaphylaxis THROAT CLOSES Zithromax [Azithromycin] High 03/30/2011    Anaphylaxis Pcn [Penicillins]  03/30/2011    Unable to Obtain Watermelon  05/14/2018    Swelling Lips swelling Current Immunizations  Reviewed on 6/8/2018 Name Date Influenza High Dose Vaccine PF 10/16/2017, 10/26/2016 Influenza Vaccine 10/15/2015, 10/22/2014, 10/1/2013 Pneumococcal Conjugate (PCV-13) 5/18/2015 Pneumococcal Vaccine (Unspecified Type) 10/1/2011 Tdap 6/14/2016 Reviewed by Lidia Flores MD on 6/8/2018 at 11:07 AM  
You Were Diagnosed With   
  
 Codes Comments Medicare annual wellness visit, subsequent    -  Primary ICD-10-CM: Z00.00 ICD-9-CM: V70.0 Encounter for immunization     ICD-10-CM: C20 ICD-9-CM: V03.89 Hypothyroidism due to acquired atrophy of thyroid     ICD-10-CM: E03.4 ICD-9-CM: 244.8, 246.8 Hypercholesterolemia     ICD-10-CM: E78.00 ICD-9-CM: 272.0 Essential hypertension     ICD-10-CM: I10 
ICD-9-CM: 401.9 S/P IVC filter     ICD-10-CM: A61.747 ICD-9-CM: V45.89 Vitals BP Pulse Temp Height(growth percentile) Weight(growth percentile) SpO2  
 104/68 83 98.3 °F (36.8 °C) (Oral) 5' 10\" (1.778 m) 167 lb 3.2 oz (75.8 kg) 96% BMI Smoking Status 23.99 kg/m2 Never Smoker Vitals History BMI and BSA Data Body Mass Index Body Surface Area  
 23.99 kg/m 2 1.93 m 2 Preferred Pharmacy Pharmacy Name Phone [address] [phone] Your Updated Medication List  
  
   
This list is accurate as of 6/8/18 11:20 AM.  Always use your most recent med list.  
  
  
  
  
 albuterol 90 mcg/actuation inhaler Commonly known as:  PROVENTIL HFA, VENTOLIN HFA, PROAIR HFA Take 2 Puffs by inhalation every six (6) hours as needed for Wheezing. amLODIPine 10 mg tablet Commonly known as:  Baltazar Inks TAKE ONE TABLET BY MOUTH ONCE DAILY  
  
 aspirin delayed-release 325 mg tablet Take 1 Tab by mouth daily. bismuth subsalicylate 566 SW/27 mL Susp Commonly known as:  PEPTO-BISMOL MAXIMUM STRENGTH Take 30 mL by mouth every six (6) hours as needed. calcium carbonate 200 mg calcium (500 mg) Chew Commonly known as:  TUMS Take 1 Tab by mouth as needed (heartburn). co-enzyme Q-10 100 mg capsule Commonly known as:  CO Q-10 Take 100 mg by mouth daily. hydroCHLOROthiazide 25 mg tablet Commonly known as:  HYDRODIURIL Take 1 Tab by mouth daily. levothyroxine 150 mcg tablet Commonly known as:  SYNTHROID  
TAKE ONE TABLET BY MOUTH DAILY BEFORE BREAKFAST. DOSE ADJUSTMENT LOMOTIL 2.5-0.025 mg per tablet Generic drug:  diphenoxylate-atropine Take  by mouth four (4) times daily as needed for Diarrhea. MEN'S MULTI-VITAMIN PO Take 2 each by mouth daily. OTHER Hemp Oil 4 drops per day. OTHER \"alteril\" contains valerian, per pt's wife  
  
 pravastatin 20 mg tablet Commonly known as:  PRAVACHOL Take 1 Tab by mouth nightly. varicella-zoster recombinant (PF) 50 mcg/0.5 mL Susr injection Commonly known as:  SHINGRIX (PF)  
0.5 mL by IntraMUSCular route once for 1 dose. VITAMIN B-12 1,000 mcg sublingual tablet Generic drug:  cyanocobalamin Take 1,000 mcg by mouth daily. VITAMIN D3 2,000 unit Tab Generic drug:  cholecalciferol (vitamin D3) Take 1 tablet by mouth daily. Prescriptions Printed Refills  
 varicella-zoster recombinant, PF, (SHINGRIX, PF,) 50 mcg/0.5 mL susr injection 1 Si.5 mL by IntraMUSCular route once for 1 dose. Class: Print Route: IntraMUSCular We Performed the Following CBC WITH AUTOMATED DIFF [57190 CPT(R)] LIPID PANEL [21864 CPT(R)] METABOLIC PANEL, COMPREHENSIVE [41061 CPT(R)] TSH RFX ON ABNORMAL TO FREE T4 [[id number] Custom] UA/M W/RFLX CULTURE, ROUTINE [[id number] Custom] Patient Instructions Medicare Wellness Visit, Male The best way to live healthy is to have a lifestyle where you eat a well-balanced diet, exercise regularly, limit alcohol use, and quit all forms of tobacco/nicotine, if applicable. Regular preventive services are another way to keep healthy. Preventive services (vaccines, screening tests, monitoring & exams) can help personalize your care plan, which helps you manage your own care. Screening tests can find health problems at the earliest stages, when they are easiest to treat.  
  
[facility] follows the current, evidence-based guidelines published by the [facility] (USPSTF) when recommending preventive services for our patients. Because we follow these guidelines, sometimes recommendations change over time as research supports it. (For example, a prostate screening blood test is no longer routinely recommended for men with no symptoms.) Of course, you and your provider may decide to screen more often for some diseases, based on your risk and co-morbidities (chronic disease you are already diagnosed with). Preventive services for you include: - Medicare offers their members a free annual wellness visit, which is time for you and your primary care provider to discuss and plan for your preventive service needs. Take advantage of this benefit every year! 
 
-All people over age 72 should receive the recommended pneumonia vaccines. Current USPSTF guidelines recommend a series of two vaccines for the best pneumonia protection.  
 
-All adults should have a yearly flu vaccine and a tetanus vaccine every 10 years. All adults age 61 years should receive a shingles vaccine once in their lifetime.   
 
-All adults age 38-68 years who are overweight should have a diabetes screening test once every three years.  
 
-Other screening tests & preventive services for persons with diabetes include: an eye exam to screen for diabetic retinopathy, a kidney function test, a foot exam, and stricter control over your cholesterol.  
 
-Cardiovascular screening for adults with routine risk involves an electrocardiogram (ECG) at intervals determined by the provider.  
 
-Colorectal cancer screenings should be done for adults age 54-65 years with normal risk. There are a number of acceptable methods of screening for this type of cancer. Each test has its own benefits and drawbacks. Discuss with your provider what is most appropriate for you during your annual wellness visit.  The different tests include: colonoscopy (considered the best screening method), a fecal occult blood test, a fecal DNA test, and sigmoidoscopy. 
 
-All adults born between [facility] should be screened once for Hepatitis C. 
 
-An Abdominal Aortic Aneurysm (AAA) Screening is recommended for men age 73-68 who has ever smoked in their lifetime. Here is a list of your current Health Maintenance items (your personalized list of preventive services) with a due date: 
Health Maintenance Due Topic Date Due  Shingles Vaccine  01/17/1991  Glaucoma Screening   12/16/2017 Introducing [address]! Dear Mac Francis: Thank you for requesting a [employer] account. Our records indicate that you already have an active [employer] account. You can access your account anytime at [email or web address] Did you know that you can access your hospital and ER discharge instructions at any time in [employer]? You can also review all of your test results from your hospital stay or ER visit. Additional Information If you have questions, please visit the Frequently Asked Questions section of the [employer] website at [email or web address] Remember, [employer] is NOT to be used for urgent needs. For medical emergencies, dial 911. Now available from your iPhone and Android! Please provide this summary of care documentation to your next provider. Your primary care clinician is listed as Alee [phone] If you have any questions after today's visit, please call [phone].

## 2018-06-08 NOTE — PATIENT INSTRUCTIONS
Medicare Wellness Visit, Male    The best way to live healthy is to have a lifestyle where you eat a well-balanced diet, exercise regularly, limit alcohol use, and quit all forms of tobacco/nicotine, if applicable. Regular preventive services are another way to keep healthy. Preventive services (vaccines, screening tests, monitoring & exams) can help personalize your care plan, which helps you manage your own care. Screening tests can find health problems at the earliest stages, when they are easiest to treat. [address] [facility] follows the current, evidence-based guidelines published by the [facility] ([facility]) when recommending preventive services for our patients. Because we follow these guidelines, sometimes recommendations change over time as research supports it. (For example, a prostate screening blood test is no longer routinely recommended for men with no symptoms.)    Of course, you and your provider may decide to screen more often for some diseases, based on your risk and co-morbidities (chronic disease you are already diagnosed with). Preventive services for you include:    - Medicare offers their members a free annual wellness visit, which is time for you and your primary care provider to discuss and plan for your preventive service needs. Take advantage of this benefit every year!    -All people over age 72 should receive the recommended pneumonia vaccines. Current USPSTF guidelines recommend a series of two vaccines for the best pneumonia protection.     -All adults should have a yearly flu vaccine and a tetanus vaccine every 10 years.  All adults age 61 years should receive a shingles vaccine once in their lifetime.      -All adults age 38-68 years who are overweight should have a diabetes screening test once every three years.     -Other screening tests & preventive services for persons with diabetes include: an eye exam to screen for diabetic retinopathy, a kidney function test, a foot exam, and stricter control over your cholesterol.     -Cardiovascular screening for adults with routine risk involves an electrocardiogram (ECG) at intervals determined by the provider.     -Colorectal cancer screenings should be done for adults age 54-65 years with normal risk. There are a number of acceptable methods of screening for this type of cancer. Each test has its own benefits and drawbacks. Discuss with your provider what is most appropriate for you during your annual wellness visit. The different tests include: colonoscopy (considered the best screening method), a fecal occult blood test, a fecal DNA test, and sigmoidoscopy.    -All adults born between [facility] should be screened once for Hepatitis C.    -An Abdominal Aortic Aneurysm (AAA) Screening is recommended for men age 73-68 who has ever smoked in their lifetime.      Here is a list of your current Health Maintenance items (your personalized list of preventive services) with a due date:  Health Maintenance Due   Topic Date Due    Shingles Vaccine  01/17/1991    Glaucoma Screening   12/16/2017

## 2018-06-08 NOTE — PROGRESS NOTES
This is the Subsequent Medicare Annual Wellness Exam, performed 12 months or more after the Initial AWV or the last Subsequent AWV  As well as for follow-up of his health issues. He has been doing well. He has seen the oncologist recently for his CLL and that stable. He also had an upper endoscopy recently and that has significantly improved his swallowing issues. He is not having any choking episodes. No vomiting. His weight is actually up about 3 pounds. He denies headaches or dizziness. No nosebleeds. No chest pains or shortness of breath. No cough or wheeze. No change in bowel or bladder habits. I have reviewed the patient's medical history in detail and updated the computerized patient record. History     Past Medical History:   Diagnosis Date    Asthma     Autoimmune disease ([facility]     CLL    Cancer ([facility] 6/20/14    MULTIPLE SCCAs EXCISED, ALSO BCCAs    CLL (chronic lymphocytic leukemia) (HCC)     no treatment    GERD (gastroesophageal reflux disease)     Hypercholesterolemia     Hypertension     Liver disease 6/6/2014    ADMITTED FOR LIVER DYSFUNCTION, NOT DIAGNOSED, NOW RESOLVED    Pulmonary embolism ([facility] 9/2015    Thyroid disease     Unspecified adverse effect of anesthesia     \"out of it for 2 weeks\"    Unspecified sleep apnea     had sleep study but no follow up      Past Surgical History:   Procedure Laterality Date    HX ABDOMINAL LAPAROSCOPY  4/2016    HX COLONOSCOPY      X2, MOST RECNT WAS IN 2013    HX HEENT      tonsillectomy    HX HERNIA REPAIR  4/13/16    paraesophageal hernia repair Dr Willie Garcia Left     Frozen shoulder manipulation     Current Outpatient Prescriptions   Medication Sig Dispense Refill    OTHER \"alteril\" contains valerian, per pt's wife      albuterol (PROVENTIL HFA, VENTOLIN HFA, PROAIR HFA) 90 mcg/actuation inhaler Take 2 Puffs by inhalation every six (6) hours as needed for Wheezing.  1 Inhaler 11    levothyroxine (SYNTHROID) 150 mcg tablet TAKE ONE TABLET BY MOUTH DAILY BEFORE BREAKFAST. DOSE ADJUSTMENT 85 Tab 2    hydroCHLOROthiazide (HYDRODIURIL) 25 mg tablet Take 1 Tab by mouth daily. 90 Tab 1    pravastatin (PRAVACHOL) 20 mg tablet Take 1 Tab by mouth nightly. 90 Tab 1    aspirin delayed-release 325 mg tablet Take 1 Tab by mouth daily.  amLODIPine (NORVASC) 10 mg tablet TAKE ONE TABLET BY MOUTH ONCE DAILY 90 Tab 4    bismuth subsalicylate (PEPTO-BISMOL MAXIMUM STRENGTH) 525 mg/15 mL susp Take 30 mL by mouth every six (6) hours as needed.  cyanocobalamin (VITAMIN B-12) 1,000 mcg sublingual tablet Take 1,000 mcg by mouth daily.  diphenoxylate-atropine (LOMOTIL) 2.5-0.025 mg per tablet Take  by mouth four (4) times daily as needed for Diarrhea.  co-enzyme Q-10 (CO Q-10) 100 mg capsule Take 100 mg by mouth daily.  calcium carbonate (TUMS) 200 mg calcium (500 mg) chew Take 1 Tab by mouth as needed (heartburn).  MEN'S MULTI-VITAMIN PO Take 2 each by mouth daily.  cholecalciferol, vitamin D3, (VITAMIN D3) 2,000 unit tab Take 1 tablet by mouth daily.  pantoprazole (PROTONIX) 40 mg tablet Take 1 Tab by mouth daily (with breakfast) for 30 days. 30 Tab 6    OTHER Hemp Oil 4 drops per day.  nystatin (MYCOSTATIN) powder Apply to the left groin 4 times a day 30 g 2    mirtazapine (REMERON) 15 mg tablet Take 1 Tab by mouth nightly. 30 Tab 3    colestipol (COLESTID) 1 gram tablet Take 1 g by mouth daily.  lactobacillus combination no.4 (PROBIOTIC) 3 billion cell cap Take  by mouth. Ultimate Melanie Probiotic 30 billion cx per cap - 1 pill per day.  raNITIdine (ZANTAC 75) 75 mg tablet Take 150 mg by mouth nightly.        Allergies   Allergen Reactions    Other Food Other (comments)     AVOIDS OTHER SPICES IN GENERAL DUE SEVERE REACTIONS TO OREGANO AND MUSTARD    Mustard Anaphylaxis     THROAT CLOSES    Oregano Anaphylaxis     THROAT CLOSES    Zithromax [Azithromycin] Anaphylaxis    Pcn [Penicillins] Unable to Obtain    Watermelon Swelling     Lips swelling     Family History   Problem Relation Age of Onset    Heart Disease Mother     Heart Failure Mother     Heart Disease Father     Cancer Sister      Lung CA - SMOKER    Heart Disease Sister     Anesth Problems Neg Hx      Social History   Substance Use Topics    Smoking status: Never Smoker    Smokeless tobacco: Never Used    Alcohol use 2.5 oz/week     5 Standard drinks or equivalent per week      Comment: occasional     Patient Active Problem List   Diagnosis Code    CLL (chronic lymphocytic leukemia) ([facility] C91.90    Hypercholesterolemia E78.00    Hypertension I10    Hypothyroidism E03.9    Pulmonary embolism (HCC) I26.99    GI bleed K92.2    S/P IVC filter Z95.828    Advanced care planning/counseling discussion Z71.89   ROS - Per HPI. Just saw the derm MD.  Physical Examination: General appearance - alert, well appearing, and in no distress  Ears - bilateral TM's and external ear canals normal  Nose - normal and patent, no erythema, discharge or polyps  Mouth - mucous membranes moist, pharynx normal without lesions  Neck - supple, no significant adenopathy  Lymphatics - no palpable lymphadenopathy, no hepatosplenomegaly  Chest - clear to auscultation, no wheezes, rales or rhonchi, symmetric air entry  Heart - normal rate and regular rhythm  Abdomen - soft, nontender, nondistended, no masses or organomegaly  Neurological - alert, oriented, normal speech, no focal findings or movement disorder noted  Musculoskeletal - no joint tenderness, deformity or swelling  Extremities - peripheral pulses normal, no pedal edema, no clubbing or cyanosis      Depression Risk Factor Screening:     PHQ over the last two weeks 11/10/2017   Little interest or pleasure in doing things Not at all   Feeling down, depressed or hopeless Not at all   Total Score PHQ 2 0     Alcohol Risk Factor Screening:    You do not drink alcohol or very rarely. Functional Ability and Level of Safety:   Hearing Loss  Hearing is good. Has hearing aids. Activities of Daily Living  The home contains: no safety equipment. Patient does total self care    Fall Risk  Fall Risk Assessment, last 12 mths 11/10/2017   Able to walk? Yes   Fall in past 12 months? No       Abuse Screen  Patient is not abused    Cognitive Screening   Evaluation of Cognitive Function:  Has your family/caregiver stated any concerns about your memory: no      Patient Care Team   Patient Care Team:  Alex Bernard MD as PCP - General (Internal Medicine)  Silvia Jaramillo MD (Ophthalmology)  Indy Villafana MD (Dermatology)    Assessment/Plan   Education and counseling provided:  Are appropriate based on today's review and evaluation  End-of-Life planning (with patient's consent)  Diabetes screening test  Diagnoses and all orders for this visit:    1. Medicare annual wellness visit, subsequent    2. Encounter for immunization    3. Hypothyroidism due to acquired atrophy of thyroid -appears euthyroid. Will check levels and adjust meds as needed. -     TSH RFX ON ABNORMAL TO FREE T4    4. Hypercholesterolemia -LDL goal of 100. Tolerating pravastatin well. Will continue and check levels  -     CBC WITH AUTOMATED DIFF  -     METABOLIC PANEL, COMPREHENSIVE  -     LIPID PANEL  -     UA/M W/RFLX CULTURE, ROUTINE    5. Essential hypertension -blood pressure well controlled. Continue current meds. -     UA/M W/RFLX CULTURE, ROUTINE    6. S/P IVC filter  7. CLLstable  8. Recurrent hiatal hernia with dysphagiaimproved after upper endoscopy and dilatation. Other orders  -     varicella-zoster recombinant, PF, (SHINGRIX, PF,) 50 mcg/0.5 mL susr injection; 0.5 mL by IntraMUSCular route once for 1 dose.        Follow-up Disposition: 6 months and as needed   Advised him to call back or return to office if symptoms worsen/change/persist.  Discussed expected course/resolution/complications of diagnosis in detail with patient. Medication risks/benefits/costs/interactions/alternatives discussed with patient. He was given an after visit summary which includes diagnoses, current medications, & vitals. He expressed understanding with the diagnosis and plan.            Health Maintenance Due   Topic Date Due    ZOSTER VACCINE AGE 60>  01/17/1991    GLAUCOMA SCREENING Q2Y  12/16/2017

## 2018-06-11 ENCOUNTER — HOSPITAL ENCOUNTER (OUTPATIENT)
Dept: LAB | Age: 83
Discharge: HOME | End: 2018-06-11
Payer: MEDICARE

## 2018-06-11 PROCEDURE — 36415 COLL VENOUS BLD VENIPUNCTURE: CPT

## 2018-06-11 PROCEDURE — 80061 LIPID PANEL: CPT

## 2018-06-11 PROCEDURE — 84443 ASSAY THYROID STIM HORMONE: CPT

## 2018-06-11 PROCEDURE — 85025 COMPLETE CBC W/AUTO DIFF WBC: CPT

## 2018-06-11 PROCEDURE — 81001 URINALYSIS AUTO W/SCOPE: CPT

## 2018-06-11 PROCEDURE — 80053 COMPREHEN METABOLIC PANEL: CPT

## 2018-06-12 LAB
ALBUMIN SERPL-MCNC: 3.9 G/DL (ref 3.5–4.7)
ALBUMIN/GLOB SERPL: 1.7 {RATIO} (ref 1.2–2.2)
ALP SERPL-CCNC: 56 IU/L (ref 39–117)
ALT SERPL-CCNC: 16 IU/L (ref 0–44)
APPEARANCE UR: CLEAR
AST SERPL-CCNC: 26 IU/L (ref 0–40)
BACTERIA #/AREA URNS HPF: NORMAL /[HPF]
BASOPHILS # BLD AUTO: 0 X10E3/UL (ref 0–0.2)
BASOPHILS NFR BLD AUTO: 1 %
BILIRUB SERPL-MCNC: 0.3 MG/DL (ref 0–1.2)
BILIRUB UR QL STRIP: NEGATIVE
BUN SERPL-MCNC: 16 MG/DL (ref 8–27)
BUN/CREAT SERPL: 14 (ref 10–24)
CALCIUM SERPL-MCNC: 9.2 MG/DL (ref 8.6–10.2)
CASTS URNS QL MICRO: NORMAL /LPF
CHLORIDE SERPL-SCNC: 100 MMOL/L (ref 96–106)
CHOLEST SERPL-MCNC: 131 MG/DL (ref 100–199)
CO2 SERPL-SCNC: 27 MMOL/L (ref 20–29)
COLOR UR: YELLOW
CREAT SERPL-MCNC: 1.15 MG/DL (ref 0.76–1.27)
EOSINOPHIL # BLD AUTO: 0.3 X10E3/UL (ref 0–0.4)
EOSINOPHIL NFR BLD AUTO: 7 %
EPI CELLS #/AREA URNS HPF: NORMAL /HPF
ERYTHROCYTE [DISTWIDTH] IN BLOOD BY AUTOMATED COUNT: 13 % (ref 12.3–15.4)
GFR SERPLBLD CREATININE-BSD FMLA CKD-EPI: 57 ML/MIN/1.73
GFR SERPLBLD CREATININE-BSD FMLA CKD-EPI: 66 ML/MIN/1.73
GLOBULIN SER CALC-MCNC: 2.3 G/DL (ref 1.5–4.5)
GLUCOSE SERPL-MCNC: 101 MG/DL (ref 65–99)
GLUCOSE UR QL: NEGATIVE
HCT VFR BLD AUTO: 35.1 % (ref 37.5–51)
HDLC SERPL-MCNC: 56 MG/DL
HGB BLD-MCNC: 11.4 G/DL (ref 13–17.7)
HGB UR QL STRIP: NEGATIVE
IMM GRANULOCYTES # BLD: 0 X10E3/UL (ref 0–0.1)
IMM GRANULOCYTES NFR BLD: 0 %
KETONES UR QL STRIP: NEGATIVE
LDLC SERPL CALC-MCNC: 56 MG/DL (ref 0–99)
LEUKOCYTE ESTERASE UR QL STRIP: NEGATIVE
LYMPHOCYTES # BLD AUTO: 0.8 X10E3/UL (ref 0.7–3.1)
LYMPHOCYTES NFR BLD AUTO: 22 %
MCH RBC QN AUTO: 28.7 PG (ref 26.6–33)
MCHC RBC AUTO-ENTMCNC: 32.5 G/DL (ref 31.5–35.7)
MCV RBC AUTO: 88 FL (ref 79–97)
MICRO URNS: NORMAL
MICRO URNS: NORMAL
MONOCYTES # BLD AUTO: 0.5 X10E3/UL (ref 0.1–0.9)
MONOCYTES NFR BLD AUTO: 14 %
MUCOUS THREADS URNS QL MICRO: PRESENT
NEUTROPHILS # BLD AUTO: 2 X10E3/UL (ref 1.4–7)
NEUTROPHILS NFR BLD AUTO: 56 %
NITRITE UR QL STRIP: NEGATIVE
PH UR STRIP: 6.5 [PH] (ref 5–7.5)
PLATELET # BLD AUTO: 137 X10E3/UL (ref 150–379)
POTASSIUM SERPL-SCNC: 4.4 MMOL/L (ref 3.5–5.2)
PROT SERPL-MCNC: 6.2 G/DL (ref 6–8.5)
PROT UR QL STRIP: NEGATIVE
RBC # BLD AUTO: 3.97 X10E6/UL (ref 4.14–5.8)
RBC #/AREA URNS HPF: NORMAL /HPF
SODIUM SERPL-SCNC: 139 MMOL/L (ref 134–144)
SP GR UR: 1.02 (ref 1–1.03)
TRIGL SERPL-MCNC: 97 MG/DL (ref 0–149)
TSH SERPL DL<=0.005 MIU/L-ACNC: 0.97 UIU/ML (ref 0.45–4.5)
URINALYSIS REFLEX, 377202: NORMAL
UROBILINOGEN UR STRIP-MCNC: 0.2 MG/DL (ref 0.2–1)
VLDLC SERPL CALC-MCNC: 19 MG/DL (ref 5–40)
WBC # BLD AUTO: 3.6 X10E3/UL (ref 3.4–10.8)
WBC #/AREA URNS HPF: NORMAL /HPF

## 2018-06-13 ENCOUNTER — OFFICE VISIT (OUTPATIENT)
Dept: SURGERY | Age: 83
End: 2018-06-13

## 2018-06-13 VITALS
BODY MASS INDEX: 23.91 KG/M2 | SYSTOLIC BLOOD PRESSURE: 118 MMHG | DIASTOLIC BLOOD PRESSURE: 68 MMHG | HEIGHT: 70 IN | RESPIRATION RATE: 18 BRPM | TEMPERATURE: 98.1 F | WEIGHT: 167 LBS | HEART RATE: 68 BPM | OXYGEN SATURATION: 97 %

## 2018-06-13 DIAGNOSIS — R13.19 ESOPHAGEAL DYSPHAGIA: ICD-10-CM

## 2018-06-13 DIAGNOSIS — K22.5 ZENKER'S DIVERTICULUM: ICD-10-CM

## 2018-06-13 DIAGNOSIS — K44.9 HIATAL HERNIA: Primary | ICD-10-CM

## 2018-06-13 NOTE — PROGRESS NOTES
[facility] General Surgery History and Physical    History of Present Illness:      Petrona Guerrero is a 80 y.o. male who has a PSH of laparoscopic hiatal hernia repair with mesh and Toupet fundoplication in 0/5585 by me. He had been doing well until a few months ago where he developed increasing dysphagia. He was worked up with a barium swallow and it showed a recurrent hiatal hernia with a Zenkers diverticulum. He had an EGD done last month by Dr Rasheed Coffman and he dilated the hiatal hernia and seemed to have improved his symptoms some. He still has some issues with some meats and breads. He has no issues swallowing liquids or softer foods. He has some mild reflux as well. He does not have any chest pain or SOB.     Past Medical History:   Diagnosis Date    Asthma     Autoimmune disease ([facility]     CLL    Cancer ([facility] 6/20/14    MULTIPLE SCCAs EXCISED, ALSO BCCAs    CLL (chronic lymphocytic leukemia) (HCC)     no treatment    GERD (gastroesophageal reflux disease)     Hypercholesterolemia     Hypertension     Liver disease 6/6/2014    ADMITTED FOR LIVER DYSFUNCTION, NOT DIAGNOSED, NOW RESOLVED    Pulmonary embolism ([facility] 9/2015    Thyroid disease     Unspecified adverse effect of anesthesia     \"out of it for 2 weeks\"    Unspecified sleep apnea     had sleep study but no follow up       Past Surgical History:   Procedure Laterality Date    HX ABDOMINAL LAPAROSCOPY  4/2016    HX COLONOSCOPY      X2, MOST RECNT WAS IN 2013    HX HEENT      tonsillectomy    HX HERNIA REPAIR  4/13/16    paraesophageal hernia repair Dr Manuel  HX ORTHOPAEDIC Left     Frozen shoulder manipulation         Current Outpatient Prescriptions:     OTHER, \"alteril\" contains valerian, per pt's wife, Disp: , Rfl:     OTHER, Hemp Oil 4 drops per day., Disp: , Rfl:     albuterol (PROVENTIL HFA, VENTOLIN HFA, PROAIR HFA) 90 mcg/actuation inhaler, Take 2 Puffs by inhalation every six (6) hours as needed for Wheezing., Disp: 1 Inhaler, Rfl: 11    levothyroxine (SYNTHROID) 150 mcg tablet, TAKE ONE TABLET BY MOUTH DAILY BEFORE BREAKFAST. DOSE ADJUSTMENT, Disp: 85 Tab, Rfl: 2    hydroCHLOROthiazide (HYDRODIURIL) 25 mg tablet, Take 1 Tab by mouth daily. , Disp: 90 Tab, Rfl: 1    pravastatin (PRAVACHOL) 20 mg tablet, Take 1 Tab by mouth nightly., Disp: 90 Tab, Rfl: 1    aspirin delayed-release 325 mg tablet, Take 1 Tab by mouth daily. , Disp: , Rfl:     amLODIPine (NORVASC) 10 mg tablet, TAKE ONE TABLET BY MOUTH ONCE DAILY, Disp: 90 Tab, Rfl: 4    bismuth subsalicylate (PEPTO-BISMOL MAXIMUM STRENGTH) 525 mg/15 mL susp, Take 30 mL by mouth every six (6) hours as needed. , Disp: , Rfl:     cyanocobalamin (VITAMIN B-12) 1,000 mcg sublingual tablet, Take 1,000 mcg by mouth daily. , Disp: , Rfl:     diphenoxylate-atropine (LOMOTIL) 2.5-0.025 mg per tablet, Take  by mouth four (4) times daily as needed for Diarrhea., Disp: , Rfl:     co-enzyme Q-10 (CO Q-10) 100 mg capsule, Take 100 mg by mouth daily. , Disp: , Rfl:     calcium carbonate (TUMS) 200 mg calcium (500 mg) chew, Take 1 Tab by mouth as needed (heartburn). , Disp: , Rfl:     MEN'S MULTI-VITAMIN PO, Take 2 each by mouth daily. , Disp: , Rfl:     cholecalciferol, vitamin D3, (VITAMIN D3) 2,000 unit tab, Take 1 tablet by mouth daily. , Disp: , Rfl:     Allergies   Allergen Reactions    Other Food Other (comments)     AVOIDS OTHER SPICES IN GENERAL DUE SEVERE REACTIONS TO OREGANO AND MUSTARD    Mustard Anaphylaxis     THROAT CLOSES    Oregano Anaphylaxis     THROAT CLOSES    Zithromax [Azithromycin] Anaphylaxis    Pcn [Penicillins] Unable to Obtain    Watermelon Swelling     Lips swelling       Social History     Social History    Marital status:      Spouse name: N/A    Number of children: N/A    Years of education: N/A     Occupational History    Not on file.      Social History Main Topics    Smoking status: Never Smoker    Smokeless tobacco: Never Used   [employer] Alcohol use 2.5 oz/week     5 Standard drinks or equivalent per week      Comment: occasional    Drug use: No    Sexual activity: Yes     Other Topics Concern    Not on file     Social History Narrative       Family History   Problem Relation Age of Onset    Heart Disease Mother     Heart Failure Mother     Heart Disease Father     Cancer Sister      Lung CA - SMOKER    Heart Disease Sister     Anesth Problems Neg Hx        ROS   Constitutional: negative  Ears, Nose, Mouth, Throat, and Face: negative  Respiratory: negative  Cardiovascular: negative  Gastrointestinal: positive for dysphagia and reflux symptoms  Genitourinary:negative  Integument/Breast: negative  Hematologic/Lymphatic: negative  Behavioral/Psychiatric: negative  Allergic/Immunologic: negative      Physical Exam:     Visit Vitals    /68    Pulse 68    Temp 98.1 °F (36.7 °C)    Resp 18    Ht 5' 10\" (1.778 m)    Wt 167 lb (75.8 kg)    SpO2 97%    BMI 23.96 kg/m2       General - alert and oriented, no apparent distress  HEENT - no jaundice, no hearing imparement  Pulm - CTAB, no C/W/R  CV - RRR, no M/R/G  Abd - soft, ND, BS Present, NTTP, incisions well healed  Ext - pulses intact in UE and LE bilaterally, no edema  Skin - supple, no rashes  Psychiatric - normal affect, good mood    Labs  none    Imaging  Barium swallow - FINDINGS: The preliminary radiograph demonstrates  hyperaeration of the upper  lobes and reticular nodular interstitial densities in the lower lobe bases left  greater than right which have not changed since the previous study. Port-A-Cath  line overlies the thorax with the tip in the position of the superior vena cava. Heart size normal..     A single phase examination was performed. The patient swallowed barium without  difficulty. Rapid sequence images of the cervical esophagus in the frontal and lateral  projections demonstrate a small widemouth Zenker's diverticulum. . There is  marked decreased peristalsis of the esophagus consistent with presbyesophagus. Small hiatal hernia which is fixed is noted. No mucosal abnormality of the esophagus is noted. There is no stricture of the  esophagus, however, a barium tablet would not pass from the hiatal hernia into  the infradiaphragmatic stomach. During attempts to swallow the tablet patient  did aspirate some water.        IMPRESSION  IMPRESSION:    1. Difficulty swallowing tablet with some aspiration of water. 2. Small fixed hiatal hernia. Tablet did not pass from the hiatal hernia into  the infradiaphragmatic stomach even in the upright position. 3. Marked presbyesophagus. 4. Small widemouth Zenker's diverticulum         EGD results:    Procedure: Endoscopic Gastroduodenoscopy with esophageal dilation     Indication:  Dysphagia/odynophagia s/p large paraesophageal hernia repair by Dr. Scooby Lira      Pre-operative Diagnosis: see indication above     Post-operative Diagnosis: see findings below     : Gary Guerrero MD     Referring Provider:  Tiffani Rowe MD        Anesthesia/Sedation:  MAC anesthesia Propofol         Procedure Details      After infomed consent was obtained for the procedure, with all risks and benefits of procedure explained the patient was taken to the endoscopy suite and placed in the left lateral decubitus position. Following sequential administration of sedation as per above, the endoscope was inserted into the mouth and advanced under direct vision to third portion of the duodenum.   A careful inspection was made as the gastroscope was withdrawn, including a retroflexed view of the proximal stomach; findings and interventions are described below.       Findings:   Esophagus:there was grad 3 erosive esophagitis at G-E junction  5 cm hiatal hernia, there was stricture/stenosis in center of that hernia, dilated with CRE balloon ( 15, 16.5, 18 mm) kept at 18 mm for 1 minute  Stomach: normal   There was some undigested food in stomach  Duodenum/jejunum: normal        Therapies:  As above     Specimens: none      EBL: None      Complications:   None; patient tolerated the procedure well.      Impression:    -See post-procedure diagnoses.     Recommendations:  -start him on protonix 40 mg/d  -discussed case wiht Dr. Esther Lambert , he recommended the patient to follow up with him  -f/u in 4 to 6 weeks  I have reviewed and agree with all of the pertinent images    Assessment:     Danielle Bishop is a 80 y.o. male with recurrent hiatal hernia and Zenkers diverticulum    Recommendations:     1. He does have a recurrent hiatal hernia after his previous repair. His recurrence is unexpected but not completely unusual for this disease. BIO-A absorbable mesh was used for the repair but despite this he has a recurrence. He was on treatment for CLL with chemo which can make the hernia more likely to reoccur. He is reluctant to have surgery again. He has had some improvement with the dilation and would consider repeat dilation in the future if he has issues. Recurrent hiatal hernia repair would be possible and is an option if he were inclined. Since he is managing his symptoms well he does not require surgery. He will follow up with me PRN. MD Doron HayJob Vazquez has a reminder for a \"due or due soon\" health maintenance. I have asked that he contact his primary care provider for follow-up on this health maintenance.

## 2018-06-13 NOTE — PROGRESS NOTES
1. Have you been to the ER, urgent care clinic since your last visit? Hospitalized since your last visit?  no    2. Have you seen or consulted any other health care providers outside of the [address] [place] [address] since your last visit? Include any pap smears or colon screening.    no

## 2018-06-13 NOTE — LETTER
6/13/2018 4:03 PM 
 
Patient:  Shanon Smart YOB: 1931 Date of Visit: 6/13/2018 Dear Chuy Hanson MD 
[address] [place] [address] [phone] VIA In Basket 
 : Thank you for referring Mr. Mike Anglin to me for evaluation/treatment. Below are the relevant portions of my assessment and plan of care. If you have questions, please do not hesitate to call me. I look forward to following Mr. Meño Burdick along with you. Sincerely, Farshad Khoury MD

## 2018-08-22 ENCOUNTER — TELEPHONE (OUTPATIENT)
Dept: INTERNAL MEDICINE CLINIC | Age: 83
End: 2018-08-22

## 2018-08-22 NOTE — TELEPHONE ENCOUNTER
Please call patient's wife concerning him losing 4lbs in last four days -- been throwing up and has diarrhea.

## 2018-10-22 DIAGNOSIS — I10 ESSENTIAL HYPERTENSION WITH GOAL BLOOD PRESSURE LESS THAN 140/90: ICD-10-CM

## 2018-10-22 RX ORDER — HYDROCHLOROTHIAZIDE 25 MG/1
TABLET ORAL
Qty: 90 TAB | Refills: 1 | Status: SHIPPED | OUTPATIENT
Start: 2018-10-22 | End: 2019-09-19 | Stop reason: SDUPTHER

## 2018-11-07 ENCOUNTER — TELEPHONE (OUTPATIENT)
Dept: INTERNAL MEDICINE CLINIC | Age: 83
End: 2018-11-07

## 2018-11-07 NOTE — TELEPHONE ENCOUNTER
Patients wife Elizabeth Post calling with concerns on . She states he had \"major\" surgery yesterday @ Dr. Ronald Rivera office upstairs (who she says have been unreachable). When asked what was done she reported he had a 2x2 patch of his scalp removed as well as a skin graft to his shoulder. He was started on doxycycline and his last dose was today @11:30 AM along with Tylenol. The patient had 1 episode of vomiting started 1 hour ago. Patient transferred to phone, he states he feels fine, denies nausea, additional vomiting, diarrhea, or pain. Patient checked him temp on phone and reported 97.0 degrees. Wife back to phone and wants to know if she can stop his doxycycline. I advised that according to his chart it appears he has tolerated Doxycycline in the past with no issues and would not recommend stopping until reviewing w/ MD as he took the medicine 4 hrs prior to episode. Reviewed common side effects of med and instructed of diet/fluids to try/avoid. She would like a call back ASAP after reviewing with MD to let her know if he is concerned and what to do about abx. Advised pts wife that I would forward the information to Dr. Pawel Broderick for review and will return call as soon as we can. Best contact#:  [phone].

## 2018-11-08 NOTE — TELEPHONE ENCOUNTER
Spoke with pt who stated that he is feeling better. No more vomiting and then proceeds to tell me that his wife stopped the abx. I explained that he HAS to take the abx prescribed by Dr. Jeff Maradiaga. They need to contact his office to discuss or an alternative. He verbalized understanding.

## 2018-11-09 DIAGNOSIS — E78.00 HYPERCHOLESTEROLEMIA: ICD-10-CM

## 2018-11-09 RX ORDER — PRAVASTATIN SODIUM 20 MG/1
TABLET ORAL
Qty: 90 TAB | Refills: 1 | Status: SHIPPED | OUTPATIENT
Start: 2018-11-09 | End: 2019-08-06 | Stop reason: SDUPTHER

## 2018-11-28 NOTE — ACP (ADVANCE CARE PLANNING)
Advance Care Planning Patient's Healthcare Decision Maker is: Named in scanned ACP document Confirm Advance Directive Yes, on file  
  
Reviewed the current advance care plan document on file with patient and all information is up to date.

## 2018-11-30 ENCOUNTER — OFFICE VISIT (OUTPATIENT)
Dept: INTERNAL MEDICINE CLINIC | Age: 83
End: 2018-11-30

## 2018-11-30 VITALS
HEART RATE: 68 BPM | WEIGHT: 162 LBS | SYSTOLIC BLOOD PRESSURE: 148 MMHG | TEMPERATURE: 98 F | RESPIRATION RATE: 14 BRPM | BODY MASS INDEX: 23.19 KG/M2 | OXYGEN SATURATION: 98 % | HEIGHT: 70 IN | DIASTOLIC BLOOD PRESSURE: 89 MMHG

## 2018-11-30 DIAGNOSIS — I48.0 PAROXYSMAL ATRIAL FIBRILLATION (HCC): ICD-10-CM

## 2018-11-30 DIAGNOSIS — Z01.818 PREOPERATIVE GENERAL PHYSICAL EXAMINATION: ICD-10-CM

## 2018-11-30 DIAGNOSIS — I10 ESSENTIAL HYPERTENSION: ICD-10-CM

## 2018-11-30 DIAGNOSIS — E03.4 HYPOTHYROIDISM DUE TO ACQUIRED ATROPHY OF THYROID: ICD-10-CM

## 2018-11-30 DIAGNOSIS — C44.121 SQUAMOUS CELL CANCER OF SKIN OF EYELID, LEFT: Primary | ICD-10-CM

## 2018-11-30 NOTE — PROGRESS NOTES
Preoperative Evaluation Date of Exam: 2018 Madeline Dyer is a 80 y.o. male (:3/17/1931) who presents for preoperative evaluation. Procedure/Surgery:Removal of skin cancer from the left lower eyelid. Date of Procedure/Surgery:  and 18. Surgeon: [facility] Facility: [facility] Primary Physician: Matty Huddleston MD 
Latex Allergy: no 
 
Problem List:    
Patient Active Problem List  
 Diagnosis Date Noted  Zenker's diverticulum 2018  Advanced care planning/counseling discussion 2016  S/P IVC filter 2016  GI bleed 2016  Pulmonary embolism ([facility] 2015  Hypothyroidism 10/03/2014  CLL (chronic lymphocytic leukemia) ([facility]  Hypercholesterolemia  Hypertension Medical History:    
Past Medical History:  
Diagnosis Date  Asthma  Autoimmune disease ([facility] CLL  Cancer ([facility] 14 MULTIPLE SCCAs EXCISED, ALSO BCCAs  CLL (chronic lymphocytic leukemia) ([facility]   
 no treatment  GERD (gastroesophageal reflux disease)  Hypercholesterolemia  Hypertension  Liver disease 2014 ADMITTED FOR LIVER DYSFUNCTION, NOT DIAGNOSED, NOW RESOLVED  Pulmonary embolism ([facility] 2015  Thyroid disease  Unspecified adverse effect of anesthesia \"out of it for 2 weeks\"  Unspecified sleep apnea   
 had sleep study but no follow up Allergies: Allergies Allergen Reactions  Other Food Other (comments) AVOIDS OTHER SPICES IN GENERAL DUE SEVERE REACTIONS TO OREGANO AND MUSTARD  Mustard Anaphylaxis THROAT CLOSES  
 Oregano Anaphylaxis THROAT CLOSES  Zithromax [Azithromycin] Anaphylaxis  Pcn [Penicillins] Unable to [employer]  Watermelon Swelling Lips swelling Medications:    
Current Outpatient Medications Medication Sig  
 B.infantis-B.ani-B.long-B.bifi (PROBIOTIC 4X) 10-15 mg TbEC Take  by mouth.  pravastatin (PRAVACHOL) 20 mg tablet TAKE ONE TABLET BY MOUTH NIGHTLY  hydroCHLOROthiazide (HYDRODIURIL) 25 mg tablet TAKE ONE TABLET BY MOUTH ONCE DAILY  OTHER Hemp Oil 4 drops per day.  albuterol (PROVENTIL HFA, VENTOLIN HFA, PROAIR HFA) 90 mcg/actuation inhaler Take 2 Puffs by inhalation every six (6) hours as needed for Wheezing.  levothyroxine (SYNTHROID) 150 mcg tablet TAKE ONE TABLET BY MOUTH DAILY BEFORE BREAKFAST. DOSE ADJUSTMENT  amLODIPine (NORVASC) 10 mg tablet TAKE ONE TABLET BY MOUTH ONCE DAILY  bismuth subsalicylate (PEPTO-BISMOL MAXIMUM STRENGTH) 525 mg/15 mL susp Take 30 mL by mouth every six (6) hours as needed.  cyanocobalamin (VITAMIN B-12) 1,000 mcg sublingual tablet Take 1,000 mcg by mouth daily.  diphenoxylate-atropine (LOMOTIL) 2.5-0.025 mg per tablet Take  by mouth four (4) times daily as needed for Diarrhea.  co-enzyme Q-10 (CO Q-10) 100 mg capsule Take 100 mg by mouth daily.  calcium carbonate (TUMS) 200 mg calcium (500 mg) chew Take 1 Tab by mouth as needed (heartburn).  MEN'S MULTI-VITAMIN PO Take 2 each by mouth daily.  cholecalciferol, vitamin D3, (VITAMIN D3) 2,000 unit tab Take 1 tablet by mouth daily.  OTHER \"alteril\" contains valerian, per pt's wife  aspirin delayed-release 325 mg tablet Take 1 Tab by mouth daily. No current facility-administered medications for this visit. Surgical History:    
Past Surgical History:  
Procedure Laterality Date  HX ABDOMINAL LAPAROSCOPY  4/2016  HX COLONOSCOPY X2, MOST RECNT WAS IN 2013  HX HEENT    
 tonsillectomy  HX HERNIA REPAIR  4/13/16  
 paraesophageal hernia repair Dr Edson Barrera  HX ORTHOPAEDIC Left Frozen shoulder manipulation Social History:    
Social History Socioeconomic History  Marital status:  Spouse name: Not on file  Number of children: Not on file  Years of education: Not on file  Highest education level: Not on file Tobacco Use  Smoking status: Never Smoker  Smokeless tobacco: Never Used Substance and Sexual Activity  Alcohol use: Yes Alcohol/week: 2.5 oz Types: 5 Standard drinks or equivalent per week Comment: occasional  
 Drug use: No  
 Sexual activity: Yes Recent use of: ASA Anesthesia Complications: None History of abnormal bleeding : None History of Blood Transfusions: no 
Health Care Directive or Living Will: yes REVIEW OF SYSTEMS: 
Has been doing reasonably well. His wife is concerned because he has not been doing his speech therapy exercises. He is not having any episodes of choking. Minimal cough. No sputum production. No fevers or chills. Change in bowel or bladder habits. EXAM:  
Visit Vitals /89 Pulse 68 Temp 98 °F (36.7 °C) (Oral) Resp 14 Ht 5' 10\" (1.778 m) Wt 162 lb (73.5 kg) SpO2 98% BMI 23.24 kg/m² General appearance - alert, well appearing, and in no distress Eyes -there are 2 erythematous papules on the left lower eyelid. Ears - bilateral TM's and external ear canals normal 
Nose - normal and patent, no erythema, discharge or polyps Mouth - mucous membranes moist, pharynx normal without lesions Neck - supple, no significant adenopathy Lymphatics - no palpable lymphadenopathy, no hepatosplenomegaly Chest - clear to auscultation, no wheezes, rales or rhonchi, symmetric air entry Heart - normal rate and regular rhythm Abdomen - soft, nontender, nondistended, no masses or organomegaly Extremities - peripheral pulses normal, no pedal edema, no clubbing or cyanosis DIAGNOSTICS:  
1. EKG: EKG FINDINGS - normal EKG, normal sinus rhythm, unchanged from previous tracings, sinus bradycardia IMPRESSION:  
Chronic pulmonary disease No contraindications to planned surgery Diagnoses and all orders for this visit: 
 
1. 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 2. Essential hypertension -blood pressure reasonably controlled. 3. Hypothyroidism due to acquired atrophy of thyroid -euthyroid. 4. Paroxysmal atrial fibrillation (HCC) -in sinus rhythm. -     AMB POC EKG ROUTINE W/ 12 LEADS, INTER & REP 5. Preoperative general physical examination 6. History of aspiration and dysphasiaappears stable. His wife wanted him to be referred back to speech therapy but he refused. Follow-up Disposition: as needed. Advised him to call back or return to office if symptoms worsen/change/persist. 
Discussed expected course/resolution/complications of diagnosis in detail with patient. Medication risks/benefits/costs/interactions/alternatives discussed with patient. He was given an after visit summary which includes diagnoses, current medications, & vitals. He expressed understanding with the diagnosis and plan. Luis M Millard MD  
11/30/2018

## 2018-12-05 ENCOUNTER — DOCUMENTATION ONLY (OUTPATIENT)
Dept: INTERNAL MEDICINE CLINIC | Age: 83
End: 2018-12-05

## 2018-12-06 DIAGNOSIS — I10 ESSENTIAL HYPERTENSION WITH GOAL BLOOD PRESSURE LESS THAN 140/90: ICD-10-CM

## 2018-12-06 RX ORDER — AMLODIPINE BESYLATE 10 MG/1
TABLET ORAL
Qty: 90 TAB | Refills: 4 | Status: SHIPPED | OUTPATIENT
Start: 2018-12-06 | End: 2019-12-26

## 2018-12-20 ENCOUNTER — TELEPHONE (OUTPATIENT)
Dept: INTERNAL MEDICINE CLINIC | Age: 83
End: 2018-12-20

## 2018-12-20 ENCOUNTER — CLINICAL SUPPORT (OUTPATIENT)
Dept: INTERNAL MEDICINE CLINIC | Age: 83
End: 2018-12-20

## 2018-12-20 VITALS — DIASTOLIC BLOOD PRESSURE: 75 MMHG | SYSTOLIC BLOOD PRESSURE: 131 MMHG

## 2018-12-20 DIAGNOSIS — I10 ESSENTIAL HYPERTENSION: Primary | ICD-10-CM

## 2018-12-20 NOTE — TELEPHONE ENCOUNTER
Meaghan Forrest () [phone]     pt's wife is requesting a call back regarding her 's bp and his medication. Leaving in about 20 mins for another 's appt.

## 2018-12-20 NOTE — PROGRESS NOTES
Chief Complaint   Patient presents with    Blood Pressure Check     /75, asymptomatic. Reviewed w/ MD no changes, fluids encouraged per SRJ. Pt verbalized understanding.

## 2019-01-11 ENCOUNTER — OFFICE VISIT (OUTPATIENT)
Dept: INTERNAL MEDICINE CLINIC | Age: 84
End: 2019-01-11

## 2019-01-11 VITALS
DIASTOLIC BLOOD PRESSURE: 79 MMHG | SYSTOLIC BLOOD PRESSURE: 120 MMHG | BODY MASS INDEX: 23.19 KG/M2 | RESPIRATION RATE: 14 BRPM | OXYGEN SATURATION: 98 % | TEMPERATURE: 97.7 F | HEIGHT: 70 IN | WEIGHT: 162 LBS | HEART RATE: 74 BPM

## 2019-01-11 DIAGNOSIS — E03.4 HYPOTHYROIDISM DUE TO ACQUIRED ATROPHY OF THYROID: ICD-10-CM

## 2019-01-11 DIAGNOSIS — I10 ESSENTIAL HYPERTENSION: ICD-10-CM

## 2019-01-11 DIAGNOSIS — C44.101: Primary | ICD-10-CM

## 2019-01-11 RX ORDER — ASPIRIN 81 MG/1
TABLET ORAL DAILY
COMMUNITY
End: 2019-04-12

## 2019-01-11 RX ORDER — MIRTAZAPINE 15 MG/1
TABLET, FILM COATED ORAL AS NEEDED
COMMUNITY
Start: 2018-12-20 | End: 2019-04-12

## 2019-01-11 RX ORDER — FLUOROURACIL 50 MG/G
CREAM TOPICAL
COMMUNITY
Start: 2018-10-30 | End: 2019-01-11 | Stop reason: CLARIF

## 2019-01-11 NOTE — PROGRESS NOTES
Preoperative Evaluation Date of Exam: 2019 Dayton Shannon is a 80 y.o. male (:3/17/1931) who presents for preoperative evaluation. Procedure/Surgery:Left Eye Suture Removal 
Date of Procedure/Surgery: 19 Surgeon: [facility]: [facility] Primary Physician: Georgiana Martínez MD 
Latex Allergy: no 
 
Problem List:    
Patient Active Problem List  
 Diagnosis Date Noted  Zenker's diverticulum 2018  Advanced care planning/counseling discussion 2016  S/P IVC filter 2016  GI bleed 2016  Pulmonary embolism ([facility] [address] 2015  Hypothyroidism 10/03/2014  CLL (chronic lymphocytic leukemia) ([facility] [address]  Hypercholesterolemia  Hypertension Medical History:    
Past Medical History:  
Diagnosis Date  Asthma  Autoimmune disease ([facility] [place] [address] CLL  Cancer ([facility] [address] 14 MULTIPLE SCCAs EXCISED, ALSO BCCAs  CLL (chronic lymphocytic leukemia) ([facility] [place] [address]   
 no treatment  GERD (gastroesophageal reflux disease)  Hypercholesterolemia  Hypertension  Liver disease 2014 ADMITTED FOR LIVER DYSFUNCTION, NOT DIAGNOSED, NOW RESOLVED  Pulmonary embolism ([facility] [place] [address] 2015  Thyroid disease  Unspecified adverse effect of anesthesia \"out of it for 2 weeks\"  Unspecified sleep apnea   
 had sleep study but no follow up Allergies: Allergies Allergen Reactions  Other Food Other (comments) AVOIDS OTHER SPICES IN GENERAL DUE SEVERE REACTIONS TO OREGANO AND MUSTARD  Mustard Anaphylaxis THROAT CLOSES  
 Oregano Anaphylaxis THROAT CLOSES  Zithromax [Azithromycin] Anaphylaxis  Pcn [Penicillins] Unable to [employer]  Watermelon Swelling Lips swelling Medications:    
Current Outpatient Medications Medication Sig  
 aspirin delayed-release 81 mg tablet Take  by mouth daily.  mirtazapine (REMERON) 15 mg tablet as needed.  amLODIPine (NORVASC) 10 mg tablet TAKE ONE TABLET BY MOUTH ONCE DAILY  
 B.infantis-B.ani-B.long-B.bifi (PROBIOTIC 4X) 10-15 mg TbEC Take  by mouth.  pravastatin (PRAVACHOL) 20 mg tablet TAKE ONE TABLET BY MOUTH NIGHTLY  hydroCHLOROthiazide (HYDRODIURIL) 25 mg tablet TAKE ONE TABLET BY MOUTH ONCE DAILY  OTHER \"alteril\" contains valerian, per pt's wife  OTHER Hemp Oil 4 drops per day.  albuterol (PROVENTIL HFA, VENTOLIN HFA, PROAIR HFA) 90 mcg/actuation inhaler Take 2 Puffs by inhalation every six (6) hours as needed for Wheezing.  levothyroxine (SYNTHROID) 150 mcg tablet TAKE ONE TABLET BY MOUTH DAILY BEFORE BREAKFAST. DOSE ADJUSTMENT  bismuth subsalicylate (PEPTO-BISMOL MAXIMUM STRENGTH) 525 mg/15 mL susp Take 30 mL by mouth every six (6) hours as needed.  cyanocobalamin (VITAMIN B-12) 1,000 mcg sublingual tablet Take 1,000 mcg by mouth daily.  diphenoxylate-atropine (LOMOTIL) 2.5-0.025 mg per tablet Take  by mouth four (4) times daily as needed for Diarrhea.  co-enzyme Q-10 (CO Q-10) 100 mg capsule Take 100 mg by mouth daily.  calcium carbonate (TUMS) 200 mg calcium (500 mg) chew Take 1 Tab by mouth as needed (heartburn).  MEN'S MULTI-VITAMIN PO Take 2 each by mouth daily.  cholecalciferol, vitamin D3, (VITAMIN D3) 2,000 unit tab Take 1 tablet by mouth daily. No current facility-administered medications for this visit. Surgical History:    
Past Surgical History:  
Procedure Laterality Date  HX ABDOMINAL LAPAROSCOPY  4/2016  HX COLONOSCOPY X2, MOST RECNT WAS IN 2013  HX HEENT    
 tonsillectomy  HX HERNIA REPAIR  4/13/16  
 paraesophageal hernia repair Dr Skylar Capone  HX ORTHOPAEDIC Left Frozen shoulder manipulation Social History:    
Social History Socioeconomic History  Marital status:  Spouse name: Not on file  Number of children: Not on file  Years of education: Not on file  Highest education level: Not on file Tobacco Use  Smoking status: Never Smoker  Smokeless tobacco: Never Used Substance and Sexual Activity  Alcohol use: Yes Alcohol/week: 1.2 oz Types: 2 Standard drinks or equivalent per week Comment: occasional  
 Drug use: No  
 Sexual activity: Yes Recent use of: ASA Tetanus up to date: last tetanus booster within 10 years Anesthesia Complications: None History of abnormal bleeding : None History of Blood Transfusions: yes Health Care Directive or Living Will: yes REVIEW OF SYSTEMS: 
A comprehensive review of systems was negative except for: Constitutional: positive for weight gain intentionally. Respiratory: positive for negative Cardiovascular: positive for none EXAM:  
Visit Vitals /79 Pulse 74 Temp 97.7 °F (36.5 °C) (Oral) Resp 14 Ht 5' 10\" (1.778 m) Wt 162 lb (73.5 kg) SpO2 98% BMI 23.24 kg/m² General appearance - alert, well appearing, and in no distress Eyes - right eye normal, left eye with lid sewn closed nearly with well healed scar below the lower lid. Mild erythema nd swelling. Mouth - mucous membranes moist, pharynx normal without lesions Neck - supple, no significant adenopathy Lymphatics - no palpable lymphadenopathy, no hepatosplenomegaly Chest - clear to auscultation, no wheezes, rales or rhonchi, symmetric air entry Heart - normal rate and regular rhythm Abdomen - soft, nontender, nondistended, no masses or organomegaly Neurological - alert, oriented, normal speech, no focal findings or movement disorder noted Musculoskeletal - no joint tenderness, deformity or swelling Extremities - peripheral pulses normal, no pedal edema, no clubbing or cyanosis IMPRESSION:  
History of skin cancer removal - doing well CLL - per heme. HTN - BP OK Hypothyroid - stable Low appetite - doing better. Plan - No contraindications to planned surgery Followup here for CPE as planned. Alex Bernard MD  
1/11/2019

## 2019-01-14 ENCOUNTER — DOCUMENTATION ONLY (OUTPATIENT)
Dept: INTERNAL MEDICINE CLINIC | Age: 84
End: 2019-01-14

## 2019-04-03 DIAGNOSIS — J06.9 UPPER RESPIRATORY TRACT INFECTION, UNSPECIFIED TYPE: ICD-10-CM

## 2019-04-03 RX ORDER — ALBUTEROL SULFATE 90 UG/1
2 AEROSOL, METERED RESPIRATORY (INHALATION)
Qty: 1 INHALER | Refills: 11 | Status: SHIPPED | OUTPATIENT
Start: 2019-04-03 | End: 2019-06-10 | Stop reason: ALTCHOICE

## 2019-04-12 RX ORDER — CALCIUM CARBONATE 200(500)MG
2 TABLET,CHEWABLE ORAL AS NEEDED
COMMUNITY
End: 2019-06-10 | Stop reason: ALTCHOICE

## 2019-04-12 RX ORDER — DIPHENOXYLATE HYDROCHLORIDE AND ATROPINE SULFATE 2.5; .025 MG/1; MG/1
1 TABLET ORAL AS NEEDED
COMMUNITY
End: 2019-06-10 | Stop reason: ALTCHOICE

## 2019-04-12 RX ORDER — GUAIFENESIN 100 MG/5ML
81 LIQUID (ML) ORAL DAILY
COMMUNITY

## 2019-04-12 RX ORDER — OMEPRAZOLE 40 MG/1
40 CAPSULE, DELAYED RELEASE ORAL DAILY
COMMUNITY

## 2019-04-13 ENCOUNTER — ANESTHESIA EVENT (OUTPATIENT)
Dept: ENDOSCOPY | Age: 84
End: 2019-04-13
Payer: MEDICARE

## 2019-04-14 NOTE — ANESTHESIA PREPROCEDURE EVALUATION
Relevant Problems No relevant active problems Anesthetic History No history of anesthetic complications Review of Systems / Medical History Patient summary reviewed, nursing notes reviewed and pertinent labs reviewed Pulmonary Sleep apnea Asthma Comments: Hx PE Neuro/Psych Within defined limits Cardiovascular Hypertension Hyperlipidemia GI/Hepatic/Renal 
  
GERD Endo/Other Hypothyroidism Other Findings Physical Exam 
 
Airway Mallampati: II 
TM Distance: > 6 cm Neck ROM: normal range of motion Mouth opening: Normal 
 
 Cardiovascular Regular rate and rhythm,  S1 and S2 normal,  no murmur, click, rub, or gallop Dental 
No notable dental hx Pulmonary Breath sounds clear to auscultation Abdominal 
GI exam deferred Other Findings Anesthetic Plan ASA: 3 Anesthesia type: MAC Anesthetic plan and risks discussed with: Patient

## 2019-04-15 ENCOUNTER — ANESTHESIA (OUTPATIENT)
Dept: ENDOSCOPY | Age: 84
End: 2019-04-15
Payer: MEDICARE

## 2019-04-15 ENCOUNTER — HOSPITAL ENCOUNTER (OUTPATIENT)
Age: 84
Setting detail: OUTPATIENT SURGERY
Discharge: HOME | End: 2019-04-15
Attending: INTERNAL MEDICINE | Admitting: INTERNAL MEDICINE
Payer: MEDICARE

## 2019-04-15 VITALS
HEIGHT: 70 IN | DIASTOLIC BLOOD PRESSURE: 74 MMHG | BODY MASS INDEX: 22.1 KG/M2 | SYSTOLIC BLOOD PRESSURE: 132 MMHG | HEART RATE: 60 BPM | TEMPERATURE: 97.2 F | OXYGEN SATURATION: 96 % | WEIGHT: 154.38 LBS | RESPIRATION RATE: 13 BRPM

## 2019-04-15 PROCEDURE — 77030018712 HC DEV BLLN INFL BSC -B: Performed by: INTERNAL MEDICINE

## 2019-04-15 PROCEDURE — 74011250636 HC RX REV CODE- 250/636: Performed by: INTERNAL MEDICINE

## 2019-04-15 PROCEDURE — C1726 CATH, BAL DIL, NON-VASCULAR: HCPCS | Performed by: INTERNAL MEDICINE

## 2019-04-15 PROCEDURE — 76040000019: Performed by: INTERNAL MEDICINE

## 2019-04-15 PROCEDURE — 76060000031 HC ANESTHESIA FIRST 0.5 HR: Performed by: INTERNAL MEDICINE

## 2019-04-15 PROCEDURE — 74011250636 HC RX REV CODE- 250/636

## 2019-04-15 RX ORDER — ATROPINE SULFATE 0.1 MG/ML
0.5 INJECTION INTRAVENOUS
Status: DISCONTINUED | OUTPATIENT
Start: 2019-04-15 | End: 2019-04-15 | Stop reason: HOSPADM

## 2019-04-15 RX ORDER — SODIUM CHLORIDE 0.9 % (FLUSH) 0.9 %
5-40 SYRINGE (ML) INJECTION EVERY 8 HOURS
Status: DISCONTINUED | OUTPATIENT
Start: 2019-04-15 | End: 2019-04-15 | Stop reason: HOSPADM

## 2019-04-15 RX ORDER — LIDOCAINE HYDROCHLORIDE 20 MG/ML
INJECTION, SOLUTION EPIDURAL; INFILTRATION; INTRACAUDAL; PERINEURAL AS NEEDED
Status: DISCONTINUED | OUTPATIENT
Start: 2019-04-15 | End: 2019-04-15 | Stop reason: HOSPADM

## 2019-04-15 RX ORDER — MIDAZOLAM HYDROCHLORIDE 1 MG/ML
.25-1 INJECTION, SOLUTION INTRAMUSCULAR; INTRAVENOUS
Status: DISCONTINUED | OUTPATIENT
Start: 2019-04-15 | End: 2019-04-15 | Stop reason: HOSPADM

## 2019-04-15 RX ORDER — SODIUM CHLORIDE, SODIUM LACTATE, POTASSIUM CHLORIDE, CALCIUM CHLORIDE 600; 310; 30; 20 MG/100ML; MG/100ML; MG/100ML; MG/100ML
INJECTION, SOLUTION INTRAVENOUS
Status: DISCONTINUED | OUTPATIENT
Start: 2019-04-15 | End: 2019-04-15 | Stop reason: HOSPADM

## 2019-04-15 RX ORDER — SODIUM CHLORIDE 0.9 % (FLUSH) 0.9 %
5-40 SYRINGE (ML) INJECTION AS NEEDED
Status: DISCONTINUED | OUTPATIENT
Start: 2019-04-15 | End: 2019-04-15 | Stop reason: HOSPADM

## 2019-04-15 RX ORDER — FENTANYL CITRATE 50 UG/ML
200 INJECTION, SOLUTION INTRAMUSCULAR; INTRAVENOUS
Status: DISCONTINUED | OUTPATIENT
Start: 2019-04-15 | End: 2019-04-15 | Stop reason: HOSPADM

## 2019-04-15 RX ORDER — PROPOFOL 10 MG/ML
INJECTION, EMULSION INTRAVENOUS AS NEEDED
Status: DISCONTINUED | OUTPATIENT
Start: 2019-04-15 | End: 2019-04-15 | Stop reason: HOSPADM

## 2019-04-15 RX ORDER — FLUMAZENIL 0.1 MG/ML
0.2 INJECTION INTRAVENOUS
Status: DISCONTINUED | OUTPATIENT
Start: 2019-04-15 | End: 2019-04-15 | Stop reason: HOSPADM

## 2019-04-15 RX ORDER — SODIUM CHLORIDE 9 MG/ML
100 INJECTION, SOLUTION INTRAVENOUS CONTINUOUS
Status: DISCONTINUED | OUTPATIENT
Start: 2019-04-15 | End: 2019-04-15 | Stop reason: HOSPADM

## 2019-04-15 RX ORDER — DEXTROMETHORPHAN/PSEUDOEPHED 2.5-7.5/.8
1.2 DROPS ORAL
Status: DISCONTINUED | OUTPATIENT
Start: 2019-04-15 | End: 2019-04-15 | Stop reason: HOSPADM

## 2019-04-15 RX ORDER — EPINEPHRINE 0.1 MG/ML
1 INJECTION INTRACARDIAC; INTRAVENOUS
Status: DISCONTINUED | OUTPATIENT
Start: 2019-04-15 | End: 2019-04-15 | Stop reason: HOSPADM

## 2019-04-15 RX ORDER — NALOXONE HYDROCHLORIDE 0.4 MG/ML
0.4 INJECTION, SOLUTION INTRAMUSCULAR; INTRAVENOUS; SUBCUTANEOUS
Status: DISCONTINUED | OUTPATIENT
Start: 2019-04-15 | End: 2019-04-15 | Stop reason: HOSPADM

## 2019-04-15 RX ADMIN — SODIUM CHLORIDE, SODIUM LACTATE, POTASSIUM CHLORIDE, CALCIUM CHLORIDE: 600; 310; 30; 20 INJECTION, SOLUTION INTRAVENOUS at 12:30

## 2019-04-15 RX ADMIN — PROPOFOL 10 MG: 10 INJECTION, EMULSION INTRAVENOUS at 12:41

## 2019-04-15 RX ADMIN — SODIUM CHLORIDE 100 ML/HR: 900 INJECTION, SOLUTION INTRAVENOUS at 11:30

## 2019-04-15 RX ADMIN — LIDOCAINE HYDROCHLORIDE 80 MG: 20 INJECTION, SOLUTION EPIDURAL; INFILTRATION; INTRACAUDAL; PERINEURAL at 12:35

## 2019-04-15 RX ADMIN — PROPOFOL 50 MG: 10 INJECTION, EMULSION INTRAVENOUS at 12:35

## 2019-04-15 RX ADMIN — PROPOFOL 20 MG: 10 INJECTION, EMULSION INTRAVENOUS at 12:38

## 2019-04-15 NOTE — PROGRESS NOTES
Received report from Anesthesia-see anesthesia record for vital signs and medications administered during procedure. Resumed care for vital signs and medications.     Endoscope was pre-cleaned at the bedside immediately following procedure by xu endo tech

## 2019-04-15 NOTE — DISCHARGE INSTRUCTIONS
[address] [facility], [address] [place] [address]    EGD DISCHARGE INSTRUCTIONS    Elida March  [id number]  3/17/1931    Discomfort:  Sore throat- warm salt water gargle  redness at IV site- apply warm compress to area; if redness or soreness persist- contact your physician  Gaseous discomfort- walking, belching will help relieve any discomfort  You may not operate a vehicle for 12 hours  You may not engage in an occupation involving machinery or appliances for rest of today  You may not drink alcoholic beverages for at least 12 hours  Avoid making any critical decisions for at least 24 hour  DIET  You may resume your regular diet - however -  remember your colon is empty and a heavy meal will produce gas. Avoid these foods:  vegetables, fried / greasy foods, carbonated drinks    ACTIVITY  You may resume your normal daily activities   Spend the remainder of the day resting -  avoid any strenuous activity. CALL M.D. ANY SIGN OF   Increasing pain, nausea, vomiting  Abdominal distension (swelling)  New increased bleeding (oral or rectal)  Fever (chills)  Pain in chest area    Shortness of breath    Follow-up Instructions:   Call Dr. Ira Mcgregor for any questions or problems and follow up with him in 6 weeks  Telephone # [phone]    ENDOSCOPY FINDINGS:   Your endoscopy showed hiatal hernia and esophageal stricture dilated. Signed By: Ira Mcgregor MD     4/15/2019  12:53 PM     Patient Education   Patient Education        Hiatal Hernia: Care Instructions  Your Care Instructions  A hiatal hernia occurs when part of the stomach bulges into the chest cavity. A hiatal hernia may allow stomach acid and juices to back up into the esophagus (acid reflux). This can cause a feeling of burning, warmth, heat, or pain behind the breastbone. This feeling may often occur after you eat, soon after you lie down, or when you bend forward, and it may come and go.  You also may have a sour taste in your mouth. These symptoms are commonly known as heartburn or reflux. But not all hiatal hernias cause symptoms. Follow-up care is a key part of your treatment and safety. Be sure to make and go to all appointments, and call your doctor if you are having problems. It's also a good idea to know your test results and keep a list of the medicines you take. How can you care for yourself at home? · Take your medicines exactly as prescribed. Call your doctor if you think you are having a problem with your medicine. · Do not take aspirin or other nonsteroidal anti-inflammatory drugs (NSAIDs), such as ibuprofen (Advil, Motrin) or naproxen (Aleve), unless your doctor says it is okay. Ask your doctor what you can take for pain. · Your doctor may recommend over-the-counter medicine. For mild or occasional indigestion, antacids such as Tums, Gaviscon, Maalox, or Mylanta may help. Your doctor also may recommend over-the-counter acid reducers, such as famotidine (Pepcid AC), cimetidine (Tagamet HB), ranitidine (Zantac 75 and Zantac 150), or omeprazole (Prilosec). Read and follow all instructions on the label. If you use these medicines often, talk with your doctor. · Change your eating habits. ? It's best to eat several small meals instead of two or three large meals. ? After you eat, wait 2 to 3 hours before you lie down. Late-night snacks aren't a good idea. ? Chocolate, mint, and alcohol can make heartburn worse. They relax the valve between the esophagus and the stomach. ? Spicy foods, foods that have a lot of acid (like tomatoes and oranges), and coffee can make heartburn symptoms worse in some people. If your symptoms are worse after you eat a certain food, you may want to stop eating that food to see if your symptoms get better.   · Do not smoke or chew tobacco.  · If you get heartburn at night, raise the head of your bed 6 to 8 inches by putting the frame on blocks or placing a foam wedge under the head of your mattress. (Adding extra pillows does not work.)  · Do not wear tight clothing around your middle. · Lose weight if you need to. Losing just 5 to 10 pounds can help. When should you call for help? Call your doctor now or seek immediate medical care if:    · You have new or worse belly pain.     · You are vomiting.    Watch closely for changes in your health, and be sure to contact your doctor if:    · You have new or worse symptoms of indigestion.     · You have trouble or pain swallowing.     · You are losing weight.     · You do not get better as expected. Where can you learn more? Go to http://spring-aamir.info/. Enter T468 in the search box to learn more about \"Hiatal Hernia: Care Instructions. \"  Current as of: March 27, 2018  Content Version: 11.9  © 2753-6351 [employer]. Care instructions adapted under license by [employer] (which disclaims liability or warranty for this information). If you have questions about a medical condition or this instruction, always ask your healthcare professional. [place] [address] any warranty or liability for your use of this information. Esophageal Dilation: What to Expect at [address] [place] [address]  After you have esophageal dilation, you will stay at the hospital or surgery center for 1 to 2 hours. This will allow the medicine to wear off. You will be able to go home after your doctor or nurse checks to make sure you are not having any problems. This care sheet gives you a general idea about how long it will take for you to recover. But each person recovers at a different pace. Follow the steps below to get better as quickly as possible. How can you care for yourself at home? Activity    · Rest as much as you need to after you go home.     · You should be able to go back to your usual activities the day after the procedure.    Diet    · Follow your doctor's directions for eating after the procedure.     · Drink plenty of fluids (unless your doctor has told you not to). Medicines    · Your doctor will tell you if and when you can restart your medicines. He or she will also give you instructions about taking any new medicines.     · If you take blood thinners, such as warfarin (Coumadin), clopidogrel (Plavix), or aspirin, be sure to talk to your doctor. He or she will tell you if and when to start taking those medicines again. Make sure that you understand exactly what your doctor wants you to do.     · If you have a sore throat the day after the procedure, use an over-the-counter spray to numb your throat. Sucking on throat lozenges and gargling with warm salt water may also help relieve your symptoms. Follow-up care is a key part of your treatment and safety. Be sure to make and go to all appointments, and call your doctor if you are having problems. It's also a good idea to know your test results and keep a list of the medicines you take. When should you call for help? Call 911 anytime you think you may need emergency care. For example, call if:    · You passed out (lost consciousness).     · You have trouble breathing.     · Your stools are maroon or very bloody    Call your doctor now or seek immediate medical care if:    · You have new or worse belly pain.     · You have a fever.     · You have new or more blood in your stools.     · You are sick to your stomach and cannot drink fluids.     · You cannot pass stools or gas.     · You have pain that does not get better after you take pain medicine.    Watch closely for changes in your health, and be sure to contact your doctor if:    · Your throat still hurts after a day or two.     · You do not get better as expected. Where can you learn more? Go to http://spring-aamir.info/. Enter Y819 in the search box to learn more about \"Esophageal Dilation: What to Expect at Home. \"  Current as of: March 27, 2018  Content Version: 11.9  © 8102-8548 [employer]. Care instructions adapted under license by [employer] (which disclaims liability or warranty for this information). If you have questions about a medical condition or this instruction, always ask your healthcare professional. [address] any warranty or liability for your use of this information.

## 2019-04-15 NOTE — ANESTHESIA POSTPROCEDURE EVALUATION
Post-Anesthesia Evaluation and Assessment Patient: Brett Lockett MRN: [id number]  SSN: [id number] YOB: 1931  Age: 80 y.o. Sex: male I have evaluated the patient and they are stable and ready for discharge from the PACU. Cardiovascular Function/Vital Signs Visit Vitals /74 Pulse 60 Temp 36.2 °C (97.2 °F) Resp 13 Ht 5' 10\" (1.778 m) Wt 70 kg (154 lb 6 oz) SpO2 96% BMI 22.15 kg/m² Patient is status post MAC anesthesia for Procedure(s): ESOPHAGOGASTRODUODENOSCOPY (EGD) ESOPHAGEAL DILATION. Nausea/Vomiting: None Postoperative hydration reviewed and adequate. Pain: 
Pain Scale 1: Numeric (0 - 10) (04/15/19 1309) Pain Intensity 1: 0 (04/15/19 1309) Managed Neurological Status: At baseline Mental Status, Level of Consciousness: Alert and  oriented to person, place, and time Pulmonary Status:  
O2 Device: Room air (04/15/19 1309) Adequate oxygenation and airway patent Complications related to anesthesia: None Post-anesthesia assessment completed. No concerns Signed By: James Darnell MD   
 April 15, 2019 Procedure(s): ESOPHAGOGASTRODUODENOSCOPY (EGD) ESOPHAGEAL DILATION. MAC 
 
<BSHSIANPOST> Vitals Value Taken Time /77 4/15/2019  1:14 PM  
Temp 36.2 °C (97.2 °F) 4/15/2019  1:04 PM  
Pulse 52 4/15/2019  1:16 PM  
Resp 18 4/15/2019  1:16 PM  
SpO2 97 % 4/15/2019  1:16 PM  
Vitals shown include unvalidated device data.

## 2019-04-15 NOTE — H&P
The patient is a 80year old male who presents with a complaint of difficulty swallowing. The patient presents for for routine follow-up. The onset of the difficulty swallowing has been gradual and has been occurring for 2 years. The course has been severe. The difficulty swallowing is described as being located in the neck and throat. The symptoms are aggravated by solids only and  are relieved by  regurgitation and repeated swallowing. The symptoms have been associated with hoarseness following dysphagia, loss of appetite and weight loss, while the symptoms have not been associated with abdominal pain, heartburn or odynophagia. The patient has been using other medication (pepto bismol, tums, and gas-x). The difficulty swallowing is characterized as worse. The patient had an EGD 2 year(s) ago. Previous diagnostic tests have included Barium swallow. Previous surgery : other (hiatal hernia repair). The patient has lost pounds. The patient has poor nutrition. Note for \"Difficulty swallowing \": He took speech therapy last Fall and was discharged last November with a normal barium swallow. He admits to not keeping up with exercises. 6/22/18: He feels quite a bit better since EGD. He eats better in the mornings, he has trouble with meats in the evenings. He continues to have a poor appetite. His wife has been putting protein soliz in his ice cream.    9/24/218: His wife is trying hard to help him gain weight. He has some trouble swallowing breads and meats but is able to eat most things with good chewing. They have seen dietitian with Dr. Michael Miles (follows for CLL). He doesn't have an appetite mostly due to taste of foods. They have to eat small meals. She reports soup goes down well. He has been off caffeine for 3 weeks, unbeknownst to him. He hasn't had diarrhea since stopping caffeine. They enjoy a summer place in [place].     3/28/19: He reports room for improvement but no major complaints, his wife disagrees and says he's doing horrible. He won't drink supplements because he doesn't like them. He has not been doing his SLP exercises. He has gagging with a lot of solid intake. He hasn't had any more diarrhea. Problem List/[facility]; 3/28/2019 11:13 AM)  Hypercholesterolemia    Chronic lymphatic leukemia (204.10  C91.90)    Hypertension    Asthma    Hearing aid worn (V45.89  Z97.4)    Diarrhea (787.91  R19.7)    Hiatal hernia (553.3  K44.9)    HEARTBURN (787.1  R12)    Dysphagia (787.20  R13.10)   5/14/2018: grade 3 erosive esophagitis at G-E junction, 5 cm hiatal hernia, stricture/stenosis at center of hernia dilated, undigested food in stomach hiatal hernia repaired by Dr. Phan Person 2016 Barium Esophagram: difficulty swallowing tablet with some aspiration of water; small fixed hiatal hernia, tablet did no pass form the hiatal hernia into the infradiaphragmatic stomach even in the upright position; marked presbyesophagus; small wide mouth Zenker's diverticulum  Weight loss (783.21  R63.4)      Past Surgical History [facility] January; 3/28/2019 11:13 AM)  Shoulder Surgery    Tonsillectomy    Skin cancer of face (173.31  C44.310)    Hernia Repair      Allergies [facility]; 3/28/2019 11:13 AM)  No Known Drug Allergies  [03/12/2015]:  No Known Allergies  [03/12/2015]: Medication History [facility]; 3/28/2019 11:13 AM)  AmLODIPine Besylate  (10MG Tablet, Oral daily) Active. Pravastatin Sodium  (20MG Tablet, Oral daily) Active. Hydrochlorothiazide  (25MG Tablet, Oral daily) Active. Levothyroxine Sodium  (112MCG Tablet, Oral daily) Active. Aspirin  (81MG Tablet, Oral daily) Active. Omeprazole  (20MG Capsule DR, Oral daily) Active. Vitamin D  (1000UNIT Tablet, Oral) Active. CoQ10  (100MG Capsule, Oral) Active. Vitamin B12  (1000MCG Tablet ER, Oral daily) Active.   Medications Reconciled     Family History [facility]; 3/28/2019 11:13 AM)  Heart Disease   Mother. Parkinson's Disease   Father. Social [facility]; 3/28/2019 11:13 AM)  Blood Transfusion   Yes. Marital status   . Tobacco Use   Never smoker. Alcohol Use   Occasional alcohol use. Employment status   Retired. Diagnostic Studies [facility]; 3/28/2019 11:13 AM)  Colonoscopy      Health Maintenance History [facility]; 3/28/2019 11:13 AM)  Flu Vaccine   Date: 10/1/2014. Pneumovax  [2016]:        [facility]; 3/28/2019 11:13 AM)  General Not Present- Chronic Fatigue, Poor Appetite, Weight Gain and Weight Loss. Skin Not Present- Itching, Rash and Skin Color Changes. HEENT Not Present- Hearing Loss and Vertigo. Respiratory Not Present- Difficulty Breathing and TB exposure. Cardiovascular Not Present- Chest Pain, Use of Antibiotics before Dental Procedures and Use of Blood Thinners. Gastrointestinal Present- See HPI. Musculoskeletal Not Present- Arthritis, Hip Replacement Surgery and Knee Replacement Surgery. Neurological Not Present- Weakness. Psychiatric Not Present- Depression. Endocrine Not Present- Diabetes and Thyroid Problems. Hematology Not Present- Anemia. Vitals [facility]; 3/28/2019 11:16 AM)  3/28/2019 11:10 AM  Weight: 159.5 lb   Height: 71 in   Body Surface Area: 1.92 m²   Body Mass Index: 22.25 kg/m²    Pulse: 84 (Regular)     BP: 132/74 (Sitting, Left Arm, Standard)              Physical Exam Tara Chano [facility]into [facility]; 3/28/2019 12:01 PM)  General  Mental Status - Alert. General Appearance - Cooperative, Pleasant, Not in acute distress. Orientation - Oriented X3. Build & Nutrition - Well developed and Frail appearing. Integumentary  General Characteristics  Color - normal coloration of skin. Temperature - normal warmth is noted. Mobility & Turgor - normal mobility and turgor. Head and Neck  Head - normocephalic, atraumatic with no lesions or palpable masses.   Neck  Global Assessment - full range of motion and supple. Trachea - midline. Eye  Eyeball - Bilateral - No Exophthalmos. Sclera/Conjunctiva - Left - No Jaundice. Sclera/Conjunctiva - Right - No Jaundice. Chest and Lung Exam  Chest and lung exam reveals  - quiet, even and easy respiratory effort with no use of accessory muscles. Auscultation  Breath sounds - Normal. Adventitious sounds - No Adventitious sounds. Cardiovascular  Auscultation  Rhythm - Regular, No Tachycardia, No Bradycardia . Heart Sounds - Normal heart sounds , S1 WNL and S2 WNL, No S3, No Summation Gallop. Murmurs & Other Heart Sounds - Auscultation of the heart reveals - No Murmurs. Abdomen  Inspection  Inspection of the abdomen reveals - Soft. Palpation/Percussion  Tenderness - Non-Tender. Rebound tenderness - No rebound. Rigidity (guarding) - No Rigidity. Dullness to percussion - No abnormal dullness to percussion. Liver - No hepatosplenomegaly. Abdominal Mass Palpable - No masses. Other Characteristics - No Ascites. Auscultation  Auscultation of the abdomen reveals - Bowel sounds normal, No Abdominal bruits and No Succussion splash. Rectal - Did not examine. Peripheral Vascular  Upper Extremity  Inspection - Left - Normal - No Clubbing, No Cyanosis, No Edema, Pulses Intact. Right - Normal - No Clubbing, No Cyanosis, No Edema, Pulses Intact. Palpation - Edema - Left - No edema. Right - No edema. Neurologic  Neurologic evaluation reveals  - Cranial nerves grossly intact, no focal neurologic deficits. Musculoskeletal  Global Assessment  Gait and Station - normal gait and station. Assessment & Plan (Alessandro Pinto [facility]; 3/28/2019 12:32 PM)  Dysphagia (787.20  R13.10)  Story: CT 2/22/19: patulous esophagus, diffuse mucosal enhancement, minimal retained versus refluxed contrast, ? inflammatory in nature  5/14/2018: grade 3 erosive esophagitis at G-E junction, 5 cm hiatal hernia, stricture/stenosis at center of hernia dilated, undigested food in stomach  hiatal hernia repaired by Dr. Ochoa Nice 2016    Barium Esophagram: difficulty swallowing tablet with some aspiration of water; small fixed hiatal hernia, tablet did no pass form the hiatal hernia into the infradiaphragmatic stomach even in the upright position; marked presbyesophagus; small wide mouth Zenker's diverticulum  Impression: He has worsening dysphagia. He is loosing some weight. He stopped PPI for some reason, will resume. Will further evaluate with EGD. Current Plans  Pt Education - How to access health information online: discussed with patient and provided information. Endoscopy (94122) (Discussed risks and benefits with the patient to include: perforation,or post biopsy bleeding, missed lesions, and sedation reactions.)  Started Omeprazole 40MG, 1 (one) Capsule daily, #30, 30 days starting 03/28/2019, Ref. x11. Patient is to call me for any questions or concerns. Case discussed personally with a physician in the office regarding the presentation, pertinent physical findings and development of a diagnostic and therapeutic plan. Follow up office visit after procedure  This patient was reviewed and seen in collaboration with Dr. Jolene Salvador. Weight loss (783.21  R63.4)  Date of Surgery Update:  Mike Anglin was seen and examined. History and physical has been reviewed. The patient has been examined.  There have been no significant clinical changes since the completion of the originally dated History and Physical.    Signed By: Jolene Salvador MD     April 15, 2019 12:27 PM

## 2019-04-15 NOTE — ROUTINE PROCESS
Mike Anglin  3/17/1931  [id number]    Situation:  Verbal report received from: JACEK Rodas RN  Procedure: Procedure(s):  ESOPHAGOGASTRODUODENOSCOPY (EGD)  ESOPHAGEAL DILATION    Background:    Preoperative diagnosis: DYSPHAGIA  Postoperative diagnosis: hiatal hernia, esophageal ring    :  Dr. Beatriz Colin  Assistant(s): Endoscopy Technician-1: Willian Leung  Endoscopy RN-1: Nakita     Specimens: * No specimens in log *  H. Pylori  no    Assessment:  Intra-procedure medications     Anesthesia gave intra-procedure sedation and medications, see anesthesia flow sheet yes    Intravenous fluids: NS@ KVO     Vital signs stable     Abdominal assessment: round and soft     Recommendation:  Discharge patient per MD order.     Family or Friend   Permission to share finding with family or friend yes

## 2019-04-15 NOTE — PROCEDURES
[address], [address] [place] [address]        Esophago- Gastroduodenoscopy (EGD) Procedure Note    Mike Alcantara  3/17/1931  [id number]      Procedure: Endoscopic Gastroduodenoscopy with esophageal dilation    Indication:  Dysphagia/odynophagia     Pre-operative Diagnosis: see indication above    Post-operative Diagnosis: see findings below    : Arianna Alvares MD    Surgical Assistant: None    Implants:  None    Referring Provider:  Jacob Boyer MD      Anesthesia/Sedation:  MAC anesthesia Propofol        Procedure Details     After infomed consent was obtained for the procedure, with all risks and benefits of procedure explained the patient was taken to the endoscopy suite and placed in the left lateral decubitus position. Following sequential administration of sedation as per above, the endoscope was inserted into the mouth and advanced under direct vision to third portion of the duodenum. A careful inspection was made as the gastroscope was withdrawn, including a retroflexed view of the proximal stomach; findings and interventions are described below. Findings:   Esophagus:  5 cm hiatal hernia, there was esophageal ring at G-E junction, dilated with CRE balloon ( 15, 16.5, 18 mm) kept at 18 mm for 1 minute  Stomach: normal   There was some undigested food in stomach  Duodenum/jejunum: normal    Therapies:  As above    Specimens: none         EBL: None      Complications:   None; patient tolerated the procedure well. Impression:    -See post-procedure diagnoses. Recommendations:  -Continue acid suppression.   -f/u in 6 weeks  -consider surgical referral to Dr. Lance José if no improvement    Signed By: Arianna Alvares MD     4/15/2019  12:50 PM

## 2019-05-10 RX ORDER — LEVOTHYROXINE SODIUM 150 UG/1
TABLET ORAL
Qty: 85 TAB | Refills: 2 | Status: SHIPPED | OUTPATIENT
Start: 2019-05-10 | End: 2019-06-12 | Stop reason: DRUGHIGH

## 2019-05-12 ENCOUNTER — APPOINTMENT (OUTPATIENT)
Dept: CT IMAGING | Age: 84
End: 2019-05-12
Attending: EMERGENCY MEDICINE
Payer: MEDICARE

## 2019-05-12 ENCOUNTER — TELEPHONE (OUTPATIENT)
Dept: INTERNAL MEDICINE CLINIC | Age: 84
End: 2019-05-12

## 2019-05-12 ENCOUNTER — APPOINTMENT (OUTPATIENT)
Dept: GENERAL RADIOLOGY | Age: 84
End: 2019-05-12
Attending: EMERGENCY MEDICINE
Payer: MEDICARE

## 2019-05-12 ENCOUNTER — HOSPITAL ENCOUNTER (EMERGENCY)
Age: 84
Discharge: HOME | End: 2019-05-12
Attending: EMERGENCY MEDICINE
Payer: MEDICARE

## 2019-05-12 VITALS
RESPIRATION RATE: 16 BRPM | HEART RATE: 49 BPM | SYSTOLIC BLOOD PRESSURE: 116 MMHG | HEIGHT: 70 IN | DIASTOLIC BLOOD PRESSURE: 65 MMHG | WEIGHT: 155.65 LBS | TEMPERATURE: 98 F | OXYGEN SATURATION: 99 % | BODY MASS INDEX: 22.28 KG/M2

## 2019-05-12 DIAGNOSIS — J20.9 ACUTE BRONCHITIS, UNSPECIFIED ORGANISM: Primary | ICD-10-CM

## 2019-05-12 LAB
ALBUMIN SERPL-MCNC: 3.6 G/DL (ref 3.5–5)
ALBUMIN/GLOB SERPL: 0.9 {RATIO} (ref 1.1–2.2)
ALP SERPL-CCNC: 74 U/L (ref 45–117)
ALT SERPL-CCNC: 21 U/L (ref 12–78)
ANION GAP SERPL CALC-SCNC: 8 MMOL/L (ref 5–15)
APTT PPP: 24.5 SEC (ref 22.1–32)
AST SERPL-CCNC: 21 U/L (ref 15–37)
BASOPHILS # BLD: 0 K/UL (ref 0–0.1)
BASOPHILS NFR BLD: 0 % (ref 0–1)
BILIRUB SERPL-MCNC: 0.5 MG/DL (ref 0.2–1)
BNP SERPL-MCNC: 949 PG/ML
BUN SERPL-MCNC: 16 MG/DL (ref 6–20)
BUN/CREAT SERPL: 14 (ref 12–20)
CALCIUM SERPL-MCNC: 9.4 MG/DL (ref 8.5–10.1)
CHLORIDE SERPL-SCNC: 105 MMOL/L (ref 97–108)
CO2 SERPL-SCNC: 28 MMOL/L (ref 21–32)
COMMENT, HOLDF: NORMAL
CREAT SERPL-MCNC: 1.12 MG/DL (ref 0.7–1.3)
DIFFERENTIAL METHOD BLD: ABNORMAL
EOSINOPHIL # BLD: 0.3 K/UL (ref 0–0.4)
EOSINOPHIL NFR BLD: 5 % (ref 0–7)
ERYTHROCYTE [DISTWIDTH] IN BLOOD BY AUTOMATED COUNT: 13.5 % (ref 11.5–14.5)
GLOBULIN SER CALC-MCNC: 3.9 G/DL (ref 2–4)
GLUCOSE SERPL-MCNC: 106 MG/DL (ref 65–100)
HCT VFR BLD AUTO: 41.7 % (ref 36.6–50.3)
HGB BLD-MCNC: 13.3 G/DL (ref 12.1–17)
IMM GRANULOCYTES # BLD AUTO: 0 K/UL (ref 0–0.04)
IMM GRANULOCYTES NFR BLD AUTO: 0 % (ref 0–0.5)
INR PPP: 1 (ref 0.9–1.1)
LYMPHOCYTES # BLD: 1.1 K/UL (ref 0.8–3.5)
LYMPHOCYTES NFR BLD: 19 % (ref 12–49)
MCH RBC QN AUTO: 28.5 PG (ref 26–34)
MCHC RBC AUTO-ENTMCNC: 31.9 G/DL (ref 30–36.5)
MCV RBC AUTO: 89.3 FL (ref 80–99)
MONOCYTES # BLD: 0.4 K/UL (ref 0–1)
MONOCYTES NFR BLD: 6 % (ref 5–13)
NEUTS SEG # BLD: 3.9 K/UL (ref 1.8–8)
NEUTS SEG NFR BLD: 70 % (ref 32–75)
NRBC # BLD: 0 K/UL (ref 0–0.01)
NRBC BLD-RTO: 0 PER 100 WBC
PLATELET # BLD AUTO: 143 K/UL (ref 150–400)
PMV BLD AUTO: 9.8 FL (ref 8.9–12.9)
POTASSIUM SERPL-SCNC: 3.6 MMOL/L (ref 3.5–5.1)
PROT SERPL-MCNC: 7.5 G/DL (ref 6.4–8.2)
PROTHROMBIN TIME: 9.8 SEC (ref 9–11.1)
RBC # BLD AUTO: 4.67 M/UL (ref 4.1–5.7)
SAMPLES BEING HELD,HOLD: NORMAL
SODIUM SERPL-SCNC: 141 MMOL/L (ref 136–145)
THERAPEUTIC RANGE,PTTT: NORMAL SECS (ref 58–77)
TROPONIN I SERPL-MCNC: <0.05 NG/ML
WBC # BLD AUTO: 5.7 K/UL (ref 4.1–11.1)

## 2019-05-12 PROCEDURE — 83880 ASSAY OF NATRIURETIC PEPTIDE: CPT

## 2019-05-12 PROCEDURE — 99285 EMERGENCY DEPT VISIT HI MDM: CPT

## 2019-05-12 PROCEDURE — 71250 CT THORAX DX C-: CPT

## 2019-05-12 PROCEDURE — 80053 COMPREHEN METABOLIC PANEL: CPT

## 2019-05-12 PROCEDURE — 71046 X-RAY EXAM CHEST 2 VIEWS: CPT

## 2019-05-12 PROCEDURE — 85025 COMPLETE CBC W/AUTO DIFF WBC: CPT

## 2019-05-12 PROCEDURE — 84484 ASSAY OF TROPONIN QUANT: CPT

## 2019-05-12 PROCEDURE — 85730 THROMBOPLASTIN TIME PARTIAL: CPT

## 2019-05-12 PROCEDURE — 74011250637 HC RX REV CODE- 250/637: Performed by: EMERGENCY MEDICINE

## 2019-05-12 PROCEDURE — 85610 PROTHROMBIN TIME: CPT

## 2019-05-12 PROCEDURE — 36415 COLL VENOUS BLD VENIPUNCTURE: CPT

## 2019-05-12 RX ORDER — CODEINE PHOSPHATE AND GUAIFENESIN 10; 100 MG/5ML; MG/5ML
5 SOLUTION ORAL
Qty: 120 ML | Refills: 0 | Status: SHIPPED | OUTPATIENT
Start: 2019-05-12 | End: 2019-05-17

## 2019-05-12 RX ORDER — MIRTAZAPINE 15 MG/1
15 TABLET, FILM COATED ORAL
COMMUNITY
End: 2019-06-10 | Stop reason: ALTCHOICE

## 2019-05-12 RX ORDER — HYDROCODONE BITARTRATE AND ACETAMINOPHEN 5; 325 MG/1; MG/1
1 TABLET ORAL
Qty: 20 TAB | Refills: 0 | Status: SHIPPED | OUTPATIENT
Start: 2019-05-12 | End: 2019-05-12

## 2019-05-12 RX ORDER — LEVOFLOXACIN 750 MG/1
750 TABLET ORAL DAILY
Qty: 10 TAB | Refills: 0 | Status: SHIPPED | OUTPATIENT
Start: 2019-05-12 | End: 2019-06-10 | Stop reason: ALTCHOICE

## 2019-05-12 RX ADMIN — LEVOFLOXACIN 750 MG: 500 TABLET, FILM COATED ORAL at 14:49

## 2019-05-12 NOTE — TELEPHONE ENCOUNTER
On call- reports heavy liquid cough, rapid wt loss and feels terrible.  Advised ER , follow up with Dr Princess Walls as directed

## 2019-05-12 NOTE — ED TRIAGE NOTES
Triage note: Pt arrives arrives with c/o productive cough x 1 week. Pt recently stayed in hotel in [address] [place] [address]. Wife notes pt coughs when he eats which has been going on for \"a while. \" Pt also has decreased appetite and 8lb weight loss over a few months.

## 2019-05-12 NOTE — ED PROVIDER NOTES
80 y.o. male with past medical history significant for GERD, thyroid disease, hypercholesterolemia, HTN, liver disease, unspecified adverse effect of anesthesia, sleep apnea, CLL, pulmonary embolism, cancer, and asthma who presents from home via personal vehicle with chief complaint of cough. Per spouse, the patient developed a hoarse cough last week. Spouse states patient's cough started after staying in a hotel in [facility] for a reunion. Spouse states patient has also been experiencing a gradually loss of weight over the past year (~160 to 152.6 lbs). Spouse also notes patient underwent chemotherapy last year to treat lymphoma, which was cured. Patient states he does not have COPD, and uses albuterol to help treat asthma. Patient affirms cough, weight loss, and lack of appetite. Patient denies fever and night sweats. There are no other acute medical concerns at this time. Social hx: Never smoker, weekly EtOH use. PCP: Marilia Ortiz MD 
 
Note written by Tosha Sethi, as dictated by Francine Alexander MD 11:10 AM  
 
 
 
 
The history is provided by the patient and the spouse. No  was used. Past Medical History:  
Diagnosis Date  Asthma   
 outgrew this per pt  Autoimmune disease ([address] CLL  Cancer ([address] 6/20/14 MULTIPLE SCCAs EXCISED, ALSO BCCAs  CLL (chronic lymphocytic leukemia) ([address] 2 infusions/states he no longer has this  GERD (gastroesophageal reflux disease)  Hypercholesterolemia  Hypertension  Liver disease 6/6/2014 ADMITTED FOR LIVER DYSFUNCTION, NOT DIAGNOSED, NOW RESOLVED  Pulmonary embolism ([address] 9/2015  Thyroid disease  Unspecified adverse effect of anesthesia \"out of it for 2 weeks\"  Unspecified sleep apnea   
 had sleep study but no follow up Past Surgical History:  
Procedure Laterality Date  HX ABDOMINAL LAPAROSCOPY  4/2016  HX COLONOSCOPY X2, MOST RECNT WAS IN 2013  HX HEENT    
 tonsillectomy  HX HERNIA REPAIR  4/13/16  
 paraesophageal hernia repair Dr Emily Garcia  HX ORTHOPAEDIC Left Frozen shoulder manipulation  HX OTHER SURGICAL    
 moh's procedures - face Family History:  
Problem Relation Age of Onset  Heart Disease Mother  Heart Failure Mother  Heart Disease Father  Parkinson's Disease Father  Pneumonia Father  Cancer Sister Lung CA??? - SMOKER  
 Heart Disease Sister  Anesth Problems Neg Hx Social History Socioeconomic History  Marital status:  Spouse name: Not on file  Number of children: Not on file  Years of education: Not on file  Highest education level: Not on file Occupational History  Not on file Social Needs  Financial resource strain: Not on file  Food insecurity:  
  Worry: Not on file Inability: Not on file  Transportation needs:  
  Medical: Not on file Non-medical: Not on file Tobacco Use  Smoking status: Never Smoker  Smokeless tobacco: Never Used Substance and Sexual Activity  Alcohol use: Yes Alcohol/week: 1.2 oz Types: 2 Standard drinks or equivalent per week Comment: occasional  
 Drug use: No  
 Sexual activity: Yes Lifestyle  Physical activity:  
  Days per week: Not on file Minutes per session: Not on file  Stress: Not on file Relationships  Social connections:  
  Talks on phone: Not on file Gets together: Not on file Attends Mu-ism service: Not on file Active member of club or organization: Not on file Attends meetings of clubs or organizations: Not on file Relationship status: Not on file  Intimate partner violence:  
  Fear of current or ex partner: Not on file Emotionally abused: Not on file Physically abused: Not on file Forced sexual activity: Not on file Other Topics Concern  Not on file Social History Narrative  Not on file ALLERGIES: Other food; Mustard; Oregano; Zithromax [azithromycin]; Pcn [penicillins]; and Watermelon Review of Systems Constitutional: Positive for appetite change (\"Lack of appetite\") and unexpected weight change. Negative for chills, diaphoresis and fever. HENT: Negative for rhinorrhea, sore throat and trouble swallowing. Eyes: Negative for photophobia. Respiratory: Positive for cough. Negative for shortness of breath. Cardiovascular: Negative for chest pain and palpitations. Gastrointestinal: Negative for abdominal pain, nausea and vomiting. Genitourinary: Negative for dysuria, frequency and hematuria. Musculoskeletal: Negative for arthralgias. Neurological: Negative for dizziness, syncope and weakness. Psychiatric/Behavioral: Negative for behavioral problems. The patient is not nervous/anxious. Vitals:  
 05/12/19 1108 Pulse: 80 SpO2: 92% Physical Exam  
Constitutional: He is oriented to person, place, and time. He appears well-developed and well-nourished. No distress. Pale appearing. Thin HENT:  
Head: Normocephalic and atraumatic. Eyes: Conjunctivae are normal. No scleral icterus. Neck: Neck supple. No tracheal deviation present. Cardiovascular: Normal rate, regular rhythm, normal heart sounds and intact distal pulses. Exam reveals no gallop and no friction rub. No murmur heard. Pulmonary/Chest: Effort normal.  
Bibasalar rales. Occasional wheezing. Abdominal: Soft. He exhibits no distension. There is no tenderness. There is no rebound and no guarding. Musculoskeletal: He exhibits no edema. Neurological: He is alert and oriented to person, place, and time. Skin: Skin is warm and dry. No rash noted. Multiple scars on chest and face due to skin cancer. Psychiatric: He has a normal mood and affect. Nursing note and vitals reviewed. Note written by Tosha Kapoor, as dictated by Lina Ledbetter MD 11:10 AM  
 
 
MDM Number of Diagnoses or Management Options Acute bronchitis, unspecified organism:  
  
Amount and/or Complexity of Data Reviewed Clinical lab tests: ordered and reviewed Tests in the radiology section of CPT®: ordered and reviewed Tests in the medicine section of CPT®: ordered and reviewed Discussion of test results with the performing providers: yes Decide to obtain previous medical records or to obtain history from someone other than the patient: yes Obtain history from someone other than the patient: yes Review and summarize past medical records: yes Discuss the patient with other providers: yes Independent visualization of images, tracings, or specimens: yes Procedures PROGRESS NOTE: 
2:38 PM 
Spoke with pulmonary associates about Patient. Will place on Levaquin. Will work up as out-patient for possible MAC.

## 2019-05-28 ENCOUNTER — PATIENT OUTREACH (OUTPATIENT)
Dept: INTERNAL MEDICINE CLINIC | Age: 84
End: 2019-05-28

## 2019-05-28 NOTE — PROGRESS NOTES
Received call from pt's wife, Viri Bills- verified with 2 identifiers. Reports that her  is complaining of constipation- has not had a bowel movement since Saturday. Yesterday she tried Fleets suppositories, 1 full bottle of Magnesium Citrate and a self made enema with warm tap water. She reports he had minimal stool after the enema. She reports that pt is uncomfortable intermittantly, c/o abd pain but no N/V/D, or fever/chills. Usually has hx of diarrhea intermittently, is not usually constipated. He usually goes once/day in the evening. Reviewed DH and she is agreeable to this suggestion. Reviewed that the NP/PA may still refer him to the ER if they feel he needs CT imaging. Called  and arranged appt- they will visit between 4:15- 5:15 pm today. Called back to pt's wife and she also confirmed appt time. Reminded her that if he is still having any issues overnight, to call our office at [phone] to get an appt with Dr. Emery Iraheta. And call 911 if symptoms worsen where he is having SOB, CP, AMS, V/D, Fever, Chills

## 2019-05-29 ENCOUNTER — TELEPHONE (OUTPATIENT)
Dept: INTERNAL MEDICINE CLINIC | Age: 84
End: 2019-05-29

## 2019-05-29 NOTE — TELEPHONE ENCOUNTER
Renea Gallardo () [phone]     Pt wants dr Doc Nguyen to know that [facility] came out to see her  yesterday and that he is doing so much better. Many thanks for recommending calling them.

## 2019-05-30 ENCOUNTER — PATIENT OUTREACH (OUTPATIENT)
Dept: INTERNAL MEDICINE CLINIC | Age: 84
End: 2019-05-30

## 2019-05-30 NOTE — TELEPHONE ENCOUNTER
Called pt's wife, Sebastian Naylor - verified with 2 identifiers. States that [facility] was \"Wonderful! \". They disimpacted MrJob Gann and recommended to drink another 1/2 bottle of Mag Citrate. Sebastian Naylor states that he is now [employer] \".  recommended for her to start him on Colace daily along with Metamucil. Pt has not started this yet, but she is going to pharmacy today. She was very appreciative of the recommendation to [facility].

## 2019-05-30 NOTE — PROGRESS NOTES
Goals        General     Relieve Constipation        5/30/2019  Called pt's wife, Devin Rasheed - verified with 2 identifiers. States that [facility] was \"Wonderful! \". They disimpacted Mr. Vivien King and recommended to drink another 1/2 bottle of Mag Citrate. Devin Rasheed states that he is now [employer] \".  recommended for her to start him on Colace daily along with Metamucil. Pt has not started this yet, but she is going to pharmacy today. She was very appreciative of the recommendation to [facility]. Reviewed ways to get fiber and fluids daily to help prevent constipation. Devin Rasheed is able to verbalize foods to help and foods to stay away from. No other needs at this time. Suzan Tilley RN, [facility]  Ambulatory Navigator, [facility]  5/28/19  [facility] Referral placed for pt with c/o constipation and decreased appetite  JACIEL Mackay RN, [facility]  Ambulatory Navigator, [facility]

## 2019-06-10 ENCOUNTER — HOSPITAL ENCOUNTER (OUTPATIENT)
Dept: LAB | Age: 84
Discharge: HOME | End: 2019-06-10
Payer: MEDICARE

## 2019-06-10 ENCOUNTER — OFFICE VISIT (OUTPATIENT)
Dept: INTERNAL MEDICINE CLINIC | Age: 84
End: 2019-06-10

## 2019-06-10 VITALS
SYSTOLIC BLOOD PRESSURE: 175 MMHG | HEART RATE: 55 BPM | WEIGHT: 159.38 LBS | HEIGHT: 70 IN | DIASTOLIC BLOOD PRESSURE: 68 MMHG | RESPIRATION RATE: 18 BRPM | TEMPERATURE: 98.2 F | BODY MASS INDEX: 22.82 KG/M2 | OXYGEN SATURATION: 99 %

## 2019-06-10 DIAGNOSIS — E03.4 HYPOTHYROIDISM DUE TO ACQUIRED ATROPHY OF THYROID: ICD-10-CM

## 2019-06-10 DIAGNOSIS — I26.99 OTHER PULMONARY EMBOLISM WITHOUT ACUTE COR PULMONALE, UNSPECIFIED CHRONICITY (HCC): ICD-10-CM

## 2019-06-10 DIAGNOSIS — C91.10 CLL (CHRONIC LYMPHOCYTIC LEUKEMIA) (HCC): ICD-10-CM

## 2019-06-10 DIAGNOSIS — E78.00 HYPERCHOLESTEROLEMIA: ICD-10-CM

## 2019-06-10 DIAGNOSIS — I10 ESSENTIAL HYPERTENSION: ICD-10-CM

## 2019-06-10 DIAGNOSIS — Z00.00 MEDICARE ANNUAL WELLNESS VISIT, SUBSEQUENT: Primary | ICD-10-CM

## 2019-06-10 DIAGNOSIS — K29.01 GASTROINTESTINAL HEMORRHAGE ASSOCIATED WITH ACUTE GASTRITIS: ICD-10-CM

## 2019-06-10 DIAGNOSIS — Z91.81 AT HIGH RISK FOR INJURY RELATED TO FALL: ICD-10-CM

## 2019-06-10 DIAGNOSIS — K22.5 ZENKER'S DIVERTICULUM: ICD-10-CM

## 2019-06-10 PROCEDURE — 84443 ASSAY THYROID STIM HORMONE: CPT

## 2019-06-10 PROCEDURE — 80061 LIPID PANEL: CPT

## 2019-06-10 PROCEDURE — 36415 COLL VENOUS BLD VENIPUNCTURE: CPT

## 2019-06-10 NOTE — PATIENT INSTRUCTIONS
Medicare Wellness Visit, Male The best way to live healthy is to have a lifestyle where you eat a well-balanced diet, exercise regularly, limit alcohol use, and quit all forms of tobacco/nicotine, if applicable. Regular preventive services are another way to keep healthy. Preventive services (vaccines, screening tests, monitoring & exams) can help personalize your care plan, which helps you manage your own care. Screening tests can find health problems at the earliest stages, when they are easiest to treat. [facility] follows the current, evidence-based guidelines published by the [facility] (RUSTSTF) when recommending preventive services for our patients. Because we follow these guidelines, sometimes recommendations change over time as research supports it. (For example, a prostate screening blood test is no longer routinely recommended for men with no symptoms.) Of course, you and your doctor may decide to screen more often for some diseases, based on your risk and co-morbidities (chronic disease you are already diagnosed with). Preventive services for you include: - Medicare offers their members a free annual wellness visit, which is time for you and your primary care provider to discuss and plan for your preventive service needs. Take advantage of this benefit every year! 
-All adults over age 72 should receive the recommended pneumonia vaccines. Current USPSTF guidelines recommend a series of two vaccines for the best pneumonia protection.  
-All adults should have a flu vaccine yearly and an ECG.  All adults age 61 and older should receive a shingles vaccine once in their lifetime.   
-All adults age 38-68 who are overweight should have a diabetes screening test once every three years.  
-Other screening tests & preventive services for persons with diabetes include: an eye exam to screen for diabetic retinopathy, a kidney function test, a foot exam, and stricter control over your cholesterol.  
-Cardiovascular screening for adults with routine risk involves an electrocardiogram (ECG) at intervals determined by the provider.  
-Colorectal cancer screening should be done for adults age 54-65 with no increased risk factors for colorectal cancer. There are a number of acceptable methods of screening for this type of cancer. Each test has its own benefits and drawbacks. Discuss with your provider what is most appropriate for you during your annual wellness visit. The different tests include: colonoscopy (considered the best screening method), a fecal occult blood test, a fecal DNA test, and sigmoidoscopy. 
-All adults [place] between [facility] should be screened once for Hepatitis C. 
-An Abdominal Aortic Aneurysm (AAA) Screening is recommended for men age 73-68 who has ever smoked in their lifetime. Here is a list of your current Health Maintenance items (your personalized list of preventive services) with a due date: 
Health Maintenance Due Topic Date Due  Shingles Vaccine (1 of 2) 03/17/1981  Glaucoma Screening   06/03/2019 [address] [place] [address] Annual Well Visit  06/09/2019

## 2019-06-10 NOTE — PROGRESS NOTES
This is the Subsequent Medicare Annual Wellness Exam, performed 12 months or more after the Initial AWV or the last Subsequent AWV I have reviewed the patient's medical history in detail and updated the computerized patient record. As well as a follow-up for his health issues. He is recently had? Aspiration pneumonia treated with Levaquin and is doing much better. He is doing speech therapy and that seems to be helping. He has gained about 5 pounds and his wife reports he is eating better and trying to do a soft diet. His cough is much improved. No sputum production. No fevers or chills. No nausea or vomiting. Bowel movements and bladder function are stable. He is more unsteady on his feet and his wife wondered about some physical therapy for that. He does continue to see the oncologist for follow-up of his CLL. He is seeing dermatology on a regular basis for his previous multiple skin cancers. History Past Medical History:  
Diagnosis Date  Asthma   
 outgrew this per pt  Autoimmune disease ([facility] [address] [address] CLL  Cancer ([facility] [address] 6/20/14 MULTIPLE SCCAs EXCISED, ALSO BCCAs  CLL (chronic lymphocytic leukemia) ([address] 2 infusions/states he no longer has this  GERD (gastroesophageal reflux disease)  Hypercholesterolemia  Hypertension  Liver disease 6/6/2014 ADMITTED FOR LIVER DYSFUNCTION, NOT DIAGNOSED, NOW RESOLVED  Pulmonary embolism ([facility] [address] 9/2015  Thyroid disease  Unspecified adverse effect of anesthesia \"out of it for 2 weeks\"  Unspecified sleep apnea   
 had sleep study but no follow up Past Surgical History:  
Procedure Laterality Date  HX ABDOMINAL LAPAROSCOPY  4/2016  HX COLONOSCOPY X2, MOST RECNT WAS IN 2013  HX HEENT    
 tonsillectomy  HX HERNIA REPAIR  4/13/16  
 paraesophageal hernia repair Dr Olive Aguirre  HX ORTHOPAEDIC Left Frozen shoulder manipulation  HX OTHER SURGICAL    
 moh's procedures - face  REVISION OF LOWER EYELID Current Outpatient Medications Medication Sig Dispense Refill  levothyroxine (SYNTHROID) 150 mcg tablet TAKE 1 TABLET BY MOUTH ONCE DAILY BEFORE BREAKFAST . DOSE  ADJUSTMENT 85 Tab 2  
 aspirin 81 mg chewable tablet Take 81 mg by mouth daily.  omeprazole (PRILOSEC) 40 mg capsule Take 40 mg by mouth daily.  Lactobacillus acidophilus (PROBIOTIC PO) Take 1 Cap by mouth daily.  amLODIPine (NORVASC) 10 mg tablet TAKE ONE TABLET BY MOUTH ONCE DAILY 90 Tab 4  pravastatin (PRAVACHOL) 20 mg tablet TAKE ONE TABLET BY MOUTH NIGHTLY 90 Tab 1  
 hydroCHLOROthiazide (HYDRODIURIL) 25 mg tablet TAKE ONE TABLET BY MOUTH ONCE DAILY 90 Tab 1  
 OTHER Combination of Emu oil and pure Hemp Oil extract. 4 drops per day. Theramu.  cyanocobalamin (VITAMIN B-12) 1,000 mcg sublingual tablet Take 1,000 mcg by mouth daily.  co-enzyme Q-10 (CO Q-10) 100 mg capsule Take 100 mg by mouth daily.  MEN'S MULTI-VITAMIN PO Take 2 Each by mouth daily. Chews 2 gummies po once daily.  cholecalciferol, vitamin D3, (VITAMIN D3) 2,000 unit tab Take 1 tablet by mouth daily. Allergies Allergen Reactions  Other Food Other (comments) AVOIDS OTHER SPICES IN GENERAL DUE SEVERE REACTIONS TO OREGANO AND MUSTARD, joann pineda  Mustard Anaphylaxis THROAT CLOSES  
 Oregano Anaphylaxis THROAT CLOSES  Zithromax [Azithromycin] Anaphylaxis  Pcn [Penicillins] Unable to [employer] Unsure of reaction.  Watermelon Swelling Lips swelling Family History Problem Relation Age of Onset  Heart Disease Mother  Heart Failure Mother  Heart Disease Father  Parkinson's Disease Father  Pneumonia Father  Cancer Sister Lung CA??? - SMOKER  
 Heart Disease Sister  Anesth Problems Neg Hx Social History Tobacco Use  Smoking status: Never Smoker  Smokeless tobacco: Never Used Substance Use Topics  Alcohol use: Yes Comment: 1 oz per day. Patient Active Problem List  
Diagnosis Code  CLL (chronic lymphocytic leukemia) ([facility]) C91.90  Hypercholesterolemia E78.00  Hypertension I10  
 Hypothyroidism E03.9  Pulmonary embolism ([facility]) I26.99  
 GI bleed K92.2  
 S/P IVC filter A28.762  Advanced care planning/counseling discussion Z71.89  Zenker's diverticulum K22.5 ROS - Per HPI Physical Examination: General appearance - alert, well appearing, and in no distress Ears - bilateral TM's and external ear canals normal 
Nose - normal and patent, no erythema, discharge or polyps Mouth - mucous membranes moist, pharynx normal without lesions Neck - supple, no significant adenopathy Lymphatics - no palpable lymphadenopathy, no hepatosplenomegaly Chest - clear to auscultation, no wheezes, rales or rhonchi, symmetric air entry Heart - normal rate and regular rhythm Abdomen - soft, nontender, nondistended, no masses or organomegaly Back exam - full range of motion, no tenderness, palpable spasm or pain on motion Neurological - alert, oriented, normal speech, no focal findings or movement disorder noted, motor and sensory grossly normal bilaterally Musculoskeletal - no joint tenderness, deformity or swelling Extremities - peripheral pulses normal, no pedal edema, no clubbing or cyanosis Depression Risk Factor Screening:  
 
3 most recent PHQ Screens 11/30/2018 Little interest or pleasure in doing things Not at all Feeling down, depressed, irritable, or hopeless Not at all Total Score PHQ 2 0 Alcohol Risk Factor Screening: You do not drink alcohol or very rarely. Functional Ability and Level of Safety:  
Hearing Loss The patient wears hearing aids. Activities of Daily Living The home contains: no safety equipment. Patient does total self care Fall Risk Fall Risk Assessment, last 12 mths 1/11/2019 Able to walk? Yes Fall in past 12 months?  No  
 
 
 Abuse Screen Patient is not abused Cognitive Screening Evaluation of Cognitive Function: 
Has your family/caregiver stated any concerns about your memory: no 
 
 
Patient Care Team  
Patient Care Team: 
Angelo Neal MD as PCP - General (Internal Medicine) Mynor Nassar MD (Ophthalmology) Douglas Ramsay MD (Dermatology) Rufus Curtis MD as Surgeon (General Surgery) Assessment/Plan Education and counseling provided: 
Are appropriate based on today's review and evaluation End-of-Life planning (with patient's consent) Diabetes screening test 
 
Diagnoses and all orders for this visit: 
 
1. Hypercholesterolemia-controlled on statins. Check labs to be sure his LDL is at goal of less than 100. 
-     LIPID PANEL 2. CLL (chronic lymphocytic leukemia) (HCC)-Per oncology. 3. Hypothyroidism due to acquired atrophy of thyroid-appears euthyroid. Check levels and adjust as needed. -     TSH AND FREE T4 
 
4. Zenker's diverticulum-per GI. Continue speech therapy for now. 5. Gastrointestinal hemorrhage associated with acute gastritis-no recurrence. 6. Essential hypertension-blood pressure well controlled past.  We will continue to monitor at home. 7. Other pulmonary embolism without acute cor pulmonale, unspecified chronicity (HCC)-no recurrence. 8. At high risk for injury related to fall -will order physical therapy to help with strengthening and see if this is helpful. 
-     REFERRAL TO PHYSICAL THERAPY 9. Medicare annual wellness visit, subsequent Health Maintenance Due Topic Date Due  Shingrix Vaccine Age 50> (1 of 2) 03/17/1981  GLAUCOMA SCREENING Q2Y  06/03/2019  MEDICARE YEARLY EXAM  06/09/2019

## 2019-06-11 LAB
CHOLEST SERPL-MCNC: 176 MG/DL (ref 100–199)
HDLC SERPL-MCNC: 59 MG/DL
LDLC SERPL CALC-MCNC: 93 MG/DL (ref 0–99)
T4 FREE SERPL-MCNC: 0.86 NG/DL (ref 0.82–1.77)
TRIGL SERPL-MCNC: 122 MG/DL (ref 0–149)
TSH SERPL DL<=0.005 MIU/L-ACNC: 9.79 UIU/ML (ref 0.45–4.5)
VLDLC SERPL CALC-MCNC: 24 MG/DL (ref 5–40)

## 2019-06-12 ENCOUNTER — TELEPHONE (OUTPATIENT)
Dept: INTERNAL MEDICINE CLINIC | Age: 84
End: 2019-06-12

## 2019-06-12 RX ORDER — LEVOTHYROXINE SODIUM 175 UG/1
175 TABLET ORAL
Qty: 90 TAB | Refills: 0 | Status: SHIPPED | OUTPATIENT
Start: 2019-06-12 | End: 2020-04-27 | Stop reason: SDUPTHER

## 2019-06-12 NOTE — TELEPHONE ENCOUNTER
----- Message from Omar Llanos MD sent at 6/11/2019  8:58 AM EDT -----  Increase synthroid to 175. Labs in 3 months and send copy to Dr. Winter Collazo.

## 2019-06-12 NOTE — TELEPHONE ENCOUNTER
Spoke with pt and spouse in ref to lab results. TSH not at goal. Per SRJ, will increase levothyroxine to 175 mcg daily and repeat labs in 3 months. Rx for new dose sent to pharmacy. All other labs stable or at goal. Verbalized understanding. Orders Placed This Encounter    levothyroxine (SYNTHROID) 175 mcg tablet     Sig: Take 1 Tab by mouth Daily (before breakfast). Dose adjustment 6/12     Dispense:  90 Tab     Refill:  0     The above orders were approved via [facility] per Dr. Ty Rinne, III.

## 2019-07-09 ENCOUNTER — HOSPITAL ENCOUNTER (OUTPATIENT)
Dept: PHYSICAL THERAPY | Age: 84
Discharge: HOME | End: 2019-07-09
Payer: MEDICARE

## 2019-07-09 PROCEDURE — 97161 PT EVAL LOW COMPLEX 20 MIN: CPT | Performed by: PHYSICAL THERAPIST

## 2019-07-09 PROCEDURE — 97110 THERAPEUTIC EXERCISES: CPT | Performed by: PHYSICAL THERAPIST

## 2019-07-09 NOTE — PROGRESS NOTES
PT INITIAL EVALUATION NOTE [address] Physical Therapy and Sports Medicine  [address], [place], [address]  Phone: [phone]  Fax: [phone]    Patient Name: Douglas Tucker  Date:2019  : 3/17/1931  [x]  Patient  Verified  Payor: Ben Mean / Plan: [place] MED[place] PART [place] & B / Product Type: Medicare /     In time: 105 PM  Out time:140 PM  Total Treatment Time (min): 35  Total Timed Codes (min): 15  1:1 Treatment Time (min): 35  Visit #: 1     Treatment Area: Other abnormalities of gait and mobility [R26.89]    SUBJECTIVE  Any medication changes, allergies to medications, adverse drug reactions, diagnosis change, or new procedure performed?: [] No    [x] Yes (see summary sheet for update)    Current symptoms/chief complaint:   Pt presents with referral for fall risk. Pt's daughter and wife present for evaluation and voiced concern about pt's balance and exercise routine. Pt has been seen in this clinic multiple times for gait/balance. [place] [address] [place] wife. No AD. He reports that he feels steady on his feet- \"for the most part\". He has lost a lot of weight recently due to aspiration, dysphagia. Denies falls. Denies furniture walking at home. Has membership at [facility] however they do not go    Date of onset/injury: chronic    History of falls?: No    Location of symptoms: gait/balance/generalized weakness     Pain Level (0-10 scale): 0/10. No pain. Previous treatment: PT in , April/May 2018     PMH: Significant for hernia surgery 2016; cancer      Social/Recreation/Work: Retired . Pt tries to be active around the house     Prior level of function: Improved activity tolerance; LE strength; balance     Patient goal(s): to be stronger, to have better balance.  To get in regular exercise routine      OBJECTIVE:    Posture/Observation: fair sitting/standing posture  Pt ambulates independently with good albin, minimal postural sway    Stairs:  Pt ascended/descended 4 steps using bilat handrails, reciprocal gait pattern     Strength (1-5)                  Right Left   Hip flexion 4 4   Knee ext 4+ 4+   Knee flex 4+ 4+   Ankle DF 5 5           SLR At least 3/5 At least 3/5   Hip abd 4 4     Pt able to complete 100% of supine bridge     SLS on R: 3 sec. Mod to max postural sway  SLS on L: 4 sec. Mod to max postural sway     Tandem stance:  R in front: >10 sec. Mod to min postural sway  L in front: >10 sec. Mod to min postural sway     Modified CTSIB:  Condition 1) 30 sec. No postural sway  Condition 2) 30 sec. Mod postural sway  Condition 3) 30 sec.  Mod postural sway  Condition 4) 4 sec, inc'd post postural sway      15 min Therapeutic Exercise:  [x] See flow sheet : instructed in HEP   Rationale: increase ROM, increase strength, improve coordination, improve balance and increase proprioception to improve the patients ability to perform daily chores with improved balance            With   [x] TE   [] TA   [] neuro   [] other: Patient Education: [x] Review HEP    [] Progressed/Changed HEP based on:   [] positioning   [] body mechanics   [] transfers   [] heat/ice application    [x] other: importance of performing HEP in order to maximize benefit of PT; extensively spoke with pt regarding importance of going to the gym 1-2x/wk at using recumbent bike and seated weight machines      Pain Level (0-10 scale) post treatment: 0    ASSESSMENT:  See Assessment     [x]  See Plan of [address], PT DPT 7/9/2019  1:05 PM

## 2019-07-09 NOTE — PROGRESS NOTES
[facility] Physical Therapy  [address]  [place], [address]  Phone: [phone]  Fax: [phone]    Plan of Care/Statement of Necessity for Physical Therapy Services  2-15    Patient name: Chika Fleming  : 3/17/1931  Provider#: [id number]  Referral source: Omari Donohue MD      Medical/Treatment Diagnosis: Other abnormalities of gait and mobility [R26.89]     Prior Hospitalization: see medical history     Comorbidities: see evaluation  Prior Level of Function: see evaluation  Medications: Verified on Patient Summary List  Start of Care: 19      Onset Date: chronic   The Plan of Care and following information is based on the information from the initial evaluation. Assessment/ key information: Pt is an 80year old male presenting with LE weakness and balance dysfunction. He will benefit from PT to address problem list below.     Problem List:decrease strength, impaired gait/ balance, decrease ADL/ functional abilitiies, decrease activity tolerance and decrease flexibility/ joint mobility              Treatment Plan may include any combination of the following: Therapeutic exercise, Therapeutic activities, Neuromuscular re-education, Physical agent/modality, Gait/balance training, Manual therapy, Patient education, Self Care training, Functional mobility training and Home safety training  Patient / Family readiness to learn indicated by: asking questions, trying to perform skills and interest  Persons(s) to be included in education: patient (P)  Barriers to Learning/Limitations: None  Patient Goal (s): see evalution  Patient Self Reported Health Status: good  Rehabilitation Potential: good     Long Term Goals:  To be accomplished in 4-6 weeks:  1) Pt will be independent in HEP  2) Pt will report going to the gym at least 1x/wk and knowing appropriate exercises to perform  3) Pt will be able to perform condition 4 of the modCTSIB for > for =10 sec in order to demonstrate improvement in balance  4) Pt will perform SLS for >/=5 sec on R and L LE in order to demonstrate improvement in balance      Frequency / Duration: Patient to be seen 1-2 times per week for 4-6 weeks.     Patient/ Caregiver education and instruction: self care, activity modification and exercises    [x]  Plan of care has been reviewed with PTA      Certification Period: 7/9/18-10/1/19  Salo Schroeder, PT 7/9/2019    ________________________________________________________________________    I certify that the above Therapy Services are being furnished while the patient is under my care. I agree with the treatment plan and certify that this therapy is necessary.     [facility] Signature:____________________  Date:____________Time: _________

## 2019-07-16 ENCOUNTER — HOSPITAL ENCOUNTER (OUTPATIENT)
Dept: PHYSICAL THERAPY | Age: 84
Discharge: HOME | End: 2019-07-16
Payer: MEDICARE

## 2019-07-16 PROCEDURE — 97112 NEUROMUSCULAR REEDUCATION: CPT | Performed by: PHYSICAL THERAPIST

## 2019-07-16 PROCEDURE — 97110 THERAPEUTIC EXERCISES: CPT | Performed by: PHYSICAL THERAPIST

## 2019-07-16 NOTE — PROGRESS NOTES
PT DAILY TREATMENT NOTE - [facility] 2-15    Patient Name: Douglas Tucker  Date:2019  : 3/17/1931  [x]  Patient  Verified  Payor: Ben Mean / Plan: [place] MEDICARE PART A & B / Product Type: Medicare /    In time: 1030 A  Out time:1120 A  Total Treatment Time (min): 50  Total Timed Codes (min): 50  1:1 Treatment Time ( [address] only): 40   Visit #:  2    Treatment Area: Other abnormalities of gait and mobility [R26.89]    SUBJECTIVE  Pain Level (0-10 scale): 0  Any medication changes, allergies to medications, adverse drug reactions, diagnosis change, or new procedure performed?: [x] No    [] Yes (see summary sheet for update)  Subjective functional status/changes:   [] No changes reported  Pt went to the gym with his wife 1x in the past week-- did the NuStep machine for 30 min.  Good compliance with HEP    OBJECTIVE      30 min Therapeutic Exercise:  [x] See flow sheet : Rec elliptical, bridges, SLR, standing marches/hip abduction/HR, sit-->stand, gastroc stretch at incline board   Rationale: increase ROM, increase strength, improve coordination, improve balance and increase proprioception to improve the patients ability to perform ADLs, daily chores with dec'd symptoms     20 min Neuromuscular Re-education:  [x]  See flow sheet : Rockerboard to challenged balance, romberg stance on foam with EO, mod tandem stance on foam, SLS, retro walking and sidestepping in gym with gait belt   Rationale: increase ROM, increase strength, improve coordination, improve balance and increase proprioception  to improve the patients ability to perform ADLs, daily chores with dec'd symptoms            With   [] TE   [] TA   [] neuro   [] other: Patient Education: [x] Review HEP    [] Progressed/Changed HEP based on:   [] positioning   [] body mechanics   [] transfers   [] heat/ice application    [] other:      Other Objective/Functional Measures: nt     Pain Level (0-10 scale) post treatment: 0    ASSESSMENT/Changes in Function: Fatigued at end of visit. Overall rick therapy session well    Patient will continue to benefit from skilled PT services to modify and progress therapeutic interventions, address functional mobility deficits, address strength deficits, analyze and cue movement patterns, analyze and modify body mechanics/ergonomics, assess and modify postural abnormalities and address imbalance/dizziness to attain remaining goals.      [x]  See Plan of Care  []  See progress note/recertification  []  See Discharge Summary         Progress towards goals / Updated goals:  n/a    PLAN  []  Upgrade activities as tolerated     [x]  Continue plan of care  []  Update interventions per flow sheet       []  Discharge due to:_  []  Other:_      [place], PT 7/16/2019

## 2019-07-23 ENCOUNTER — HOSPITAL ENCOUNTER (OUTPATIENT)
Dept: PHYSICAL THERAPY | Age: 84
Discharge: HOME | End: 2019-07-23
Payer: MEDICARE

## 2019-07-23 PROCEDURE — 97112 NEUROMUSCULAR REEDUCATION: CPT

## 2019-07-23 NOTE — PROGRESS NOTES
PT DAILY TREATMENT NOTE - [facility] 2-15    Patient Name: Siobhan Whaley  Date:2019  : 3/17/1931  [x]  Patient  Verified  Payor: Dheeraj Murillo / Plan: [place] MEDICARE PART A & B / Product Type: Medicare /    In time: 10:30A  Out time: 11:30A  Total Treatment Time (min): 60  Total Timed Codes (min): 60  1:1 Treatment Time ( [address] only): 30  Visit #:  3    Treatment Area: Other abnormalities of gait and mobility [R26.89]    SUBJECTIVE  Pain Level (0-10 scale): 0/10  Any medication changes, allergies to medications, adverse drug reactions, diagnosis change, or new procedure performed?: [x] No    [] Yes (see summary sheet for update)  Subjective functional status/changes:   [] No changes reported  Pt reports doing well. OBJECTIVE      30 min Therapeutic Exercise:  [x] See flow sheet : Rec elliptical, bridges, SLR, standing marches/hip abduction/HR, sit-->stand, gastroc stretch at incline board   Rationale: increase ROM, increase strength, improve coordination, improve balance and increase proprioception to improve the patients ability to perform ADLs, daily chores with dec'd symptoms     30 min Neuromuscular Re-education:  [x]  See flow sheet : Rockerboard to challenged balance, romberg stance on foam with EO, mod tandem stance on foam, SLS, retro walking and sidestepping in gym with gait belt   Rationale: increase ROM, increase strength, improve coordination, improve balance and increase proprioception  to improve the patients ability to perform ADLs, daily chores with dec'd symptoms            With   [] TE   [] TA   [] neuro   [] other: Patient Education: [x] Review HEP    [] Progressed/Changed HEP based on:   [] positioning   [] body mechanics   [] transfers   [] heat/ice application    [] other:      Other Objective/Functional Measures: --    Pain Level (0-10 scale) post treatment: 0/10    ASSESSMENT/Changes in Function:   Pt required CGA-MIN A for keeping hips forward with grapevine walking.  Required some UE assist with balance activities at //bars. No increase in pain with today's session. Patient will continue to benefit from skilled PT services to modify and progress therapeutic interventions, address functional mobility deficits, address strength deficits, analyze and cue movement patterns, analyze and modify body mechanics/ergonomics, assess and modify postural abnormalities and address imbalance/dizziness to attain remaining goals.      [x]  See Plan of Care  []  See progress note/recertification  []  See Discharge Summary         Progress towards goals / Updated goals:  n/a    PLAN  [x]  Upgrade activities as tolerated     [x]  Continue plan of care  []  Update interventions per flow sheet       []  Discharge due to:_  []  Other:_      Ferny Johnston, YARELIS, OPT[facility] 7/23/2019

## 2019-07-30 ENCOUNTER — HOSPITAL ENCOUNTER (OUTPATIENT)
Dept: PHYSICAL THERAPY | Age: 84
Discharge: HOME | End: 2019-07-30
Payer: MEDICARE

## 2019-07-30 PROCEDURE — 97110 THERAPEUTIC EXERCISES: CPT | Performed by: PHYSICAL THERAPIST

## 2019-07-30 PROCEDURE — 97112 NEUROMUSCULAR REEDUCATION: CPT | Performed by: PHYSICAL THERAPIST

## 2019-07-30 NOTE — PROGRESS NOTES
PT DAILY TREATMENT NOTE - [facility] 2-15    Patient Name: Megan Fitzgerald  Date:2019  : 3/17/1931  [x]  Patient  Verified  Payor: [employer] / Plan: [place] MEDICARE PART A & B / Product Type: Medicare /    In time: 10:30A  Out time: 11:20A  Total Treatment Time (min): 50  Total Timed Codes (min): 50  1:1 Treatment Time ( [address] only): 40  Visit #:  4    Treatment Area:  Other abnormalities of gait and mobility [R26.89]    SUBJECTIVE  Pain Level (0-10 scale): 0/10  Any medication changes, allergies to medications, adverse drug reactions, diagnosis change, or new procedure performed?: [x] No    [] Yes (see summary sheet for update)  Subjective functional status/changes:   [x] No changes reported    OBJECTIVE      20 min Therapeutic Exercise:  [x] See flow sheet : Rec elliptical, bridges, SLR, standing marches/hip abduction/HR, sit-->stand, gastroc stretch at incline board, fwd/lat step ups   Rationale: increase ROM, increase strength, improve coordination, improve balance and increase proprioception to improve the patients ability to perform ADLs, daily chores with dec'd symptoms     30 min Neuromuscular Re-education:  [x]  See flow sheet : Rockerboard to challenged balance, romberg stance on foam with EO, mod tandem stance on foam, SLS, retro walking and sidestepping in gym with gait belt; ant crossover walking, alt cone taps, 4 corner stepping on blue mat   Rationale: increase ROM, increase strength, improve coordination, improve balance and increase proprioception  to improve the patients ability to perform ADLs, daily chores with dec'd symptoms            With   [] TE   [] TA   [] neuro   [] other: Patient Education: [x] Review HEP    [] Progressed/Changed HEP based on:   [] positioning   [] body mechanics   [] transfers   [] heat/ice application    [] other:      Other Objective/Functional Measures: --    Pain Level (0-10 scale) post treatment: 0/10    ASSESSMENT/Changes in Function:   Pt challenged with alternating cone taps as well as 4 corner stepping on blue mat. Patient will continue to benefit from skilled PT services to modify and progress therapeutic interventions, address functional mobility deficits, address strength deficits, analyze and cue movement patterns, analyze and modify body mechanics/ergonomics, assess and modify postural abnormalities and address imbalance/dizziness to attain remaining goals.      [x]  See Plan of Care  []  See progress note/recertification  []  See Discharge Summary         Progress towards goals / Updated goals:  n/a    PLAN  [x]  Upgrade activities as tolerated     [x]  Continue plan of care  []  Update interventions per flow sheet       []  Discharge due to:_  []  Other:_      Ai Elmore, PT, DPT 7/30/2019

## 2019-08-06 ENCOUNTER — HOSPITAL ENCOUNTER (OUTPATIENT)
Dept: PHYSICAL THERAPY | Age: 84
Discharge: HOME | End: 2019-08-06
Payer: MEDICARE

## 2019-08-06 DIAGNOSIS — E78.00 HYPERCHOLESTEROLEMIA: ICD-10-CM

## 2019-08-06 PROCEDURE — 97110 THERAPEUTIC EXERCISES: CPT | Performed by: PHYSICAL THERAPIST

## 2019-08-06 RX ORDER — PRAVASTATIN SODIUM 20 MG/1
TABLET ORAL
Qty: 90 TAB | Refills: 1 | Status: SHIPPED | OUTPATIENT
Start: 2019-08-06 | End: 2020-06-10 | Stop reason: ALTCHOICE

## 2019-08-06 NOTE — ANCILLARY DISCHARGE INSTRUCTIONS
[facility]  [address]  [place], [address]  Phone: [phone]  Fax: [phone]    Discharge Summary  2-15    Patient name: Patrick Correa  : 3/17/1931  Provider#:[id number]  Referral source: Julio Leiva MD      Medical/Treatment Diagnosis: Other abnormalities of gait and mobility [R26.89]     Prior Hospitalization: see medical history     Comorbidities: see evaluation  Prior Level of Function:see evaluation  Medications: Verified on Patient Summary List    Start of Care: 19      Onset Date:see evaluation   Visits from Start of Care: 5     Missed Visits: see CC  Reporting Period : 19 to 19    Summary of care:   Strength (1-5)                      Right Left   Hip flexion 4+ 4+   Knee ext 4+ 4+   Knee flex 4+ 4+   Ankle DF 5 5           SLR At least 3/5 At least 3/5   Hip abd 4 4      Pt able to complete 100% of supine bridge     SLS on R: 5 sec. Mod to max postural sway  SLS on L: 5 sec. Mod to max postural sway     Tandem stance:  R in front: >10 sec. Mod to min postural sway  L in front: >10 sec. Mod to min postural sway     Modified CTSIB:  Condition 1) 30 sec. No postural sway  Condition 2) 30 sec. Mod postural sway  Condition 3) 30 sec. Mod postural sway  Condition 4) 11 sec, inc'd post postural sway    Goals:  1) Pt will be independent in HEP MET  2) Pt will report going to the gym at least 1x/wk and knowing appropriate exercises to perform not met-- has not consistently been going to the gym despite PT recommendation. 3) Pt will be able to perform condition 4 of the modCTSIB for > for =10 sec in order to demonstrate improvement in balance MET  4) Pt will perform SLS for >/=5 sec on R and L LE in order to demonstrate improvement in balance  MET     ASSESSMENT  Pt has completed 5 skilled PT visits. He has met 3/4 PT goals. He demonstrates improvement in balance testing and reports compliance with HEP.  Pt states that he feels safe at home and does not have fear of falling. He and his wife have not been consistently going to gym, despite PT recommendation. Reviewed HEP today. Pt ready for D/C from PT.           RECOMMENDATIONS:  [x]Discontinue therapy: [x]Patient has reached or is progressing toward set goals      []Patient is non-compliant or has abdicated      []Due to lack of appreciable progress towards set [address], PT 8/6/2019 12:18 PM

## 2019-08-06 NOTE — PROGRESS NOTES
PT DAILY TREATMENT/PRogress NOTE - [facility] 2-15    Patient Name: Siobhan Whaley  Date:2019  : 3/17/1931  [x]  Patient  Verified  Payor: [place] MEDICARE / Plan: [place] MEDICARE PART A & B / Product Type: Medicare /    In time: 10:30A  Out time: 11:25 A  Total Treatment Time (min): 55  Total Timed Codes (min): 55  1:1 Treatment Time ( [address] only): 40  Visit #:  5    Treatment Area: Other abnormalities of gait and mobility [R26.89]    SUBJECTIVE  Pain Level (0-10 scale): 0/10  Any medication changes, allergies to medications, adverse drug reactions, diagnosis change, or new procedure performed?: [x] No    [] Yes (see summary sheet for update)  Subjective functional status/changes:   [] No changes reported  Pt states that he feels he is doing well. Has been compliant with HEP. He and his wife have been to the gym a couple times, he knows they should go more regularly. OBJECTIVE      55 min Therapeutic Exercise:  [x] See flow sheet : Measurements taken for progress note.  Reviewed HEP   Rationale: increase ROM, increase strength, improve coordination, improve balance and increase proprioception to improve the patients ability to perform ADLs, daily chores with dec'd symptoms     0 min HELD-- Neuromuscular Re-education:  [x]  See flow sheet : Rockerboard to challenged balance, romberg stance on foam with EO, mod tandem stance on foam, SLS, retro walking and sidestepping in gym with gait belt; ant crossover walking, alt cone taps, 4 corner stepping on blue mat   Rationale: increase ROM, increase strength, improve coordination, improve balance and increase proprioception  to improve the patients ability to perform ADLs, daily chores with dec'd symptoms            With   [] TE   [] TA   [] neuro   [] other: Patient Education: [x] Review HEP    [] Progressed/Changed HEP based on:   [] positioning   [] body mechanics   [] transfers   [] heat/ice application    [] other:      Other Objective/Functional Measures: Strength (1-5)                      Right Left   Hip flexion 4+ 4+   Knee ext 4+ 4+   Knee flex 4+ 4+   Ankle DF 5 5           SLR At least 3/5 At least 3/5   Hip abd 4 4      Pt able to complete 100% of supine bridge     SLS on R: 5 sec. Mod to max postural sway  SLS on L: 5 sec. Mod to max postural sway     Tandem stance:  R in front: >10 sec. Mod to min postural sway  L in front: >10 sec. Mod to min postural sway     Modified CTSIB:  Condition 1) 30 sec. No postural sway  Condition 2) 30 sec. Mod postural sway  Condition 3) 30 sec. Mod postural sway  Condition 4) 11 sec, inc'd post postural sway      Pain Level (0-10 scale) post treatment: 0/10    ASSESSMENT/Changes in Function:   Pt has completed 5 skilled PT visits. He has met 3/4 PT goals. He demonstrates improvement in balance testing and reports compliance with HEP. Pt states that he feels safe at home and does not have fear of falling. He and his wife have not been consistently going to gym, despite PT recommendation. Reviewed HEP today. Pt ready for D/C from PT.      []  See Plan of Care  []  See progress note/recertification  [x]  See Discharge Summary         Progress towards goals / Updated goals:  Long Term Goals: To be accomplished in 4-6 weeks:  1) Pt will be independent in HEP MET  2) Pt will report going to the gym at least 1x/wk and knowing appropriate exercises to perform not met-- has not consistently been going to the gym despite PT recommendation. 3) Pt will be able to perform condition 4 of the modCTSIB for > for =10 sec in order to demonstrate improvement in balance MET  4) Pt will perform SLS for >/=5 sec on R and L LE in order to demonstrate improvement in balance  MET    PLAN     [x]  Discharge due to: pt met or progressing toward PT goals.       Jose Thomas, PT, DPT 8/6/2019

## 2019-08-07 ENCOUNTER — TELEPHONE (OUTPATIENT)
Dept: INTERNAL MEDICINE CLINIC | Age: 84
End: 2019-08-07

## 2019-09-19 DIAGNOSIS — I10 ESSENTIAL HYPERTENSION WITH GOAL BLOOD PRESSURE LESS THAN 140/90: ICD-10-CM

## 2019-09-19 RX ORDER — HYDROCHLOROTHIAZIDE 25 MG/1
TABLET ORAL
Qty: 90 TAB | Refills: 1 | Status: SHIPPED | OUTPATIENT
Start: 2019-09-19 | End: 2020-04-09

## 2019-12-12 ENCOUNTER — HOSPITAL ENCOUNTER (OUTPATIENT)
Dept: GENERAL RADIOLOGY | Age: 84
Discharge: HOME | End: 2019-12-12
Attending: INTERNAL MEDICINE
Payer: MEDICARE

## 2019-12-12 DIAGNOSIS — R13.10 DYSPHAGIA: ICD-10-CM

## 2019-12-12 PROCEDURE — 74230 X-RAY XM SWLNG FUNCJ C+: CPT

## 2019-12-12 PROCEDURE — 92611 MOTION FLUOROSCOPY/SWALLOW: CPT

## 2019-12-12 NOTE — PROGRESS NOTES
[address] [place] [address]    Speech Pathology Modified barium swallow Study  Patient: Leopoldo Fridge (88 y.o. male)  Date: 12/12/2019  Referring Provider:  Dr. Mitzi Bonilla:   Patient accompanied to study by his wife who served as the primary historian. Patient has had at least 2 prior MBS studies in 2017. On his first study in 8/2017, patient silently aspirated thin liquids and nectar thick liquids were recommended. He was noncompliant with thickened liquids. He worked with outpatient speech therapist and had a repeat mbs in 10/2017 and patient with no further aspiration. Wife reports that OP SLP had recommended continued home swallow exercises; however, patient refuses to complete. Patient denies any trouble swallowing; however, wife reports occasional coughing and suspects patient is aspirating. Patient and wife report no SLP services have been received since ~10/ 2017 despite MBS order from an outpatient SLP.      OBJECTIVE:   Past Medical History:   Past Medical History:   Diagnosis Date    Asthma     outgrew this per pt    Autoimmune disease ([address]     CLL    Cancer ([facility] [address] [address] 6/20/14    MULTIPLE SCCAs EXCISED, ALSO BCCAs    CLL (chronic lymphocytic leukemia) (HCC)     2 infusions/states he no longer has this    GERD (gastroesophageal reflux disease)     Hypercholesterolemia     Hypertension     Liver disease 6/6/2014    ADMITTED FOR LIVER DYSFUNCTION, NOT DIAGNOSED, NOW RESOLVED    Pulmonary embolism ([address] 9/2015    Thyroid disease     Unspecified adverse effect of anesthesia     \"out of it for 2 weeks\"    Unspecified sleep apnea     had sleep study but no follow up     Past Surgical History:   Procedure Laterality Date    HX ABDOMINAL LAPAROSCOPY  4/2016    HX COLONOSCOPY      X2, MOST RECNT WAS IN 2013    HX HEENT      tonsillectomy    HX HERNIA REPAIR  4/13/16    paraesophageal hernia repair Dr Ozzie Walton HX ORTHOPAEDIC Left     Frozen shoulder manipulation    HX OTHER SURGICAL      moh's procedures - face    REVISION OF LOWER EYELID       Current Dietary Status:  Regular diet/ thin liquids   Radiologist: (Dr. Natividad Gamez)  Film Views: Lateral;Fluoro  Patient Position: (up in hausted chair)    Trial 1: Trial 2:   Consistency Presented: Thin liquid Consistency Presented: Nectar thick liquid;Honey thick liquid   How Presented: Self-fed/presented;Cup/sip     Consistency Amount: (4oz thin barium) Consistency Amount: (1.5 oz nectar, 15cc honey thick )   Bolus Acceptance: No impairment Bolus Acceptance: No impairment   Bolus Formation/Control: No impairment:   Bolus Formation/Control: No impairment:     Propulsion: No impairment     Oral Residue: None     Initiation of Swallow: Triggered at vallecula     Timing: No impairment Timing: No impairment   Penetration: During swallow; To laryngeal vestibule; To cords Penetration: During swallow; To laryngeal vestibule   Aspiration/Timing: During;From initial swallow(delayed cough- not effective in clearing) Aspiration/Timing: No evidence of aspiration   Pharyngeal Clearance: Vallecular residue;Pyriform residue ;10-50% Pharyngeal Clearance: Vallecular residue;Pyriform residue ;10-50%   Attempted Modifications: Chin tuck     Effective Modifications: Chin tuck(slightly effective only in reducing amount of penetration)     Cues for Modifications: Moderate             Trial 3:   Consistency Presented: Puree; Solid   How Presented: Self-fed/presented;Spoon   Consistency Amount: (2 tsp applesauce, 1/2 cracker)   Bolus Acceptance: No impairment   Bolus Formation/Control: No impairment, issues, or problems :     Propulsion: No impairment   Oral Residue: None   Initiation of Swallow: Triggered at valleculae   Timing: No impairment   Penetration: None   Aspiration/Timing: No evidence of aspiration   Pharyngeal Clearance: Vallecular residue;Pyriform residue ;Greater than 50%   Attempted Modifications: Alternate liquids/solids   Effective Modifications: Alternate liquids/solids   Cues for Modifications: Moderate         Decreased Tongue Base Retraction?: Yes  Laryngeal Elevation: Inadequate epiglottic inversion; Incomplete laryngeal closure; Reduced excursion with laryngeal vestibule gap  Aspiration/Penetration Score: 7 (Aspiration-Contrast passes below the cords/, but is not ejected despite attempt)  Pharyngeal Symmetry: Not assessed  Pharyngeal-Esophageal Segment: No impairment  Pharyngeal Dysfunction: Decreased tongue base retraction;Decreased elevation/closure    Oral Phase Severity: No impairment  Pharyngeal Phase Severity: Moderately severe    ASSESSMENT :  Based on the objective data described above, the patient presents with moderate pharyngeal dysphagia. Dysphagia characterized by mildly delayed swallow initiation occurring at the vallecula, mildly reduced tongue base retraction, incomplete epiglottic inversion, decreased laryngeal elevation and anterior hyoid movement resulting in incomplete laryngeal closure during the swallow. On the first several single sips of thin, patient with trace laryngeal penetration only; however, as trials continued, patient with ebenezer aspiration (with delayed but ineffective cough) during the swallow due to incomplete laryngeal closure (inconsistently). Chin tuck was attempted and resulted in slightly reduced degree of penetration and no aspiration at least that was observed during study. Nectar and honey thick liquids resulted in continued penetration during the swallow. Patient with mild pharyngeal residue with thins and moderate degree with thicker liquids. Despite no aspiration observed with thicker viscosities on study, risk is high given continued penetration and degree of pharyngeal residue. Thin liquid wash helped to reduce but not clear pharyngeal residue. Lengthy conversation with patient and wife after study.  Given continued penetration and increased residue even with thicker viscosities, they will not be of benefit. Do not recommend especially given history of refusal and concern for dehydration per wife. Patient is ambulatory with no recent PNA. Suspect he has been tolerating small amounts of aspiration for quite some time. PLAN/RECOMMENDATIONS :  -- cautiously continue regular diet/ thin liquids. Small, single sips with use of chin tuck  -- continue swallow exercises for maintenance (they have the exercises from 2017) but may be beneficial for short OP ST for structured tx  -- patient will continue to be at high risk of aspiration. Discussed risks. COMMUNICATION/EDUCATION:   The above findings and recommendations were discussed with: patient and his wife who verbalized understanding.     Thank you for this referral.  Syed Landeros M.S. CCC-SLP  Time Calculation: 30 mins

## 2019-12-26 DIAGNOSIS — I10 ESSENTIAL HYPERTENSION WITH GOAL BLOOD PRESSURE LESS THAN 140/90: ICD-10-CM

## 2019-12-26 RX ORDER — AMLODIPINE BESYLATE 10 MG/1
TABLET ORAL
Qty: 90 TAB | Refills: 0 | Status: SHIPPED | OUTPATIENT
Start: 2019-12-26 | End: 2020-05-17

## 2020-01-01 ENCOUNTER — VIRTUAL VISIT (OUTPATIENT)
Dept: INTERNAL MEDICINE CLINIC | Age: 85
End: 2020-01-01
Payer: MEDICARE

## 2020-01-01 ENCOUNTER — HOME CARE VISIT (OUTPATIENT)
Dept: SCHEDULING | Facility: HOME HEALTH | Age: 85
End: 2020-01-01
Payer: MEDICARE

## 2020-01-01 ENCOUNTER — TELEPHONE (OUTPATIENT)
Dept: INTERNAL MEDICINE CLINIC | Age: 85
End: 2020-01-01

## 2020-01-01 ENCOUNTER — HOSPITAL ENCOUNTER (INPATIENT)
Age: 85
LOS: 3 days | Discharge: HOME HEALTH | DRG: 871 | End: 2020-08-12
Attending: EMERGENCY MEDICINE | Admitting: INTERNAL MEDICINE
Payer: MEDICARE

## 2020-01-01 ENCOUNTER — APPOINTMENT (OUTPATIENT)
Dept: VASCULAR SURGERY | Age: 85
DRG: 871 | End: 2020-01-01
Attending: NURSE PRACTITIONER
Payer: MEDICARE

## 2020-01-01 ENCOUNTER — APPOINTMENT (OUTPATIENT)
Dept: NON INVASIVE DIAGNOSTICS | Age: 85
DRG: 871 | End: 2020-01-01
Attending: FAMILY MEDICINE
Payer: MEDICARE

## 2020-01-01 ENCOUNTER — TELEPHONE (OUTPATIENT)
Dept: CARDIOLOGY | Age: 85
End: 2020-01-01

## 2020-01-01 ENCOUNTER — HOME HEALTH ADMISSION (OUTPATIENT)
Dept: HOME HEALTH SERVICES | Facility: HOME HEALTH | Age: 85
End: 2020-01-01
Payer: MEDICARE

## 2020-01-01 ENCOUNTER — HOME CARE VISIT (OUTPATIENT)
Dept: HOME HEALTH SERVICES | Facility: HOME HEALTH | Age: 85
End: 2020-01-01
Payer: MEDICARE

## 2020-01-01 ENCOUNTER — APPOINTMENT (OUTPATIENT)
Dept: MRI IMAGING | Age: 85
DRG: 871 | End: 2020-01-01
Attending: INTERNAL MEDICINE
Payer: MEDICARE

## 2020-01-01 ENCOUNTER — PATIENT OUTREACH (OUTPATIENT)
Dept: CASE MANAGEMENT | Age: 85
End: 2020-01-01

## 2020-01-01 ENCOUNTER — APPOINTMENT (OUTPATIENT)
Dept: GENERAL RADIOLOGY | Age: 85
DRG: 871 | End: 2020-01-01
Attending: EMERGENCY MEDICINE
Payer: MEDICARE

## 2020-01-01 ENCOUNTER — PATIENT MESSAGE (OUTPATIENT)
Dept: INTERNAL MEDICINE CLINIC | Age: 85
End: 2020-01-01

## 2020-01-01 ENCOUNTER — TELEPHONE (OUTPATIENT)
Dept: CARDIOLOGY CLINIC | Age: 85
End: 2020-01-01

## 2020-01-01 ENCOUNTER — HOSPITAL ENCOUNTER (EMERGENCY)
Dept: CT IMAGING | Age: 85
Discharge: HOME | DRG: 871 | End: 2020-08-09
Attending: EMERGENCY MEDICINE
Payer: MEDICARE

## 2020-01-01 ENCOUNTER — OFFICE VISIT (OUTPATIENT)
Dept: CARDIOLOGY CLINIC | Age: 85
End: 2020-01-01
Payer: MEDICARE

## 2020-01-01 ENCOUNTER — APPOINTMENT (OUTPATIENT)
Dept: CT IMAGING | Age: 85
DRG: 871 | End: 2020-01-01
Attending: INTERNAL MEDICINE
Payer: MEDICARE

## 2020-01-01 ENCOUNTER — PATIENT MESSAGE (OUTPATIENT)
Dept: CARDIOLOGY CLINIC | Age: 85
End: 2020-01-01

## 2020-01-01 VITALS
TEMPERATURE: 97.9 F | OXYGEN SATURATION: 98 % | SYSTOLIC BLOOD PRESSURE: 126 MMHG | DIASTOLIC BLOOD PRESSURE: 60 MMHG | RESPIRATION RATE: 18 BRPM | HEART RATE: 72 BPM

## 2020-01-01 VITALS
OXYGEN SATURATION: 97 % | RESPIRATION RATE: 16 BRPM | HEART RATE: 76 BPM | SYSTOLIC BLOOD PRESSURE: 132 MMHG | TEMPERATURE: 98.3 F | DIASTOLIC BLOOD PRESSURE: 80 MMHG | HEIGHT: 70 IN | WEIGHT: 159 LBS | BODY MASS INDEX: 22.76 KG/M2

## 2020-01-01 VITALS
HEART RATE: 68 BPM | RESPIRATION RATE: 18 BRPM | TEMPERATURE: 97.9 F | SYSTOLIC BLOOD PRESSURE: 115 MMHG | OXYGEN SATURATION: 97 % | DIASTOLIC BLOOD PRESSURE: 70 MMHG

## 2020-01-01 VITALS
HEART RATE: 95 BPM | OXYGEN SATURATION: 97 % | SYSTOLIC BLOOD PRESSURE: 115 MMHG | DIASTOLIC BLOOD PRESSURE: 58 MMHG | TEMPERATURE: 98.1 F

## 2020-01-01 VITALS
TEMPERATURE: 97.9 F | RESPIRATION RATE: 16 BRPM | OXYGEN SATURATION: 98 % | WEIGHT: 155.4 LBS | BODY MASS INDEX: 22.25 KG/M2 | HEIGHT: 70 IN | DIASTOLIC BLOOD PRESSURE: 81 MMHG | SYSTOLIC BLOOD PRESSURE: 143 MMHG | HEART RATE: 54 BPM

## 2020-01-01 VITALS
HEART RATE: 55 BPM | OXYGEN SATURATION: 97 % | RESPIRATION RATE: 18 BRPM | DIASTOLIC BLOOD PRESSURE: 72 MMHG | TEMPERATURE: 97.7 F | SYSTOLIC BLOOD PRESSURE: 125 MMHG

## 2020-01-01 VITALS
SYSTOLIC BLOOD PRESSURE: 120 MMHG | OXYGEN SATURATION: 99 % | HEART RATE: 62 BPM | TEMPERATURE: 98.7 F | RESPIRATION RATE: 18 BRPM | DIASTOLIC BLOOD PRESSURE: 70 MMHG

## 2020-01-01 VITALS
HEART RATE: 86 BPM | RESPIRATION RATE: 18 BRPM | DIASTOLIC BLOOD PRESSURE: 70 MMHG | OXYGEN SATURATION: 98 % | TEMPERATURE: 98.1 F | SYSTOLIC BLOOD PRESSURE: 120 MMHG

## 2020-01-01 VITALS
RESPIRATION RATE: 18 BRPM | DIASTOLIC BLOOD PRESSURE: 68 MMHG | SYSTOLIC BLOOD PRESSURE: 120 MMHG | OXYGEN SATURATION: 97 % | TEMPERATURE: 98.6 F | HEART RATE: 76 BPM

## 2020-01-01 VITALS
RESPIRATION RATE: 18 BRPM | SYSTOLIC BLOOD PRESSURE: 120 MMHG | HEART RATE: 62 BPM | TEMPERATURE: 97.9 F | DIASTOLIC BLOOD PRESSURE: 65 MMHG | OXYGEN SATURATION: 98 %

## 2020-01-01 VITALS
HEIGHT: 70 IN | WEIGHT: 147 LBS | RESPIRATION RATE: 16 BRPM | HEART RATE: 70 BPM | BODY MASS INDEX: 21.05 KG/M2 | SYSTOLIC BLOOD PRESSURE: 124 MMHG | DIASTOLIC BLOOD PRESSURE: 78 MMHG

## 2020-01-01 DIAGNOSIS — I21.4 NON-ST ELEVATION MYOCARDIAL INFARCTION (NSTEMI) (HCC): ICD-10-CM

## 2020-01-01 DIAGNOSIS — T17.908D ASPIRATION INTO AIRWAY, SUBSEQUENT ENCOUNTER: ICD-10-CM

## 2020-01-01 DIAGNOSIS — I26.99 OTHER ACUTE PULMONARY EMBOLISM, UNSPECIFIED WHETHER ACUTE COR PULMONALE PRESENT (HCC): ICD-10-CM

## 2020-01-01 DIAGNOSIS — R77.8 ELEVATED TROPONIN: ICD-10-CM

## 2020-01-01 DIAGNOSIS — C91.10 CLL (CHRONIC LYMPHOCYTIC LEUKEMIA) (HCC): ICD-10-CM

## 2020-01-01 DIAGNOSIS — I10 ESSENTIAL HYPERTENSION WITH GOAL BLOOD PRESSURE LESS THAN 140/90: ICD-10-CM

## 2020-01-01 DIAGNOSIS — I10 ESSENTIAL HYPERTENSION: ICD-10-CM

## 2020-01-01 DIAGNOSIS — R41.82 ALTERED MENTAL STATUS, UNSPECIFIED ALTERED MENTAL STATUS TYPE: ICD-10-CM

## 2020-01-01 DIAGNOSIS — I26.99 OTHER PULMONARY EMBOLISM WITHOUT ACUTE COR PULMONALE, UNSPECIFIED CHRONICITY (HCC): ICD-10-CM

## 2020-01-01 DIAGNOSIS — I48.0 PAROXYSMAL ATRIAL FIBRILLATION (HCC): ICD-10-CM

## 2020-01-01 DIAGNOSIS — N39.41 URGE INCONTINENCE: Primary | ICD-10-CM

## 2020-01-01 DIAGNOSIS — E87.1 HYPONATREMIA: Primary | ICD-10-CM

## 2020-01-01 DIAGNOSIS — I26.99 OTHER PULMONARY EMBOLISM WITHOUT ACUTE COR PULMONALE, UNSPECIFIED CHRONICITY (HCC): Primary | ICD-10-CM

## 2020-01-01 DIAGNOSIS — E78.00 HYPERCHOLESTEROLEMIA: ICD-10-CM

## 2020-01-01 DIAGNOSIS — Z95.828 S/P IVC FILTER: ICD-10-CM

## 2020-01-01 DIAGNOSIS — R41.0 DELIRIUM: ICD-10-CM

## 2020-01-01 LAB
ALBUMIN SERPL-MCNC: 2.5 G/DL (ref 3.5–5)
ALBUMIN SERPL-MCNC: 3.5 G/DL (ref 3.5–5)
ALBUMIN/GLOB SERPL: 0.9 {RATIO} (ref 1.1–2.2)
ALBUMIN/GLOB SERPL: 1.1 {RATIO} (ref 1.1–2.2)
ALP SERPL-CCNC: 40 U/L (ref 45–117)
ALP SERPL-CCNC: 52 U/L (ref 45–117)
ALT SERPL-CCNC: 16 U/L (ref 12–78)
ALT SERPL-CCNC: 18 U/L (ref 12–78)
ANION GAP SERPL CALC-SCNC: 7 MMOL/L (ref 5–15)
ANION GAP SERPL CALC-SCNC: 7 MMOL/L (ref 5–15)
ANION GAP SERPL CALC-SCNC: 8 MMOL/L (ref 5–15)
ANION GAP SERPL CALC-SCNC: 8 MMOL/L (ref 5–15)
APPEARANCE UR: CLEAR
APTT PPP: 26.3 SEC (ref 22.1–32)
APTT PPP: 31.9 SEC (ref 22.1–32)
APTT PPP: 55 SEC (ref 22.1–32)
APTT PPP: 59.8 SEC (ref 22.1–32)
APTT PPP: 64.1 SEC (ref 22.1–32)
APTT PPP: 86.2 SEC (ref 22.1–32)
AST SERPL-CCNC: 16 U/L (ref 15–37)
AST SERPL-CCNC: 18 U/L (ref 15–37)
ATRIAL RATE: 91 BPM
BACTERIA SPEC CULT: NORMAL
BASOPHILS # BLD: 0 K/UL (ref 0–0.1)
BASOPHILS NFR BLD: 0 % (ref 0–1)
BILIRUB SERPL-MCNC: 0.4 MG/DL (ref 0.2–1)
BILIRUB SERPL-MCNC: 0.8 MG/DL (ref 0.2–1)
BILIRUB UR QL: NEGATIVE
BNP SERPL-MCNC: 4000 PG/ML
BUN SERPL-MCNC: 10 MG/DL (ref 6–20)
BUN SERPL-MCNC: 15 MG/DL (ref 6–20)
BUN SERPL-MCNC: 16 MG/DL (ref 6–20)
BUN SERPL-MCNC: 19 MG/DL (ref 6–20)
BUN/CREAT SERPL: 13 (ref 12–20)
BUN/CREAT SERPL: 17 (ref 12–20)
BUN/CREAT SERPL: 17 (ref 12–20)
BUN/CREAT SERPL: 19 (ref 12–20)
CALCIUM SERPL-MCNC: 8.1 MG/DL (ref 8.5–10.1)
CALCIUM SERPL-MCNC: 8.5 MG/DL (ref 8.5–10.1)
CALCIUM SERPL-MCNC: 8.5 MG/DL (ref 8.5–10.1)
CALCIUM SERPL-MCNC: 9.4 MG/DL (ref 8.5–10.1)
CALCULATED R AXIS, ECG10: -55 DEGREES
CALCULATED T AXIS, ECG11: 60 DEGREES
CHLORIDE SERPL-SCNC: 102 MMOL/L (ref 97–108)
CHLORIDE SERPL-SCNC: 103 MMOL/L (ref 97–108)
CHLORIDE SERPL-SCNC: 103 MMOL/L (ref 97–108)
CHLORIDE SERPL-SCNC: 106 MMOL/L (ref 97–108)
CO2 SERPL-SCNC: 24 MMOL/L (ref 21–32)
CO2 SERPL-SCNC: 25 MMOL/L (ref 21–32)
CO2 SERPL-SCNC: 26 MMOL/L (ref 21–32)
CO2 SERPL-SCNC: 27 MMOL/L (ref 21–32)
COLOR UR: NORMAL
COMMENT, HOLDF: NORMAL
CREAT SERPL-MCNC: 0.8 MG/DL (ref 0.7–1.3)
CREAT SERPL-MCNC: 0.88 MG/DL (ref 0.7–1.3)
CREAT SERPL-MCNC: 0.92 MG/DL (ref 0.7–1.3)
CREAT SERPL-MCNC: 0.99 MG/DL (ref 0.7–1.3)
D DIMER PPP FEU-MCNC: 0.74 MG/L FEU (ref 0–0.65)
DIAGNOSIS, 93000: NORMAL
DIFFERENTIAL METHOD BLD: ABNORMAL
ECHO AO ROOT DIAM: 3.52 CM
ECHO AR MAX VEL PISA: 415.74 CM/S
ECHO AV AREA PEAK VELOCITY: 3.28 CM2
ECHO AV AREA/BSA PEAK VELOCITY: 1.8 CM2/M2
ECHO AV PEAK GRADIENT: 5.83 MMHG
ECHO AV PEAK VELOCITY: 120.69 CM/S
ECHO AV REGURGITANT PHT: 0.65 S
ECHO EST RA PRESSURE: 3 MMHG
ECHO LA AREA 4C: 19.61 CM2
ECHO LA MAJOR AXIS: 3.3 CM
ECHO LA MINOR AXIS: 1.78 CM
ECHO LA VOL 2C: 39.69 ML (ref 18–58)
ECHO LA VOL 4C: 53.02 ML (ref 18–58)
ECHO LA VOL BP: 51.15 ML (ref 18–58)
ECHO LA VOL/BSA BIPLANE: 27.61 ML/M2 (ref 16–28)
ECHO LA VOLUME INDEX A2C: 21.43 ML/M2 (ref 16–28)
ECHO LA VOLUME INDEX A4C: 28.62 ML/M2 (ref 16–28)
ECHO LV INTERNAL DIMENSION DIASTOLIC: 4.09 CM (ref 4.2–5.9)
ECHO LV INTERNAL DIMENSION SYSTOLIC: 2.84 CM
ECHO LV IVSD: 1.16 CM (ref 0.6–1)
ECHO LV MASS 2D: 170.1 G (ref 88–224)
ECHO LV MASS INDEX 2D: 91.8 G/M2 (ref 49–115)
ECHO LV POSTERIOR WALL DIASTOLIC: 1.23 CM (ref 0.6–1)
ECHO LVOT DIAM: 2.28 CM
ECHO LVOT PEAK GRADIENT: 3.79 MMHG
ECHO LVOT PEAK VELOCITY: 97.32 CM/S
ECHO MV A VELOCITY: 82.25 CM/S
ECHO MV AREA PHT: 2.79 CM2
ECHO MV E DECELERATION TIME (DT): 0.27 S
ECHO MV E VELOCITY: 85.47 CM/S
ECHO MV E/A RATIO: 1.04
ECHO MV PRESSURE HALF TIME (PHT): 0.08 S
ECHO PV PEAK INSTANTANEOUS GRADIENT SYSTOLIC: 2.76 MMHG
ECHO RIGHT VENTRICULAR SYSTOLIC PRESSURE (RVSP): 30.08 MMHG
ECHO RV INTERNAL DIMENSION: 4.52 CM
ECHO RV TAPSE: 2.19 CM (ref 1.5–2)
ECHO TV REGURGITANT MAX VELOCITY: 260.19 CM/S
ECHO TV REGURGITANT PEAK GRADIENT: 27.08 MMHG
EOSINOPHIL # BLD: 0 K/UL (ref 0–0.4)
EOSINOPHIL # BLD: 0 K/UL (ref 0–0.4)
EOSINOPHIL # BLD: 0.1 K/UL (ref 0–0.4)
EOSINOPHIL NFR BLD: 0 % (ref 0–7)
EOSINOPHIL NFR BLD: 0 % (ref 0–7)
EOSINOPHIL NFR BLD: 2 % (ref 0–7)
ERYTHROCYTE [DISTWIDTH] IN BLOOD BY AUTOMATED COUNT: 12.6 % (ref 11.5–14.5)
ERYTHROCYTE [DISTWIDTH] IN BLOOD BY AUTOMATED COUNT: 12.9 % (ref 11.5–14.5)
ERYTHROCYTE [DISTWIDTH] IN BLOOD BY AUTOMATED COUNT: 12.9 % (ref 11.5–14.5)
EST. AVERAGE GLUCOSE BLD GHB EST-MCNC: 105 MG/DL
FERRITIN SERPL-MCNC: 91 NG/ML (ref 26–388)
FIBRINOGEN PPP-MCNC: 487 MG/DL (ref 200–475)
FOLATE BLD-MCNC: >620 NG/ML
FOLATE RBC-MCNC: >1512 NG/ML
GLOBULIN SER CALC-MCNC: 2.7 G/DL (ref 2–4)
GLOBULIN SER CALC-MCNC: 3.2 G/DL (ref 2–4)
GLUCOSE SERPL-MCNC: 138 MG/DL (ref 65–100)
GLUCOSE SERPL-MCNC: 95 MG/DL (ref 65–100)
GLUCOSE SERPL-MCNC: 96 MG/DL (ref 65–100)
GLUCOSE SERPL-MCNC: 98 MG/DL (ref 65–100)
GLUCOSE UR STRIP.AUTO-MCNC: NEGATIVE MG/DL
HBA1C MFR BLD: 5.3 % (ref 4–5.6)
HCT VFR BLD AUTO: 33.2 % (ref 36.6–50.3)
HCT VFR BLD AUTO: 35 % (ref 36.6–50.3)
HCT VFR BLD AUTO: 39.6 % (ref 36.6–50.3)
HCT VFR BLD AUTO: 41 % (ref 37.5–51)
HEALTH STATUS, XMCV2T: NORMAL
HGB BLD-MCNC: 11 G/DL (ref 12.1–17)
HGB BLD-MCNC: 11.8 G/DL (ref 12.1–17)
HGB BLD-MCNC: 13.4 G/DL (ref 12.1–17)
HGB UR QL STRIP: NEGATIVE
IMM GRANULOCYTES # BLD AUTO: 0 K/UL (ref 0–0.04)
IMM GRANULOCYTES # BLD AUTO: 0.1 K/UL (ref 0–0.04)
IMM GRANULOCYTES # BLD AUTO: 0.2 K/UL (ref 0–0.04)
IMM GRANULOCYTES NFR BLD AUTO: 0 % (ref 0–0.5)
IMM GRANULOCYTES NFR BLD AUTO: 1 % (ref 0–0.5)
IMM GRANULOCYTES NFR BLD AUTO: 1 % (ref 0–0.5)
INR PPP: 1 (ref 0.9–1.1)
KETONES UR QL STRIP.AUTO: NEGATIVE MG/DL
LACTATE SERPL-SCNC: 0.9 MMOL/L (ref 0.4–2)
LACTATE SERPL-SCNC: 1.1 MMOL/L (ref 0.4–2)
LACTATE SERPL-SCNC: 2.8 MMOL/L (ref 0.4–2)
LDH SERPL L TO P-CCNC: 180 U/L (ref 85–241)
LEUKOCYTE ESTERASE UR QL STRIP.AUTO: NEGATIVE
LIPASE SERPL-CCNC: 47 U/L (ref 73–393)
LYMPHOCYTES # BLD: 1.2 K/UL (ref 0.8–3.5)
LYMPHOCYTES # BLD: 1.4 K/UL (ref 0.8–3.5)
LYMPHOCYTES # BLD: 1.8 K/UL (ref 0.8–3.5)
LYMPHOCYTES NFR BLD: 11 % (ref 12–49)
LYMPHOCYTES NFR BLD: 13 % (ref 12–49)
LYMPHOCYTES NFR BLD: 21 % (ref 12–49)
MAGNESIUM SERPL-MCNC: 1.8 MG/DL (ref 1.6–2.4)
MCH RBC QN AUTO: 31 PG (ref 26–34)
MCH RBC QN AUTO: 31.1 PG (ref 26–34)
MCH RBC QN AUTO: 31.3 PG (ref 26–34)
MCHC RBC AUTO-ENTMCNC: 33.1 G/DL (ref 30–36.5)
MCHC RBC AUTO-ENTMCNC: 33.7 G/DL (ref 30–36.5)
MCHC RBC AUTO-ENTMCNC: 33.8 G/DL (ref 30–36.5)
MCV RBC AUTO: 91.7 FL (ref 80–99)
MCV RBC AUTO: 92.3 FL (ref 80–99)
MCV RBC AUTO: 94.3 FL (ref 80–99)
MONOCYTES # BLD: 0.4 K/UL (ref 0–1)
MONOCYTES # BLD: 0.4 K/UL (ref 0–1)
MONOCYTES # BLD: 0.6 K/UL (ref 0–1)
MONOCYTES NFR BLD: 4 % (ref 5–13)
MONOCYTES NFR BLD: 5 % (ref 5–13)
MONOCYTES NFR BLD: 6 % (ref 5–13)
NEUTS SEG # BLD: 13.8 K/UL (ref 1.8–8)
NEUTS SEG # BLD: 5 K/UL (ref 1.8–8)
NEUTS SEG # BLD: 7.2 K/UL (ref 1.8–8)
NEUTS SEG NFR BLD: 71 % (ref 32–75)
NEUTS SEG NFR BLD: 81 % (ref 32–75)
NEUTS SEG NFR BLD: 84 % (ref 32–75)
NITRITE UR QL STRIP.AUTO: NEGATIVE
NRBC # BLD: 0 K/UL (ref 0–0.01)
NRBC BLD-RTO: 0 PER 100 WBC
P-R INTERVAL, ECG05: 158 MS
PH UR STRIP: 5 [PH] (ref 5–8)
PHOSPHATE SERPL-MCNC: 2 MG/DL (ref 2.6–4.7)
PLATELET # BLD AUTO: 101 K/UL (ref 150–400)
PLATELET # BLD AUTO: 119 K/UL (ref 150–400)
PLATELET # BLD AUTO: 95 K/UL (ref 150–400)
PMV BLD AUTO: 9.5 FL (ref 8.9–12.9)
PMV BLD AUTO: 9.7 FL (ref 8.9–12.9)
PMV BLD AUTO: 9.8 FL (ref 8.9–12.9)
POTASSIUM SERPL-SCNC: 3.4 MMOL/L (ref 3.5–5.1)
POTASSIUM SERPL-SCNC: 3.4 MMOL/L (ref 3.5–5.1)
POTASSIUM SERPL-SCNC: 3.5 MMOL/L (ref 3.5–5.1)
POTASSIUM SERPL-SCNC: 3.6 MMOL/L (ref 3.5–5.1)
PROT SERPL-MCNC: 5.2 G/DL (ref 6.4–8.2)
PROT SERPL-MCNC: 6.7 G/DL (ref 6.4–8.2)
PROT UR STRIP-MCNC: NEGATIVE MG/DL
PROTHROMBIN TIME: 10.8 SEC (ref 9–11.1)
Q-T INTERVAL, ECG07: 380 MS
QRS DURATION, ECG06: 78 MS
QTC CALCULATION (BEZET), ECG08: 467 MS
RBC # BLD AUTO: 3.52 M/UL (ref 4.1–5.7)
RBC # BLD AUTO: 3.79 M/UL (ref 4.1–5.7)
RBC # BLD AUTO: 4.32 M/UL (ref 4.1–5.7)
RBC MORPH BLD: ABNORMAL
RBC MORPH BLD: ABNORMAL
SAMPLES BEING HELD,HOLD: NORMAL
SARS-COV-2, COV2: NOT DETECTED
SERVICE CMNT-IMP: NORMAL
SODIUM SERPL-SCNC: 136 MMOL/L (ref 136–145)
SODIUM SERPL-SCNC: 136 MMOL/L (ref 136–145)
SODIUM SERPL-SCNC: 137 MMOL/L (ref 136–145)
SODIUM SERPL-SCNC: 137 MMOL/L (ref 136–145)
SOURCE, COVRS: NORMAL
SP GR UR REFRACTOMETRY: 1.02
SPECIMEN SOURCE, FCOV2M: NORMAL
SPECIMEN TYPE, XMCV1T: NORMAL
THERAPEUTIC RANGE,PTTT: ABNORMAL SECS (ref 58–77)
THERAPEUTIC RANGE,PTTT: NORMAL SECS (ref 58–77)
THERAPEUTIC RANGE,PTTT: NORMAL SECS (ref 58–77)
TROPONIN I SERPL-MCNC: 0.12 NG/ML
TROPONIN I SERPL-MCNC: 0.13 NG/ML
TROPONIN I SERPL-MCNC: 0.13 NG/ML
TROPONIN I SERPL-MCNC: 0.16 NG/ML
TSH SERPL DL<=0.05 MIU/L-ACNC: 0.3 UIU/ML (ref 0.36–3.74)
UROBILINOGEN UR QL STRIP.AUTO: 0.2 EU/DL (ref 0.2–1)
VENTRICULAR RATE, ECG03: 91 BPM
VIT B12 SERPL-MCNC: >2000 PG/ML (ref 193–986)
WBC # BLD AUTO: 16.5 K/UL (ref 4.1–11.1)
WBC # BLD AUTO: 6.9 K/UL (ref 4.1–11.1)
WBC # BLD AUTO: 8.9 K/UL (ref 4.1–11.1)

## 2020-01-01 PROCEDURE — 94760 N-INVAS EAR/PLS OXIMETRY 1: CPT

## 2020-01-01 PROCEDURE — 3331090001 HH PPS REVENUE CREDIT

## 2020-01-01 PROCEDURE — 97161 PT EVAL LOW COMPLEX 20 MIN: CPT

## 2020-01-01 PROCEDURE — 3331090002 HH PPS REVENUE DEBIT

## 2020-01-01 PROCEDURE — 74177 CT ABD & PELVIS W/CONTRAST: CPT

## 2020-01-01 PROCEDURE — G0151 HHCP-SERV OF PT,EA 15 MIN: HCPCS

## 2020-01-01 PROCEDURE — 84100 ASSAY OF PHOSPHORUS: CPT

## 2020-01-01 PROCEDURE — 83605 ASSAY OF LACTIC ACID: CPT

## 2020-01-01 PROCEDURE — 83690 ASSAY OF LIPASE: CPT

## 2020-01-01 PROCEDURE — 80053 COMPREHEN METABOLIC PANEL: CPT

## 2020-01-01 PROCEDURE — 93306 TTE W/DOPPLER COMPLETE: CPT

## 2020-01-01 PROCEDURE — 97530 THERAPEUTIC ACTIVITIES: CPT

## 2020-01-01 PROCEDURE — 93970 EXTREMITY STUDY: CPT

## 2020-01-01 PROCEDURE — 85025 COMPLETE CBC W/AUTO DIFF WBC: CPT

## 2020-01-01 PROCEDURE — 74011000258 HC RX REV CODE- 258: Performed by: INTERNAL MEDICINE

## 2020-01-01 PROCEDURE — 99442 PR PHYS/QHP TELEPHONE EVALUATION 11-20 MIN: CPT | Performed by: INTERNAL MEDICINE

## 2020-01-01 PROCEDURE — G0153 HHCP-SVS OF S/L PATH,EA 15MN: HCPCS

## 2020-01-01 PROCEDURE — 74011250636 HC RX REV CODE- 250/636: Performed by: INTERNAL MEDICINE

## 2020-01-01 PROCEDURE — 83615 LACTATE (LD) (LDH) ENZYME: CPT

## 2020-01-01 PROCEDURE — 400013 HH SOC

## 2020-01-01 PROCEDURE — 82607 VITAMIN B-12: CPT

## 2020-01-01 PROCEDURE — 74011250636 HC RX REV CODE- 250/636: Performed by: NURSE PRACTITIONER

## 2020-01-01 PROCEDURE — 87635 SARS-COV-2 COVID-19 AMP PRB: CPT

## 2020-01-01 PROCEDURE — 85730 THROMBOPLASTIN TIME PARTIAL: CPT

## 2020-01-01 PROCEDURE — 74011250637 HC RX REV CODE- 250/637: Performed by: INTERNAL MEDICINE

## 2020-01-01 PROCEDURE — 99222 1ST HOSP IP/OBS MODERATE 55: CPT | Performed by: INTERNAL MEDICINE

## 2020-01-01 PROCEDURE — 77030040831 HC BAG URINE DRNG MDII -A

## 2020-01-01 PROCEDURE — G0463 HOSPITAL OUTPT CLINIC VISIT: HCPCS | Performed by: INTERNAL MEDICINE

## 2020-01-01 PROCEDURE — 80048 BASIC METABOLIC PNL TOTAL CA: CPT

## 2020-01-01 PROCEDURE — 97165 OT EVAL LOW COMPLEX 30 MIN: CPT

## 2020-01-01 PROCEDURE — C1758 CATHETER, URETERAL: HCPCS

## 2020-01-01 PROCEDURE — 97535 SELF CARE MNGMENT TRAINING: CPT

## 2020-01-01 PROCEDURE — 96365 THER/PROPH/DIAG IV INF INIT: CPT

## 2020-01-01 PROCEDURE — 93005 ELECTROCARDIOGRAM TRACING: CPT

## 2020-01-01 PROCEDURE — 74011000258 HC RX REV CODE- 258: Performed by: RADIOLOGY

## 2020-01-01 PROCEDURE — G0157 HHC PT ASSISTANT EA 15: HCPCS

## 2020-01-01 PROCEDURE — 36415 COLL VENOUS BLD VENIPUNCTURE: CPT

## 2020-01-01 PROCEDURE — 71275 CT ANGIOGRAPHY CHEST: CPT

## 2020-01-01 PROCEDURE — 93306 TTE W/DOPPLER COMPLETE: CPT | Performed by: SPECIALIST

## 2020-01-01 PROCEDURE — 84484 ASSAY OF TROPONIN QUANT: CPT

## 2020-01-01 PROCEDURE — 83735 ASSAY OF MAGNESIUM: CPT

## 2020-01-01 PROCEDURE — 82747 ASSAY OF FOLIC ACID RBC: CPT

## 2020-01-01 PROCEDURE — G0152 HHCP-SERV OF OT,EA 15 MIN: HCPCS

## 2020-01-01 PROCEDURE — 81003 URINALYSIS AUTO W/O SCOPE: CPT

## 2020-01-01 PROCEDURE — 83036 HEMOGLOBIN GLYCOSYLATED A1C: CPT

## 2020-01-01 PROCEDURE — 85384 FIBRINOGEN ACTIVITY: CPT

## 2020-01-01 PROCEDURE — 85610 PROTHROMBIN TIME: CPT

## 2020-01-01 PROCEDURE — 74011636320 HC RX REV CODE- 636/320: Performed by: RADIOLOGY

## 2020-01-01 PROCEDURE — 65660000000 HC RM CCU STEPDOWN

## 2020-01-01 PROCEDURE — 82728 ASSAY OF FERRITIN: CPT

## 2020-01-01 PROCEDURE — 97116 GAIT TRAINING THERAPY: CPT

## 2020-01-01 PROCEDURE — 70551 MRI BRAIN STEM W/O DYE: CPT

## 2020-01-01 PROCEDURE — 70450 CT HEAD/BRAIN W/O DYE: CPT

## 2020-01-01 PROCEDURE — 71045 X-RAY EXAM CHEST 1 VIEW: CPT

## 2020-01-01 PROCEDURE — 99285 EMERGENCY DEPT VISIT HI MDM: CPT

## 2020-01-01 PROCEDURE — 99233 SBSQ HOSP IP/OBS HIGH 50: CPT | Performed by: INTERNAL MEDICINE

## 2020-01-01 PROCEDURE — 83880 ASSAY OF NATRIURETIC PEPTIDE: CPT

## 2020-01-01 PROCEDURE — 85379 FIBRIN DEGRADATION QUANT: CPT

## 2020-01-01 PROCEDURE — 74011636320 HC RX REV CODE- 636/320: Performed by: FAMILY MEDICINE

## 2020-01-01 PROCEDURE — 87040 BLOOD CULTURE FOR BACTERIA: CPT

## 2020-01-01 PROCEDURE — 92610 EVALUATE SWALLOWING FUNCTION: CPT | Performed by: SPEECH-LANGUAGE PATHOLOGIST

## 2020-01-01 PROCEDURE — 99214 OFFICE O/P EST MOD 30 MIN: CPT | Performed by: INTERNAL MEDICINE

## 2020-01-01 PROCEDURE — 84443 ASSAY THYROID STIM HORMONE: CPT

## 2020-01-01 PROCEDURE — 74011250636 HC RX REV CODE- 250/636: Performed by: EMERGENCY MEDICINE

## 2020-01-01 PROCEDURE — 74011000258 HC RX REV CODE- 258: Performed by: EMERGENCY MEDICINE

## 2020-01-01 PROCEDURE — 74011000258 HC RX REV CODE- 258: Performed by: FAMILY MEDICINE

## 2020-01-01 PROCEDURE — 74011250637 HC RX REV CODE- 250/637: Performed by: HOSPITALIST

## 2020-01-01 RX ORDER — PROMETHAZINE HYDROCHLORIDE 25 MG/1
12.5 TABLET ORAL
Status: DISCONTINUED | OUTPATIENT
Start: 2020-01-01 | End: 2020-01-01 | Stop reason: HOSPADM

## 2020-01-01 RX ORDER — ACETAMINOPHEN 325 MG/1
650 TABLET ORAL
Status: DISCONTINUED | OUTPATIENT
Start: 2020-01-01 | End: 2020-01-01 | Stop reason: HOSPADM

## 2020-01-01 RX ORDER — ACETAMINOPHEN 650 MG/1
650 SUPPOSITORY RECTAL
Status: DISCONTINUED | OUTPATIENT
Start: 2020-01-01 | End: 2020-01-01 | Stop reason: HOSPADM

## 2020-01-01 RX ORDER — SODIUM CHLORIDE 0.9 % (FLUSH) 0.9 %
5-40 SYRINGE (ML) INJECTION AS NEEDED
Status: DISCONTINUED | OUTPATIENT
Start: 2020-01-01 | End: 2020-01-01 | Stop reason: HOSPADM

## 2020-01-01 RX ORDER — CEFUROXIME AXETIL 500 MG/1
500 TABLET ORAL 2 TIMES DAILY
Qty: 10 TAB | Refills: 0 | Status: SHIPPED | OUTPATIENT
Start: 2020-01-01 | End: 2020-01-01 | Stop reason: ALTCHOICE

## 2020-01-01 RX ORDER — SODIUM CHLORIDE 0.9 % (FLUSH) 0.9 %
10 SYRINGE (ML) INJECTION
Status: COMPLETED | OUTPATIENT
Start: 2020-01-01 | End: 2020-01-01

## 2020-01-01 RX ORDER — LEVOTHYROXINE SODIUM 150 UG/1
150 TABLET ORAL
Status: DISCONTINUED | OUTPATIENT
Start: 2020-01-01 | End: 2020-01-01 | Stop reason: HOSPADM

## 2020-01-01 RX ORDER — POLYETHYLENE GLYCOL 3350 17 G/17G
17 POWDER, FOR SOLUTION ORAL DAILY PRN
Status: DISCONTINUED | OUTPATIENT
Start: 2020-01-01 | End: 2020-01-01 | Stop reason: HOSPADM

## 2020-01-01 RX ORDER — ENOXAPARIN SODIUM 100 MG/ML
40 INJECTION SUBCUTANEOUS DAILY
Status: DISCONTINUED | OUTPATIENT
Start: 2020-01-01 | End: 2020-01-01

## 2020-01-01 RX ORDER — HYDROCHLOROTHIAZIDE 25 MG/1
TABLET ORAL
Qty: 90 TAB | Refills: 0 | Status: SHIPPED | OUTPATIENT
Start: 2020-01-01

## 2020-01-01 RX ORDER — HEPARIN SODIUM 10000 [USP'U]/100ML
18-36 INJECTION, SOLUTION INTRAVENOUS
Status: DISCONTINUED | OUTPATIENT
Start: 2020-01-01 | End: 2020-01-01

## 2020-01-01 RX ORDER — LEVOFLOXACIN 5 MG/ML
750 INJECTION, SOLUTION INTRAVENOUS ONCE
Status: COMPLETED | OUTPATIENT
Start: 2020-01-01 | End: 2020-01-01

## 2020-01-01 RX ORDER — LANOLIN ALCOHOL/MO/W.PET/CERES
1000 CREAM (GRAM) TOPICAL DAILY
Status: DISCONTINUED | OUTPATIENT
Start: 2020-01-01 | End: 2020-01-01 | Stop reason: HOSPADM

## 2020-01-01 RX ORDER — HYDROCHLOROTHIAZIDE 25 MG/1
25 TABLET ORAL DAILY
Status: DISCONTINUED | OUTPATIENT
Start: 2020-01-01 | End: 2020-01-01 | Stop reason: HOSPADM

## 2020-01-01 RX ORDER — ONDANSETRON 2 MG/ML
4 INJECTION INTRAMUSCULAR; INTRAVENOUS
Status: DISCONTINUED | OUTPATIENT
Start: 2020-01-01 | End: 2020-01-01 | Stop reason: HOSPADM

## 2020-01-01 RX ORDER — SODIUM CHLORIDE, SODIUM LACTATE, POTASSIUM CHLORIDE, CALCIUM CHLORIDE 600; 310; 30; 20 MG/100ML; MG/100ML; MG/100ML; MG/100ML
75 INJECTION, SOLUTION INTRAVENOUS CONTINUOUS
Status: DISCONTINUED | OUTPATIENT
Start: 2020-01-01 | End: 2020-01-01

## 2020-01-01 RX ORDER — ALBUTEROL SULFATE 90 UG/1
2 AEROSOL, METERED RESPIRATORY (INHALATION)
Qty: 1 INHALER | Refills: 3 | Status: SHIPPED | OUTPATIENT
Start: 2020-01-01

## 2020-01-01 RX ORDER — SODIUM CHLORIDE 0.9 % (FLUSH) 0.9 %
5-40 SYRINGE (ML) INJECTION EVERY 8 HOURS
Status: DISCONTINUED | OUTPATIENT
Start: 2020-01-01 | End: 2020-01-01 | Stop reason: HOSPADM

## 2020-01-01 RX ORDER — GUAIFENESIN 100 MG/5ML
81 LIQUID (ML) ORAL DAILY
Status: DISCONTINUED | OUTPATIENT
Start: 2020-01-01 | End: 2020-01-01 | Stop reason: HOSPADM

## 2020-01-01 RX ORDER — SODIUM CHLORIDE 0.9 % (FLUSH) 0.9 %
5-10 SYRINGE (ML) INJECTION AS NEEDED
Status: DISCONTINUED | OUTPATIENT
Start: 2020-01-01 | End: 2020-01-01 | Stop reason: HOSPADM

## 2020-01-01 RX ORDER — VANCOMYCIN/0.9 % SOD CHLORIDE 1.5G/250ML
1500 PLASTIC BAG, INJECTION (ML) INTRAVENOUS ONCE
Status: COMPLETED | OUTPATIENT
Start: 2020-01-01 | End: 2020-01-01

## 2020-01-01 RX ORDER — AMLODIPINE BESYLATE 5 MG/1
5 TABLET ORAL DAILY
Status: DISCONTINUED | OUTPATIENT
Start: 2020-01-01 | End: 2020-01-01 | Stop reason: HOSPADM

## 2020-01-01 RX ORDER — MELATONIN
2 DAILY
Status: DISCONTINUED | OUTPATIENT
Start: 2020-01-01 | End: 2020-01-01 | Stop reason: HOSPADM

## 2020-01-01 RX ORDER — PANTOPRAZOLE SODIUM 40 MG/1
40 TABLET, DELAYED RELEASE ORAL
Status: DISCONTINUED | OUTPATIENT
Start: 2020-01-01 | End: 2020-01-01 | Stop reason: HOSPADM

## 2020-01-01 RX ORDER — SOLIFENACIN SUCCINATE 5 MG/1
5 TABLET, FILM COATED ORAL DAILY
Qty: 30 TAB | Refills: 1 | Status: SHIPPED | OUTPATIENT
Start: 2020-01-01

## 2020-01-01 RX ADMIN — CEFEPIME HYDROCHLORIDE 2 G: 2 INJECTION, POWDER, FOR SOLUTION INTRAVENOUS at 17:47

## 2020-01-01 RX ADMIN — Medication 10 ML: at 14:00

## 2020-01-01 RX ADMIN — PANTOPRAZOLE SODIUM 40 MG: 40 TABLET, DELAYED RELEASE ORAL at 07:17

## 2020-01-01 RX ADMIN — CEFEPIME HYDROCHLORIDE 2 G: 2 INJECTION, POWDER, FOR SOLUTION INTRAVENOUS at 05:58

## 2020-01-01 RX ADMIN — PANTOPRAZOLE SODIUM 40 MG: 40 TABLET, DELAYED RELEASE ORAL at 09:50

## 2020-01-01 RX ADMIN — Medication 10 ML: at 16:22

## 2020-01-01 RX ADMIN — LEVOTHYROXINE SODIUM 150 MCG: 0.15 TABLET ORAL at 06:31

## 2020-01-01 RX ADMIN — LEVOTHYROXINE SODIUM 150 MCG: 0.15 TABLET ORAL at 07:17

## 2020-01-01 RX ADMIN — Medication 10 ML: at 05:05

## 2020-01-01 RX ADMIN — ASPIRIN 81 MG CHEWABLE TABLET 81 MG: 81 TABLET CHEWABLE at 09:56

## 2020-01-01 RX ADMIN — Medication 10 ML: at 21:51

## 2020-01-01 RX ADMIN — Medication 10 ML: at 06:24

## 2020-01-01 RX ADMIN — CEFEPIME HYDROCHLORIDE 2 G: 2 INJECTION, POWDER, FOR SOLUTION INTRAVENOUS at 18:36

## 2020-01-01 RX ADMIN — Medication 10 ML: at 06:03

## 2020-01-01 RX ADMIN — HEPARIN SODIUM AND DEXTROSE 16 UNITS/KG/HR: 10000; 5 INJECTION INTRAVENOUS at 22:01

## 2020-01-01 RX ADMIN — VANCOMYCIN HYDROCHLORIDE 1000 MG: 1 INJECTION, POWDER, LYOPHILIZED, FOR SOLUTION INTRAVENOUS at 07:16

## 2020-01-01 RX ADMIN — PANTOPRAZOLE SODIUM 40 MG: 40 TABLET, DELAYED RELEASE ORAL at 07:20

## 2020-01-01 RX ADMIN — SODIUM CHLORIDE 50 ML: 900 INJECTION, SOLUTION INTRAVENOUS at 05:04

## 2020-01-01 RX ADMIN — VANCOMYCIN HYDROCHLORIDE 1000 MG: 1 INJECTION, POWDER, LYOPHILIZED, FOR SOLUTION INTRAVENOUS at 23:54

## 2020-01-01 RX ADMIN — CEFEPIME HYDROCHLORIDE 2 G: 2 INJECTION, POWDER, FOR SOLUTION INTRAVENOUS at 06:02

## 2020-01-01 RX ADMIN — LEVOFLOXACIN 750 MG: 5 INJECTION, SOLUTION INTRAVENOUS at 06:53

## 2020-01-01 RX ADMIN — HYDROCHLOROTHIAZIDE 25 MG: 25 TABLET ORAL at 09:56

## 2020-01-01 RX ADMIN — Medication 10 ML: at 21:45

## 2020-01-01 RX ADMIN — Medication 10 ML: at 21:38

## 2020-01-01 RX ADMIN — Medication 10 ML: at 06:00

## 2020-01-01 RX ADMIN — SODIUM CHLORIDE, SODIUM LACTATE, POTASSIUM CHLORIDE, AND CALCIUM CHLORIDE 75 ML/HR: 600; 310; 30; 20 INJECTION, SOLUTION INTRAVENOUS at 08:33

## 2020-01-01 RX ADMIN — CEFEPIME HYDROCHLORIDE 2 G: 2 INJECTION, POWDER, FOR SOLUTION INTRAVENOUS at 06:17

## 2020-01-01 RX ADMIN — ASPIRIN 81 MG CHEWABLE TABLET 81 MG: 81 TABLET CHEWABLE at 08:54

## 2020-01-01 RX ADMIN — CEFEPIME HYDROCHLORIDE 2 G: 2 INJECTION, POWDER, FOR SOLUTION INTRAVENOUS at 17:58

## 2020-01-01 RX ADMIN — APIXABAN 10 MG: 5 TABLET, FILM COATED ORAL at 09:56

## 2020-01-01 RX ADMIN — SODIUM CHLORIDE, SODIUM LACTATE, POTASSIUM CHLORIDE, AND CALCIUM CHLORIDE 75 ML/HR: 600; 310; 30; 20 INJECTION, SOLUTION INTRAVENOUS at 21:49

## 2020-01-01 RX ADMIN — SODIUM CHLORIDE, SODIUM LACTATE, POTASSIUM CHLORIDE, AND CALCIUM CHLORIDE 75 ML/HR: 600; 310; 30; 20 INJECTION, SOLUTION INTRAVENOUS at 14:14

## 2020-01-01 RX ADMIN — IOPAMIDOL 100 ML: 755 INJECTION, SOLUTION INTRAVENOUS at 05:05

## 2020-01-01 RX ADMIN — VANCOMYCIN HYDROCHLORIDE 1000 MG: 1 INJECTION, POWDER, LYOPHILIZED, FOR SOLUTION INTRAVENOUS at 16:21

## 2020-01-01 RX ADMIN — ASPIRIN 81 MG CHEWABLE TABLET 81 MG: 81 TABLET CHEWABLE at 17:58

## 2020-01-01 RX ADMIN — AMLODIPINE BESYLATE 5 MG: 5 TABLET ORAL at 09:56

## 2020-01-01 RX ADMIN — CYANOCOBALAMIN TAB 500 MCG 1000 MCG: 500 TAB at 09:56

## 2020-01-01 RX ADMIN — SODIUM CHLORIDE 1000 ML: 9 INJECTION, SOLUTION INTRAVENOUS at 07:01

## 2020-01-01 RX ADMIN — SODIUM CHLORIDE 100 ML: 900 INJECTION, SOLUTION INTRAVENOUS at 22:05

## 2020-01-01 RX ADMIN — PANTOPRAZOLE SODIUM 40 MG: 40 TABLET, DELAYED RELEASE ORAL at 06:31

## 2020-01-01 RX ADMIN — ASPIRIN 81 MG CHEWABLE TABLET 81 MG: 81 TABLET CHEWABLE at 08:17

## 2020-01-01 RX ADMIN — VANCOMYCIN HYDROCHLORIDE 1500 MG: 10 INJECTION, POWDER, LYOPHILIZED, FOR SOLUTION INTRAVENOUS at 09:37

## 2020-01-01 RX ADMIN — ENOXAPARIN SODIUM 40 MG: 40 INJECTION SUBCUTANEOUS at 09:50

## 2020-01-01 RX ADMIN — Medication 10 ML: at 22:05

## 2020-01-01 RX ADMIN — IOPAMIDOL 80 ML: 755 INJECTION, SOLUTION INTRAVENOUS at 13:00

## 2020-01-01 RX ADMIN — VITAMIN D 2 TABLET: 25 TAB ORAL at 09:56

## 2020-01-01 RX ADMIN — SODIUM CHLORIDE, SODIUM LACTATE, POTASSIUM CHLORIDE, AND CALCIUM CHLORIDE 75 ML/HR: 600; 310; 30; 20 INJECTION, SOLUTION INTRAVENOUS at 09:34

## 2020-01-01 RX ADMIN — SODIUM CHLORIDE 500 ML: 900 INJECTION, SOLUTION INTRAVENOUS at 05:12

## 2020-01-01 RX ADMIN — LEVOTHYROXINE SODIUM 150 MCG: 0.15 TABLET ORAL at 07:19

## 2020-01-01 RX ADMIN — CEFEPIME HYDROCHLORIDE 2 G: 2 INJECTION, POWDER, FOR SOLUTION INTRAVENOUS at 07:17

## 2020-01-01 RX ADMIN — HEPARIN SODIUM AND DEXTROSE 18 UNITS/KG/HR: 10000; 5 INJECTION INTRAVENOUS at 01:17

## 2020-04-09 DIAGNOSIS — I10 ESSENTIAL HYPERTENSION WITH GOAL BLOOD PRESSURE LESS THAN 140/90: ICD-10-CM

## 2020-04-09 RX ORDER — HYDROCHLOROTHIAZIDE 25 MG/1
TABLET ORAL
Qty: 90 TAB | Refills: 0 | Status: SHIPPED | OUTPATIENT
Start: 2020-04-09 | End: 2020-04-12

## 2020-04-11 DIAGNOSIS — I10 ESSENTIAL HYPERTENSION WITH GOAL BLOOD PRESSURE LESS THAN 140/90: ICD-10-CM

## 2020-04-12 RX ORDER — HYDROCHLOROTHIAZIDE 25 MG/1
TABLET ORAL
Qty: 90 TAB | Refills: 0 | Status: SHIPPED | OUTPATIENT
Start: 2020-04-12 | End: 2020-01-01

## 2020-04-27 RX ORDER — LEVOTHYROXINE SODIUM 175 UG/1
175 TABLET ORAL
Qty: 90 TAB | Refills: 0 | Status: SHIPPED | OUTPATIENT
Start: 2020-04-27 | End: 2020-06-22 | Stop reason: DRUGHIGH

## 2020-04-27 RX ORDER — LEVOTHYROXINE SODIUM 150 UG/1
TABLET ORAL
Qty: 85 TAB | Refills: 0 | OUTPATIENT
Start: 2020-04-27

## 2020-05-16 DIAGNOSIS — I10 ESSENTIAL HYPERTENSION WITH GOAL BLOOD PRESSURE LESS THAN 140/90: ICD-10-CM

## 2020-05-17 RX ORDER — AMLODIPINE BESYLATE 10 MG/1
TABLET ORAL
Qty: 90 TAB | Refills: 0 | Status: SHIPPED | OUTPATIENT
Start: 2020-05-17 | End: 2020-06-10

## 2020-06-01 LAB — CREATININE, EXTERNAL: 1.3

## 2020-06-03 ENCOUNTER — TELEPHONE (OUTPATIENT)
Dept: INTERNAL MEDICINE CLINIC | Age: 85
End: 2020-06-03

## 2020-06-04 ENCOUNTER — TELEPHONE (OUTPATIENT)
Dept: INTERNAL MEDICINE CLINIC | Age: 85
End: 2020-06-04

## 2020-06-04 NOTE — TELEPHONE ENCOUNTER
Left detailed msg per pt's request advising per [facility] to cut amlodipine in 1/2 and monitor BP - to return call with questions and report readings.

## 2020-06-04 NOTE — TELEPHONE ENCOUNTER
Pt's wife called to let Dr. Malika Iqbal know  saw Dr. Lisa Klein today and his b/p was 92/63 and they are wondering if he should hold the amlodipine and then see how things are going next week when he comes in for his [address] Please call. Will be gone from 3:30 to 4:45 this afternoon, but you may leave a message.

## 2020-06-10 ENCOUNTER — OFFICE VISIT (OUTPATIENT)
Dept: INTERNAL MEDICINE CLINIC | Age: 85
End: 2020-06-10

## 2020-06-10 VITALS
BODY MASS INDEX: 22.3 KG/M2 | HEIGHT: 70 IN | RESPIRATION RATE: 16 BRPM | HEART RATE: 60 BPM | WEIGHT: 155.8 LBS | OXYGEN SATURATION: 96 % | DIASTOLIC BLOOD PRESSURE: 80 MMHG | TEMPERATURE: 97.8 F | SYSTOLIC BLOOD PRESSURE: 120 MMHG

## 2020-06-10 DIAGNOSIS — Z00.00 MEDICARE ANNUAL WELLNESS VISIT, SUBSEQUENT: Primary | ICD-10-CM

## 2020-06-10 DIAGNOSIS — K22.5 ZENKER'S DIVERTICULUM: ICD-10-CM

## 2020-06-10 DIAGNOSIS — E03.4 HYPOTHYROIDISM DUE TO ACQUIRED ATROPHY OF THYROID: ICD-10-CM

## 2020-06-10 DIAGNOSIS — I10 ESSENTIAL HYPERTENSION: ICD-10-CM

## 2020-06-10 DIAGNOSIS — S81.801D LEG WOUND, RIGHT, SUBSEQUENT ENCOUNTER: ICD-10-CM

## 2020-06-10 DIAGNOSIS — I10 ESSENTIAL HYPERTENSION WITH GOAL BLOOD PRESSURE LESS THAN 140/90: ICD-10-CM

## 2020-06-10 RX ORDER — AMLODIPINE BESYLATE 10 MG/1
5 TABLET ORAL DAILY
Qty: 90 TAB | Refills: 0 | COMMUNITY
Start: 2020-06-10

## 2020-06-10 RX ORDER — NIACINAMIDE 500 MG
500 TABLET ORAL DAILY
COMMUNITY
Start: 2020-06-10

## 2020-06-10 NOTE — PROGRESS NOTES
This is the Subsequent Medicare Annual Wellness Exam, performed 12 months or more after the Initial AWV or the last Subsequent AWV I have reviewed the patient's medical history in detail and updated the computerized patient record. As well as for a follow-up visit. He has recently seen the pulmonologist who felt he was doing well. He is also seen the oncologist and had blood work done there. The blood work is unavailable to us but they were told it was fine. He recently also saw dermatology and has a biopsy site from his right lower leg as well as multiple areas that were treated with liquid nitrogen on his arm. He just had a barium swallow done as well that was apparently fine. His appetite remains okay. His weight does continue to be a challenge. No headaches. No dizziness. No nosebleeds. Does have difficulty swallowing water. As long as he maintains a regular diet he does not have any choking spells. No change in bowel or bladder habits. He has nocturia about 1 time per night. History Patient Active Problem List  
Diagnosis Code  CLL (chronic lymphocytic leukemia) (formerly [facility]) C91.10  Hypercholesterolemia E78.00  Hypertension I10  
 Hypothyroidism E03.9  Pulmonary embolism (formerly [facility]) I26.99  
 GI bleed K92.2  
 S/P IVC filter S14.759  Advanced care planning/counseling discussion Z71.89  Zenker's diverticulum K22.5 Past Medical History:  
Diagnosis Date  Asthma   
 outgrew this per pt  Autoimmune disease ([address] CLL  Cancer ([address] 6/20/14 MULTIPLE SCCAs EXCISED, ALSO BCCAs  CLL (chronic lymphocytic leukemia) ([address] 2 infusions/states he no longer has this  GERD (gastroesophageal reflux disease)  Hypercholesterolemia  Hypertension  Liver disease 6/6/2014 ADMITTED FOR LIVER DYSFUNCTION, NOT DIAGNOSED, NOW RESOLVED  Pulmonary embolism ([address] 9/2015  Thyroid disease  Unspecified adverse effect of anesthesia \"out of it for 2 weeks\"  Unspecified sleep apnea   
 had sleep study but no follow up Past Surgical History:  
Procedure Laterality Date  HX ABDOMINAL LAPAROSCOPY  4/2016  HX COLONOSCOPY X2, MOST RECNT WAS IN 2013  HX HEENT    
 tonsillectomy  HX HERNIA REPAIR  4/13/16  
 paraesophageal hernia repair Dr Leticia Brown  HX ORTHOPAEDIC Left Frozen shoulder manipulation  HX OTHER SURGICAL    
 moh's procedures - face  [place] REVISION OF LOWER EYELID Current Outpatient Medications Medication Sig Dispense Refill  amLODIPine (NORVASC) 10 mg tablet Take 1 tablet by mouth once daily (Patient taking differently: . Patient taking 1/2 pill daily) 90 Tab 0  
 levothyroxine (SYNTHROID) 175 mcg tablet Take 1 Tab by mouth Daily (before breakfast). Dose adjustment 6/12 90 Tab 0  
 hydroCHLOROthiazide (HYDRODIURIL) 25 mg tablet Take 1 tablet by mouth once daily 90 Tab 0  
 aspirin 81 mg chewable tablet Take 81 mg by mouth daily.  omeprazole (PRILOSEC) 40 mg capsule Take 40 mg by mouth daily.  Lactobacillus acidophilus (PROBIOTIC PO) Take 1 Cap by mouth daily.  cyanocobalamin (VITAMIN B-12) 1,000 mcg sublingual tablet Take 1,000 mcg by mouth daily.  co-enzyme Q-10 (CO Q-10) 100 mg capsule Take 100 mg by mouth daily.  MEN'S MULTI-VITAMIN PO Take 2 Each by mouth daily. Chews 2 gummies po once daily.  cholecalciferol, vitamin D3, (VITAMIN D3) 2,000 unit tab Take 1 tablet by mouth daily.  pravastatin (PRAVACHOL) 20 mg tablet TAKE 1 TABLET BY MOUTH NIGHTLY 90 Tab 1  
 OTHER Combination of Emu oil and pure Hemp Oil extract. 4 drops per day. Theramu. Allergies Allergen Reactions  Other Food Other (comments) AVOIDS OTHER SPICES IN GENERAL DUE SEVERE REACTIONS TO OREGANO AND MUSTARD, crury, worschester  Mustard Anaphylaxis THROAT CLOSES  
 [place] Anaphylaxis THROAT CLOSES  Zithromax [Azithromycin] Anaphylaxis  Pcn [Penicillins] Unable to [employer] [place] Unsure of reaction.  Watermelon Swelling Lips swelling Family History Problem Relation Age of Onset  Heart Disease Mother  Heart Failure Mother  Heart Disease Father  Parkinson's Disease Father  Pneumonia Father  Cancer Sister Lung CA??? - SMOKER  
 Heart Disease Sister  Anesth Problems Neg Hx Social History Tobacco Use  Smoking status: Never Smoker  Smokeless tobacco: Never Used Substance Use Topics  Alcohol use: Yes Comment: 1 oz per day. ROS - Per HPI Physical Examination: General appearance - alert, well appearing, and in no distress Ears - bilateral TM's and external ear canals normal 
Mouth - mucous membranes moist, pharynx normal without lesions Neck - supple, no significant adenopathy Lymphatics - no palpable lymphadenopathy, no hepatosplenomegaly Chest - clear to auscultation, no wheezes, rales or rhonchi, symmetric air entry Heart - normal rate and regular rhythm Abdomen - soft, nontender, nondistended, no masses or organomegaly Extremities - peripheral pulses normal, no pedal edema, no clubbing or cyanosis Skin -he has a biopsy site on the right anterior shin. Mild surrounding erythema and swelling. No purulence. No tenderness. Depression Risk Factor Screening:  
 
3 most recent PHQ Screens 6/10/2020 Little interest or pleasure in doing things Not at all Feeling down, depressed, irritable, or hopeless Not at all Total Score PHQ 2 0 Alcohol Risk Factor Screening (MALE > 65): Do you average more 1 drink per night or more than 7 drinks a week: No 
 
In the past three months have you have had more than 4 drinks containing alcohol on one occasion: No 
 
 
Functional Ability and Level of Safety:  
Hearing: Hearing is good. Activities of Daily Living: The home contains: no safety equipment. Patient does total self care Ambulation: with no difficulty Fall Risk: 
Fall Risk Assessment, last 12 mths 6/10/2020 Able to walk? Yes Fall in past 12 months? No  
 
 
Abuse Screen: 
Patient is not abused Cognitive Screening Has your family/caregiver stated any concerns about your memory: no 
 
 
Patient Care Team  
Patient Care Team: 
Judith Wood MD as PCP - General (Internal Medicine) Judith Wood MD as PCP [facility] Christina Valero MD (Ophthalmology) Alfredo Iverson MD (Dermatology) Yifan Seth MD as Surgeon (General Surgery) Assessment/Plan Education and counseling provided: 
Are appropriate based on today's review and evaluation End-of-Life planning (with patient's consent) Influenza Vaccine Diagnoses and all orders for this visit: 
 
1. Medicare annual wellness visit, subsequent 2. Essential hypertension with goal blood pressure less than 140/90 -blood pressure was recently low. His amlodipine was reduced to 5 mg. He has had no symptoms associated with this. We will continue those meds for now. 3. Zenker's diverticulum -maintain a soft diet. He will let me know if this problematic. 4. Hypothyroidism due to acquired atrophy of thyroid -we will get his lab result from his oncologist.  If thyroid was not checked, will order that. 5. Essential hypertension 6. Leg wound, right, subsequent encounter -we will treat with wound care with an Ace bandage. Continue follow-up with dermatology. 7.  CLLstable and follow-up with oncology. Health Maintenance Due Topic Date Due  Shingrix Vaccine Age 50> (1 of 2) 03/17/1981  GLAUCOMA SCREENING Q2Y  06/03/2019  Medicare Yearly Exam  06/10/2020  Lipid Screen  06/10/2020

## 2020-06-10 NOTE — PATIENT INSTRUCTIONS
Medicare Wellness Visit, Male The best way to live healthy is to have a lifestyle where you eat a well-balanced diet, exercise regularly, limit alcohol use, and quit all forms of tobacco/nicotine, if applicable. Regular preventive services are another way to keep healthy. Preventive services (vaccines, screening tests, monitoring & exams) can help personalize your care plan, which helps you manage your own care. Screening tests can find health problems at the earliest stages, when they are easiest to treat. [facility] follows the current, evidence-based guidelines published by the [facility] Norm ([facility]) when recommending preventive services for our patients. Because we follow these guidelines, sometimes recommendations change over time as research supports it. (For example, a prostate screening blood test is no longer routinely recommended for men with no symptoms). Of course, you and your doctor may decide to screen more often for some diseases, based on your risk and co-morbidities (chronic disease you are already diagnosed with). Preventive services for you include: - Medicare offers their members a free annual wellness visit, which is time for you and your primary care provider to discuss and plan for your preventive service needs. Take advantage of this benefit every year! 
-All adults over age 72 should receive the recommended pneumonia vaccines. Current USPSTF guidelines recommend a series of two vaccines for the best pneumonia protection.  
-All adults should have a flu vaccine yearly and tetanus vaccine every 10 years. 
-All adults age 48 and older should receive the shingles vaccines (series of two vaccines).       
-All adults age 38-68 who are overweight should have a diabetes screening test once every three years.  
-Other screening tests & preventive services for persons with diabetes include: an eye exam to screen for diabetic retinopathy, a kidney function test, a foot exam, and stricter control over your cholesterol.  
-Cardiovascular screening for adults with routine risk involves an electrocardiogram (ECG) at intervals determined by the provider.  
-Colorectal cancer screening should be done for adults age 54-65 with no increased risk factors for colorectal cancer. There are a number of acceptable methods of screening for this type of cancer. Each test has its own benefits and drawbacks. Discuss with your provider what is most appropriate for you during your annual wellness visit. The different tests include: colonoscopy (considered the best screening method), a fecal occult blood test, a fecal DNA test, and sigmoidoscopy. 
-All adults born between [facility] should be screened once for Hepatitis C. 
-An Abdominal Aortic Aneurysm (AAA) Screening is recommended for men age 73-68 who has ever smoked in their lifetime. Here is a list of your current Health Maintenance items (your personalized list of preventive services) with a due date: 
Health Maintenance Due Topic Date Due  Shingles Vaccine (1 of 2) 03/17/1981  Glaucoma Screening   06/03/2019 Xochitl Annual Well Visit  06/10/2020  Cholesterol Test   06/10/2020

## 2020-06-10 NOTE — PROGRESS NOTES
Chief Complaint Patient presents with [facility] Annual Wellness Visit Reviewed record in preparation for visit and have obtained necessary documentation. Identified pt with two pt identifiers(name and ). Health Maintenance Due Topic  Shingrix Vaccine Age 50> (1 of 2)  GLAUCOMA SCREENING Q2Y  Medicare Yearly Exam   
 Lipid Screen Chief Complaint Patient presents with [facility] Annual Wellness Visit Wt Readings from Last 3 Encounters:  
06/10/20 155 lb 12.8 oz (70.7 kg) 06/10/19 159 lb 6 oz (72.3 kg) 19 155 lb 10.3 oz (70.6 kg) Temp Readings from Last 3 Encounters:  
06/10/20 97.8 °F (36.6 °C) (Oral) 06/10/19 98.2 °F (36.8 °C) (Oral) 19 98 °F (36.7 °C) BP Readings from Last 3 Encounters:  
06/10/20 120/80  
06/10/19 175/68  
19 116/65 Pulse Readings from Last 3 Encounters:  
06/10/20 60  
06/10/19 (!) 55  
19 (!) 49 Learning Assessment: 
:  
 
Learning Assessment 2/10/2014 PRIMARY LEARNER Patient HIGHEST LEVEL OF EDUCATION - PRIMARY LEARNER  4 YEARS OF COLLEGE  
BARRIERS PRIMARY LEARNER NONE  
CO-LEARNER CAREGIVER No  
PRIMARY LANGUAGE ENGLISH  
LEARNER PREFERENCE PRIMARY VIDEOS  
ANSWERED BY patient RELATIONSHIP SELF Depression Screening: 
:  
 
3 most recent PHQ Screens 6/10/2020 Little interest or pleasure in doing things Not at all Feeling down, depressed, irritable, or hopeless Not at all Total Score PHQ 2 0 Fall Risk Assessment: 
:  
 
Fall Risk Assessment, last 12 mths 6/10/2020 Able to walk? Yes Fall in past 12 months? No  
 
 
Abuse Screening: 
:  
 
No flowsheet data found.  
 
Coordination of Care Questionnaire: 
:  
 
1) Have you been to an emergency room, urgent care clinic since your last visit? no  
Hospitalized since your last visit? no          
 
2) Have you seen or consulted any other health care providers outside of [facility] since your last visit? yes  (Include any pap smears or colon screenings in this section.) 3) Do you have an Advance Directive on file? yes 4) Are you interested in receiving information on Advance Directives? NO Patient is accompanied by nursing attendant and spouse I have received verbal consent from Corina Stoddard to discuss any/all medical information while they are present in the room. Reviewed record  In preparation for visit and have obtained necessary documentation.

## 2020-06-11 DIAGNOSIS — E03.4 HYPOTHYROIDISM DUE TO ACQUIRED ATROPHY OF THYROID: Primary | ICD-10-CM

## 2020-06-19 ENCOUNTER — HOSPITAL ENCOUNTER (OUTPATIENT)
Dept: LAB | Age: 85
Discharge: HOME | End: 2020-06-19
Payer: MEDICARE

## 2020-06-19 PROCEDURE — 84443 ASSAY THYROID STIM HORMONE: CPT

## 2020-06-19 PROCEDURE — 36415 COLL VENOUS BLD VENIPUNCTURE: CPT

## 2020-06-20 LAB
T4 FREE SERPL-MCNC: 1.94 NG/DL (ref 0.82–1.77)
TSH SERPL DL<=0.005 MIU/L-ACNC: 0.16 UIU/ML (ref 0.45–4.5)

## 2020-06-22 ENCOUNTER — TELEPHONE (OUTPATIENT)
Dept: INTERNAL MEDICINE CLINIC | Age: 85
End: 2020-06-22

## 2020-06-22 DIAGNOSIS — E03.4 HYPOTHYROIDISM DUE TO ACQUIRED ATROPHY OF THYROID: Primary | ICD-10-CM

## 2020-06-22 RX ORDER — LEVOTHYROXINE SODIUM 150 UG/1
150 TABLET ORAL
Qty: 90 TAB | Refills: 1 | Status: SHIPPED | OUTPATIENT
Start: 2020-06-22

## 2020-06-22 NOTE — TELEPHONE ENCOUNTER
----- Message from Mercedes Crigler, MD sent at 6/21/2020  7:51 PM EDT -----  Reduce the synthroid to 150 and lab in 6 months.

## 2020-06-22 NOTE — TELEPHONE ENCOUNTER
Orders Placed This Encounter    levothyroxine (SYNTHROID) 150 mcg tablet     Sig: Take 1 Tab by mouth Daily (before breakfast). Dispense:  90 Tab     Refill:  1     The above orders were approved via [facility] per Dr. Thibodeaux Sep, III.

## 2020-08-09 PROBLEM — R41.0 ACUTE DELIRIUM: Status: ACTIVE | Noted: 2020-01-01

## 2020-08-09 NOTE — ACP (ADVANCE CARE PLANNING)
Advance Care Planning Advance Care Planning Activator (Inpatient) Conversation Note Date of ACP Conversation: 08/09/20 Conversation Conducted with: Mary Brock (wife) ACP Activator: Jose Roberts RN 
 
*When Decision Maker makes decisions on behalf of the incapacitated patient: Decision Maker is asked to consider and make decisions based on patient values, known preferences, or best interests. Health Care Decision Maker: 
 
Current Designated Health Care Decision Maker:   Primary Decision Maker: David Rosenbaum - Spouse - [phone] Secondary Decision Maker: Carmen Musa - Son - [phone] (If there is a valid [place] named in the [address]\" box in the ACP activity, but it is not visible above, be sure to open that field and then select the health care decision maker relationship (ie \"primary\") in the blank space to the right of the name.) V Care Preferences Ventilation: \"If you were in your present state of health and suddenly became very ill and were unable to breathe on your own, what would your preference be about the use of a ventilator (breathing machine) if it were available to you? \"  Yes Resuscitation \"CPR works best to restart the heart when there is a sudden event, like a heart attack, in someone who is otherwise healthy. Unfortunately, CPR does not typically restart the heart for people who have serious health conditions or who are very sick. \" \"In the event your heart stopped as a result of an underlying serious health condition, would you want attempts to be made to restart your heart  Yes NOTE: If the patient has a valid advance directive AND now provides care preference(s) that are inconsistent with that prior directive, advise the patient to consider either: creating a new advance directive that complies with state-specific requirements; or, if that is not possible, orally revoking that prior directive in accordance with state-specific requirements, which must be documented in the EHR. Length of ACP Conversation in minutes:  10 Conversation Outcomes: 
[x] ACP discussion completed 
[] Existing advance directive reviewed with patient; no changes to patient's previously recorded wishes 
 
 [] New Advance Directive completed 
 [] Portable Do Not Resuscitate prepared for Provider review and signature 
[] POLST/POST/MOLST/MOST prepared for Provider review and signature Follow-up plan:   
[] Schedule follow-up conversation to continue planning 
[] Referred individual to Provider for additional questions/concerns  
[] Advised patient/agent/surrogate to review completed ACP document and update if needed with changes in condition, patient preferences or care setting  
 
[] This note routed to one or more involved healthcare providers

## 2020-08-09 NOTE — ED NOTES
[id number] PerfectServed Dr. Baer Retort to clarify fluid orders. Verbal order to subtract 500 mL from the 2,109 mL fluid resuscitation order that was put in since pt has already received 500 mL from an order from ED provider. [id number] Dr. Baer Retort at bedside.

## 2020-08-09 NOTE — ED PROVIDER NOTES
80-year-old gentleman brought in by EMS from home today after episodes of weakness. 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 Patient is unable to give significant history tells me that he feels fine. There is reported that the patient was doing well and had dinner last evening with an episode of nausea and vomiting after dinner. Last night he slid out of bed, without striking his head, and is weak enough that EMS was called to help get him up. This happened a second time, and EMS was called, and he was transported to the hospital for further evaluation and management. The history is provided by the patient, the spouse and the EMS personnel. Lethargy This is a new problem. Episode onset: Last night. The problem occurs constantly. The problem has not changed since onset. Pertinent negatives include no chest pain, no abdominal pain, no headaches and no shortness of breath. Past Medical History:  
Diagnosis Date  Asthma   
 outgrew this per pt  Autoimmune disease ([address] CLL  Cancer ([address] 6/20/14 MULTIPLE SCCAs EXCISED, ALSO BCCAs  CLL (chronic lymphocytic leukemia) ([address] 2 infusions/states he no longer has this  GERD (gastroesophageal reflux disease)  Hypercholesterolemia  Hypertension  Liver disease 6/6/2014 ADMITTED FOR LIVER DYSFUNCTION, NOT DIAGNOSED, NOW RESOLVED  Pulmonary embolism ([address] 9/2015  Thyroid disease  Unspecified adverse effect of anesthesia \"out of it for 2 weeks\"  Unspecified sleep apnea   
 had sleep study but no follow up Past Surgical History:  
Procedure Laterality Date  HX ABDOMINAL LAPAROSCOPY  4/2016  HX COLONOSCOPY X2, MOST RECNT WAS IN 2013  HX HEENT    
 tonsillectomy  HX HERNIA REPAIR  4/13/16  
 paraesophageal hernia repair Dr He Lomeli  HX ORTHOPAEDIC Left Frozen shoulder manipulation  HX OTHER SURGICAL moh's procedures - face  [place] REVISION OF LOWER EYELID Family History:  
Problem Relation Age of Onset  Heart Disease Mother  Heart Failure Mother  Heart Disease Father  Parkinson's Disease Father  Pneumonia Father  Cancer Sister Lung CA??? - SMOKER  
 Heart Disease Sister  Anesth Problems Neg Hx Social History Socioeconomic History  Marital status:  Spouse name: Not on file  Number of children: Not on file  Years of education: Not on file  Highest education level: Not on file Occupational History  Not on file Social Needs  Financial resource strain: Not on file  Food insecurity Worry: Not on file Inability: Not on file  Transportation needs Medical: Not on file Non-medical: Not on file Tobacco Use  Smoking status: Never Smoker  Smokeless tobacco: Never Used Substance and Sexual Activity  Alcohol use: Yes Comment: 1 oz per day.  Drug use: No  
 Sexual activity: Yes Lifestyle  Physical activity Days per week: Not on file Minutes per session: Not on file  Stress: Not on file Relationships  Social connections Talks on phone: Not on file Gets together: Not on file Attends [employer] service: Not on file Active member of club or organization: Not on file Attends meetings of clubs or organizations: Not on file Relationship status: Not on file  Intimate partner violence Fear of current or ex partner: Not on file Emotionally abused: Not on file Physically abused: Not on file Forced sexual activity: Not on file Other Topics Concern  Not on file Social History Narrative  Not on file ALLERGIES: Other food; Mustard; Oregano; Zithromax [azithromycin]; Pcn [penicillins]; and Watermelon Review of Systems Constitutional: Negative for fatigue and fever. HENT: Negative for sneezing and sore throat. Respiratory: Negative for cough and shortness of breath. Cardiovascular: Negative for chest pain and leg swelling. Gastrointestinal: Positive for nausea and vomiting. Negative for abdominal pain and diarrhea. Genitourinary: Negative for difficulty urinating and dysuria. Musculoskeletal: Negative for arthralgias and myalgias. Skin: Negative for color change and rash. Neurological: Positive for weakness. Negative for headaches. Psychiatric/Behavioral: Negative for agitation and behavioral problems. Vitals:  
 08/09/20 0305 BP: 130/56 Pulse: 95 Resp: 16 Temp: (!) 100.5 °F (38.1 °C) SpO2: 93% Physical Exam 
Vitals signs and nursing note reviewed. Constitutional:   
   General: He is not in acute distress. Appearance: He is well-developed and normal weight. HENT:  
   Head: Normocephalic and atraumatic. Mouth/Throat:  
   Mouth: Mucous membranes are moist.  
   Pharynx: Oropharynx is clear. Eyes:  
   Extraocular Movements: Extraocular movements intact. Pupils: Pupils are equal, round, and reactive to light. Neck: Musculoskeletal: Normal range of motion and neck supple. Cardiovascular:  
   Rate and Rhythm: Normal rate and regular rhythm. Pulses: Normal pulses. Heart sounds: No murmur. Pulmonary:  
   Effort: Pulmonary effort is normal.  
   Breath sounds: Normal breath sounds. Chest:  
   Chest wall: No mass or tenderness. Abdominal:  
   Palpations: Abdomen is soft. Tenderness: There is no abdominal tenderness. There is no guarding or rebound. Musculoskeletal:  
   Right lower leg: He exhibits no tenderness. No edema. Left lower leg: He exhibits no tenderness. No edema. Lymphadenopathy:  
   Cervical: No cervical adenopathy. Skin: 
   General: Skin is warm and dry. Capillary Refill: Capillary refill takes less than 2 seconds. Neurological:  
   General: No focal deficit present. Mental Status: He is alert and oriented to person, place, and time. Psychiatric:     
   Mood and Affect: Mood normal.     
   Behavior: Behavior normal.  
 
  
 
MDM Number of Diagnoses or Management Options Diagnosis management comments: 75-year-old gentleman presents as above with findings concerning for infection of unknown origin. He does not appear meningitic does not have exam findings consistent with meningitis. This point have not yet been able obtain urine, but given the prevalence of UTI I will begin treatment for sepsis of unknown origin and admit the patient to the hospital.  I had a discussion with the patient and his wife about his penicillin allergy, they are unsure if he really is allergic or not. Will order cefepime and Levaquin and monitor. Amount and/or Complexity of Data Reviewed Clinical lab tests: reviewed Tests in the radiology section of CPT®: reviewed Tests in the medicine section of CPT®: reviewed Risk of Complications, Morbidity, and/or Mortality Presenting problems: moderate Management options: high Patient Progress Patient progress: stable Procedures Rhythm: normal sinus rhythm. Rate (approx.): 91. Axis: Left axis. ST segment:  No concerning ST elevations or depressions. This EKG was interpreted by Arturo Ham MD,ED Provider. Hospitalist [facility] for Admission 5:47 AM 
 
ED Room Number: ED60/86 Patient Name and age:  Andreas Jones 80 y.o.  male Working Diagnosis: No diagnosis found. COVID-19 Suspicion:  no 
 
Code Status:  Full Code in ED Readmission: no 
Isolation Requirements:  no 
Recommended Level of Care:  med/surg Department:[facility] Adult ED - [phone] Other: Sepsis of unknown origin, hemodynamically stable, suspect UTI.

## 2020-08-09 NOTE — PROGRESS NOTES
TRANSFER - IN REPORT: 
 
Verbal report received from Clari Monet (name) on Mike Guardadoford  being received from ED (unit) for routine progression of care Report consisted of patients Situation, Background, Assessment and  
Recommendations(SBAR). Information from the following report(s) SBAR, Kardex and ED Summary was reviewed with the receiving nurse. Opportunity for questions and clarification was provided. Assessment completed upon patients arrival to unit and care assumed. Verbal shift change report given to Leta RN (oncoming nurse) by Lisa Bull RN (offgoing nurse). Report included the following information SBAR, Kardex and ED Summary.

## 2020-08-09 NOTE — ED TRIAGE NOTES
Pt arrives via EMS w/ CC of increased weakness/fatigue after throwing up roughly 1 hour ago. Pt's wife reports pt was shaking after throwing up. She was concerned so she called 911. Pt lost bladder control during emesis episode. VSS per EMS, slightly hypertensive immediately after throwing up. Afebrile per EMS, oral temp upon hospital arrival 100.5. Patient oriented to self, place, and time. Somewhat disoriented to situation.

## 2020-08-09 NOTE — PROGRESS NOTES
Pharmacist Note - Vancomycin Dosing Consult provided for this 80 y.o. male for indication of sepsis. Antibiotic regimen(s): Cefepime and Levaquin x1 per ED Md and Vancomycin Patient on vancomycin PTA? NO Recent Labs 20 
0320 WBC 16.5*  
CREA 0.99 BUN 19 Frequency of BMP: daily Height: 177.8 cm Weight: 70 kg Est CrCl: 50 ml/min Temp (24hrs), Av.5 °F (38.1 °C), Min:100.5 °F (38.1 °C), Max:100.5 °F (38.1 °C) Cultures: 
blood Goal trough = 15 - 20 mcg/mL Therapy will be initiated with a loading dose of 1500 mg IV x 1 to be followed by a maintenance dose of 1000 mg IV every 16 hours. Pharmacy to follow patient daily and order levels / make dose adjustments as appropriate.

## 2020-08-09 NOTE — PROGRESS NOTES
Patient seen and examined. H&P reviewed. Altered mental status 
-CT scan of the head negative,  MRI pending 
-Likely metabolic encephalopathy due to sepsis 
-Has baseline dementia Sepsis 
-Patient with fever and leukocytosis 
-Unclear source at this time 
-CT abdomen and pelvis shows no acute pathology 
-CTA of the chest is pending 
-Check urinalysis, follow blood cultures 
-Continue empiric antibiotics Elevated troponin 
-Likely from demand ischemia from sepsis -Trend troponin, if trending up, consider cardiology consult Elevated BNP 
-Check echocardiogram 
 
Thrombocytopenia 
-Likely from sepsis -Monitor CBC Hypertension 
-Stable Hypothyroidism 
-Continue Synthroid Chronic lymphocytic leukemia 
-This is in remission Obstructive sleep apnea 
-CPAP at bedtime

## 2020-08-09 NOTE — PROGRESS NOTES
Problem: Pressure Injury - Risk of 
Goal: *Prevention of pressure injury Description: Document Biju Scale and appropriate interventions in the flowsheet. Outcome: Progressing Towards Goal 
Note: Pressure Injury Interventions: Activity Interventions: Assess need for specialty bed, Increase time out of bed, PT/OT evaluation Mobility Interventions: Assess need for specialty bed, Float heels, PT/OT evaluation, Turn and reposition approx. every two hours(pillow and wedges) Nutrition Interventions: Document food/fluid/supplement intake, Discuss nutritional consult with provider Friction and Shear Interventions: Apply protective barrier, creams and emollients, Foam dressings/transparent film/skin sealants, Lift sheet, Lift team/patient mobility team, Minimize layers Problem: Patient Education: Go to Patient Education Activity Goal: Patient/Family Education Outcome: Progressing Towards Goal 
  
Problem: Falls - Risk of 
Goal: *Absence of Falls Description: Document Tildio Pickles Fall Risk and appropriate interventions in the flowsheet. Outcome: Progressing Towards Goal 
Note: Fall Risk Interventions: 
 Medication Interventions: Assess postural VS orthostatic hypotension, Evaluate medications/consider consulting pharmacy, Patient to call before getting OOB Elimination Interventions: Call light in reach, Elevated toilet seat, Patient to call for help with toileting needs, Stay With Me (per policy) History of Falls Interventions: Consult care management for discharge planning, Door open when patient unattended, Evaluate medications/consider consulting pharmacy, Investigate reason for fall Bedside and Verbal shift change report given to Michael (oncoming nurse) by Jose Carlos Jc (offgoing nurse). Report included the following information SBAR, Kardex, MAR, Recent Results and Cardiac Rhythm NSR. Patient Vitals for the past 12 hrs: 
 Temp Pulse Resp BP SpO2 08/09/20 1909 97.3 °F (36.3 °C) 60 18 91/47 96 % 08/09/20 1521 98.4 °F (36.9 °C) 63 16 96/60 96 % 08/09/20 1200     99 % 08/09/20 1124 99.3 °F (37.4 °C) 64 21 106/58 97 % 08/09/20 0800     95 % 08/09/20 0739 99.1 °F (37.3 °C) 73 20 127/72 99 %

## 2020-08-09 NOTE — PROGRESS NOTES
BRITTANY 
Patient presented to ED from home with increased weakness, vomiting and fever 100.5. Admitted with acute delirium, possibly r/t sepsis. COVID pending RUR 13% Disposition : TBD pending medical progress. Plan for utilizing home health:   TBD, patient has used [facility] in the past.    
 
PCP: First and Last name: Dr Ajay Gamez [facility] Are you a current patient: Yes/No: Yes Approximate date of last visit: 6/10/2020 Can you participate in a virtual visit with your PCP: Unknown Current Advanced Directive/Advance Care Plan: On file, wife Deborah Done [phone], Brigitte Boxer [phone] are [facility]'s Care manager spoke with patient's wife Christine Crowe to introduce self and explain role. Per wife patient is independent, she drives him to his appointments. Patient has no previous equipment needs, has used [facility] for wound care in the past post op eye surgery. Care management will follow for transitions of care. Mago Olivas RN,CRM Care Management Interventions PCP Verified by CM: Yes(Dr Ajay Gamez ) Last Visit to PCP: 06/10/20 MyChart Signup: Yes Discharge Durable Medical Equipment: No 
Physical Therapy Consult: No 
Speech Therapy Consult: No 
Current Support Network: Lives with Spouse(wife Deborah Done [phone])

## 2020-08-09 NOTE — H&P
[address] [facility] HISTORY AND PHYSICAL Name:  Josefina Anand 
MR#:  [id number] :  1931 ACCOUNT #:  [de-identified] ADMIT DATE:  2020 The patient was seen, evaluated, and admitted by me on 2020. PRIMARY CARE PHYSICIAN:  Eamon Corley MD 
 
SOURCE OF INFORMATION:  The patient, who is not a good historian, ED medical records, and old medical records. CHIEF COMPLAINT:  Weakness. HISTORY OF PRESENT ILLNESS:  This is an 78-year-old man with a past medical history significant for dementia, hypothyroidism, hypertension, obstructive sleep apnea, chronic lymphocytic leukemia in remission, who was in his usual state of health until the day of presentation at the emergency room when the patient developed weakness. The weakness is progressive and getting worse. As a result of the weakness, the patient slid off his bed twice. No head injury. For the second time, the patient was not able to get up from the floor by himself, because of that, his wife called EMS. It was also reported that the patient developed nausea and vomiting shortly after dinner, but the patient denies abdominal pain. According to report, the patient is more confused than usual and was recently diagnosed with dementia. The patient was brought to the emergency room for further evaluation. When the patient arrived at the emergency room, CT scan of the abdomen and pelvis was obtained. This was negative. CT scan of the head was also negative. The patient presented with fever and leukocytosis. The emergency room physician suspected sepsis of unknown origin and referred the patient to the hospitalist service for evaluation for admission. He was last admitted to this hospital from 2016 to 2016. The patient was admitted with GI bleed. He was on Xarelto for pulmonary embolism at that time. The Xarelto was discontinued, because of the GI bleed. PAST MEDICAL HISTORY:  Dementia, hypothyroidism, hypertension, obstructive sleep apnea, chronic lymphocytic leukemia. ALLERGIES:  THE PATIENT IS ALLERGIC TO PENICILLIN, WATERMELON, ZITHROMAX, MUSTARD. MEDICATIONS: 
1.  Norvasc 10 mg half tablet daily. 2.  Aspirin 81 mg daily. 3.  Prilosec 40 mg daily. 4.  Niacinamide 500 mg daily. 5.  Synthroid 150 mcg daily. 6.  Hydrochlorothiazide 25 mg daily. FAMILY HISTORY:  This was reviewed. His mother had heart disease. His father had heart disease and Parkinson's disease. PAST SURGICAL HISTORY:  This is significant for hernia repair, tonsillectomy. SOCIAL HISTORY:  No history of alcohol or tobacco abuse. REVIEW OF SYSTEMS: 
HEAD, EYES, EARS, NOSE, AND THROAT:  This is positive for confusion. No headache, no blurring of vision, no photophobia. RESPIRATORY SYSTEM:  This is positive for cough and shortness of breath. No hemoptysis. CARDIOVASCULAR SYSTEM:  No chest pain, no orthopnea, no palpitation. GASTROINTESTINAL SYSTEM:  This is positive for nausea and vomiting. No diarrhea. No constipation. GENITOURINARY SYSTEM:  No dysuria, no urgency, and no frequency. All other systems are reviewed and they are negative. PHYSICAL EXAMINATION: 
GENERAL APPEARANCE:  The patient appeared ill, in moderate distress. VITAL SIGNS:  On arrival at the emergency room, temperature 100.5, pulse 95, respiratory rate 16, blood pressure 130/56, oxygen saturation 93% on room air. HEENT:  Head:  Normocephalic, atraumatic. Eyes:  Normal eye movement. No redness, no drainage, no discharge. Ears:  Normal external ears with no evidence of drainage. Nose:  No deformity and no drainage. Mouth and Throat:  No visible oral lesion. Dry oral mucosa. NECK:  Neck is supple. No JVD, no thyromegaly. CHEST:  Clear breath sounds. No wheezing, no crackles. HEART:  Normal S1 and S2, regular. No clinically appreciable murmur. ABDOMEN:  Soft, nontender. Normal bowel sounds. CNS:  Alert and oriented to person and place. No gross focal neurological deficit. EXTREMITIES:  No edema. Pulses 2+ bilaterally. MUSCULOSKELETAL SYSTEM:  No evidence of joint deformity or swelling. SKIN:  No active skin lesions seen in the exposed part of the body. PSYCHIATRY:  Unable to assess mood and affect. LYMPHATIC SYSTEM:  No cervical lymphadenopathy. DIAGNOSTIC DATA:  EKG shows sinus rhythm and nonspecific ST and T-waves abnormalities. Chest x-ray, no acute findings. CT scan of the head without contrast, no acute findings. CT scan of the abdomen and pelvis with contrast, no acute findings. LABORATORY DATA:  Hematology:  WBC 16.5, hemoglobin at 13.4, hematocrit 39.6, platelets 024. Chemistry:  Sodium 136, potassium 3.5, chloride 102, CO2 27, glucose 138, BUN 19, creatinine 0.99, calcium 9.4, total bilirubin 0.8, ALT 18, AST 16, alkaline phosphatase 52, total protein at 6.7, albumin level 3.5, globulin at 3.2. Lactic acid level 0.9. ASSESSMENT: 
1. Acute delirium. 2.  Suspected sepsis. 3.  Dementia. 4.  Hypothyroidism. 5.  Hypertension. 6.  Chronic lymphocytic leukemia. 7.  Obstructive sleep apnea. 8.  Hyperglycemia. 9.  Thrombocytopenia. PLAN: 
1. Acute delirium. We will admit the patient for further evaluation and treatment. This is most likely due to metabolic event such as sepsis. We will identify and treat underlying etiological factors. CT scan of the head is negative. We will obtain MRI of the brain to evaluate the patient for stroke as a possible cause of acute delirium. We will monitor the patient closely. 2.  Suspected sepsis. This is most likely contributing to the patient's acute delirium. We will start the patient on cefepime and vancomycin. We will obtain urine culture. We will also obtain blood culture.   We will obtain a CT scan of the abdomen and pelvis to evaluate the patient for pneumonia as a possible source of sepsis. 3.  Dementia. We will continue supportive treatment. We will await results of MRI of the brain. We will check W06 and folic acid level. 4.  Hypothyroidism. We will continue with Synthroid. We will check a TSH level. 5.  Hypertension. We will hold antihypertensive medication in the setting of sepsis with potential for hypotension and monitor the patient's blood pressure closely. 6.  Chronic lymphocytic leukemia. This is in remission. 7.  Obstructive sleep apnea. We will place the patient on CPAP with home setting. 8.  Hyperglycemia. We will check hemoglobin A1c level. The patient has no history of diabetes. 9.  Thrombocytopenia. The patient is asymptomatic. This is mild. We will monitor the patient's platelet count. 10.  Other Issues:  Code Status: The patient is a full code. We will place the patient on Lovenox for DVT prophylaxis. If there is a further drop in the patient's platelet count, we will discontinue Lovenox and request SCD for DVT prophylaxis. SEPSIS REASSESSMENT COMPLETED:  The patient has normal capillary refill, improved cardiopulmonary examination and moist mucous membrane. FUNCTIONAL STATUS PRIOR TO ADMISSION:  The patient came from home. The patient stated that he is ambulatory with no assistant or device. COVID PRECAUTION:  The patient was wearing a face mask. I was wearing a face mask, gloves, and cap for this patient's encounter. The patient will be tested for COVID-19 virus infection, because of the associated cough and fever that the patient presented with. MD LINCOLN [id number] 
D:  08/09/2020 6:51 
T:  08/09/2020 8:36 JOB #:  [id number] CC:  Todd Boucher MD

## 2020-08-09 NOTE — ED NOTES
Told pt we needed a urine sample. He said he would try to urinate in the urinal. Was not able to urinate, but stated he would try again soon.

## 2020-08-09 NOTE — ROUTINE PROCESS
TRANSFER - OUT REPORT: 
 
Verbal report given to Bonnie RN (name) on Mike Anglin  being transferred to [facility] 4E (unit) for routine progression of care Report consisted of patients Situation, Background, Assessment and  
Recommendations(SBAR). Information from the following report(s) SBAR, ED Summary, [facility] and Recent Results was reviewed with the receiving nurse. Lines:  
Peripheral IV 08/09/20 Right Antecubital (Active) Site Assessment Clean, dry, & intact 08/09/20 8281 Phlebitis Assessment 0 08/09/20 0311 Infiltration Assessment 0 08/09/20 0311 Dressing Status Clean, dry, & intact 08/09/20 0295 Dressing Type Transparent 08/09/20 0311 Hub Color/Line Status Green;Flushed;Patent 08/09/20 6722 Action Taken Blood drawn 08/09/20 0311 Peripheral IV 08/09/20 Left; Inner; Upper Forearm (Active) Site Assessment Clean, dry, & intact 08/09/20 3913 Phlebitis Assessment 0 08/09/20 0318 Infiltration Assessment 0 08/09/20 0318 Dressing Type Transparent;Tape 08/09/20 0318 Hub Color/Line Status Green;Patent; Flushed 08/09/20 0614 Action Taken Blood drawn 08/09/20 0318 Opportunity for questions and clarification was provided. Patient transported with: 
 Registered Nurse Visit Vitals /57 (BP 1 Location: Left arm, BP Patient Position: At rest) Pulse 85 Temp 99 °F (37.2 °C) Resp 16 Ht 5' 10\" (1.778 m) Wt 70.3 kg (155 lb) SpO2 99% BMI 22.24 kg/m²

## 2020-08-10 NOTE — PROGRESS NOTES
TRANSFER - IN REPORT: 
 
Verbal report received from Elisa Davila RN(name) on Mike Anglin  being received from [facility]) for routine progression of care Report consisted of patients Situation, Background, Assessment and  
Recommendations(SBAR). Information from the following report(s) SBAR, Kardex and MAR was reviewed with the receiving nurse Judd Ocampo RN. Opportunity for questions and clarification was provided. Assessment completed upon patients arrival to unit and care assumed.

## 2020-08-10 NOTE — CONSULTS
Cardiology Consult Note Patient Name: Richar Cullen  : 3/17/1931 MRN: [id number] Date: 8/10/2020  Time: 9:09 AM 
 
Admit Diagnosis: Acute delirium [R41.0] Primary Cardiologist: Anna Corley MD   Consulting Cardiologist: Ximena Eaton MD 
 
Reason for Consult: troponin elevation Requesting MD: Angélica Campos MD 
 
HPI: 
Ricahr Cullen is a 80 y.o. male admitted on 2020  for Acute delirium [R41.0]. has a past medical history of Asthma, Autoimmune disease ([facility] [address] Cancer ([facility] [address] (14), CLL (chronic lymphocytic leukemia) ([facility] [address] GERD (gastroesophageal reflux disease), Hypercholesterolemia, Hypertension, Liver disease (2014), Pulmonary embolism ([facility] [address] (2015), Thyroid disease, Unspecified adverse effect of anesthesia, and Unspecified sleep apnea. Presented for for weakness. He has baseline dementia. He is a poor historian. Chart reviewed. His weakness has been progressive, getting worse. He ultimately ended up on the floor, unable to get up. EMS was called by his wife. Cardiac h/o HTN, HLD, but no known MI or CHF. Other history includes CLL, GERD, LISANDRA, hypothyroidism. Subjective: He offers no c/o CP or SOB. Still feels weak Assessment and Plan 1. Troponin elevation 
 - minimal and flat - 0.12 -0.16 
 - Asymptomatic 
 - EKG without ACS 
 - suspect related to demand sepsis 2. Elevated proBNP - 4000 
 - CTA of chest pending. No volume overloaded on exam 
 - Echo pending 3. Sepsis - per Primary team 
4. AMS 
 - metabolic 2/2 #3 
5. HTN 
 - stable on home meds 6. CLL 
 - in remission 7. LISANDRA - CPAP compliant Asymptomatic, without EKG changes. Troponin minimal and flat. Suspect demand and no further w/u needed. proBNP is elevated. Echo pending. Will follow up echo results, and proceed from there. Patient Active Problem List  
Diagnosis Code  CLL (chronic lymphocytic leukemia) ([facility]) C91.10  Hypercholesterolemia E78.00  Hypertension I10  
 Hypothyroidism E03.9  Pulmonary embolism ([facility]) I26.99  
 GI bleed K92.2  
 S/P IVC filter D45.798  Advanced care planning/counseling discussion Z71.89  Zenker's diverticulum K22.5  Acute delirium R41.0 No specialty comments available. Review of Systems:   
[] Patient unable to provide secondary to condition 
 
[x] All systems negative, except as checked below. Constitutional:   
[]Weight Change  []Fever   []Chills   []Night Sweats  [x]Fatigue  []Malaise  []____ 
ENT/Mouth:    
[]Hearing Changes  []Ear Pain  []Nasal Congestion   []Sinus Pain  []Hoarseness []Sore throat  []Rhinorrhea  []Swallowing Difficulty  []____ Eyes:   
[]Eye Pain  []Swelling  []Redness  []Foreign Body  []Discharge  []Vision Changes  []____ Cardiovascular:   
[]Chest Pain  []SOB  []PND  []BECK  []Orthopnea  []Claudication  []Edema  
[]Palpitations  []____ Respiratory:   
[]Cough  []Sputum  []Wheezing,  []SOB  []Hemoptysis  []____ Gastrointestinal:   
[]Nausea  []Vomiting  []Diarrhea  []Constipation  []Pain  []Heartburn  []Anorexia []Dysphagia  []Hematochezia  []Melena,  []Jaundice  []____ Genitourinary:   
[]Dysuria  []Urinary Frequency  []Hematuria  []Urinary Incontinence  []Urgency []Flank Pain  []Hesitancy  []____ Musculoskeletal:   
[]Arthralgias  []Myalgias  []Joint Swelling  []Joint Stiffness  []Back Pain  []Neck Pain  []____ Skin:   
[]Skin Lesions  []Pruritis  []Hair Changes  []Skin rashes  []____ Neuro:   
[]Weakness  []Numbness  []Paresthesias  []Loss of Consciousness  []Syncope  
[]Dizziness  []Headache  []Coordination Changes  []Recent Falls  []____ Psych:   
[]Anxiety/Depression  []Insomnia  []Memory Changes  []Violence/Abuse Hx.  []____ Heme/Lymph:   
[]Bruising  []Bleeding  []Lymphadenopathy  []____ Endocrine:   
[]Polyuria  []Polydipsia  []Temperature Intolerance  []____ Previous treatment/evaluation includes Echocardiogram - TTE/HEVER Cardiac risk factors:  
dyslipidemia, male gender, hypertension. Past Medical History:  
Diagnosis Date  Asthma   
 outgrew this per pt  Autoimmune disease ([facility] [address] CLL  Cancer ([facility] [address] 6/20/14 MULTIPLE SCCAs EXCISED, ALSO BCCAs  CLL (chronic lymphocytic leukemia) ([facility] [address] 2 infusions/states he no longer has this  GERD (gastroesophageal reflux disease)  Hypercholesterolemia  Hypertension  Liver disease 6/6/2014 ADMITTED FOR LIVER DYSFUNCTION, NOT DIAGNOSED, NOW RESOLVED  Pulmonary embolism ([facility] [address] 9/2015  Thyroid disease  Unspecified adverse effect of anesthesia \"out of it for 2 weeks\"  Unspecified sleep apnea   
 had sleep study but no follow up Past Surgical History:  
Procedure Laterality Date  HX ABDOMINAL LAPAROSCOPY  4/2016  HX COLONOSCOPY X2, MOST RECNT WAS IN 2013  HX HEENT    
 tonsillectomy  HX HERNIA REPAIR  4/13/16  
 paraesophageal hernia repair Dr Francia Cole  HX ORTHOPAEDIC Left Frozen shoulder manipulation  HX OTHER SURGICAL    
 moh's procedures - face  MS REVISION OF LOWER EYELID Current Facility-Administered Medications Medication Dose Route Frequency  sodium chloride (NS) flush 5-10 mL  5-10 mL IntraVENous PRN  
 aspirin chewable tablet 81 mg  81 mg Oral DAILY  levothyroxine (SYNTHROID) tablet 150 mcg  150 mcg Oral ACB  pantoprazole (PROTONIX) tablet 40 mg  40 mg Oral ACB  sodium chloride (NS) flush 5-40 mL  5-40 mL IntraVENous Q8H  
 sodium chloride (NS) flush 5-40 mL  5-40 mL IntraVENous PRN  
 acetaminophen (TYLENOL) tablet 650 mg  650 mg Oral Q6H PRN Or  
 acetaminophen (TYLENOL) suppository 650 mg  650 mg Rectal Q6H PRN  polyethylene glycol (MIRALAX) packet 17 g  17 g Oral DAILY PRN  promethazine (PHENERGAN) tablet 12.5 mg  12.5 mg Oral Q6H PRN  Or  
 ondansetron (ZOFRAN) injection 4 mg  4 mg IntraVENous Q6H PRN  
  enoxaparin (LOVENOX) injection 40 mg  40 mg SubCUTAneous DAILY  cefepime (MAXIPIME) 2 g in 0.9% sodium chloride (MBP/ADV) 100 mL  2 g IntraVENous Q12H  
 lactated Ringers infusion  75 mL/hr IntraVENous CONTINUOUS  Vancomycin Pharmacy Dosing   Other Rx Dosing/Monitoring  vancomycin (VANCOCIN) 1,000 mg in 0.9% sodium chloride (MBP/ADV) 250 mL  1,000 mg IntraVENous Q16H Allergies Allergen Reactions  Other Food Other (comments) AVOIDS OTHER SPICES IN GENERAL DUE SEVERE REACTIONS TO OREGANO AND MUSTARD, crury, worschester  Mustard Anaphylaxis THROAT CLOSES  
 Oregano Anaphylaxis THROAT CLOSES  Zithromax [Azithromycin] Anaphylaxis  Pcn [Penicillins] Unable to [employer] [place] Unsure of reaction.  Watermelon Swelling Lips swelling Family History Problem Relation Age of Onset  Heart Disease Mother  Heart Failure Mother  Heart Disease Father  Parkinson's Disease Father  Pneumonia Father  Cancer Sister Lung CA??? - SMOKER  
 Heart Disease Sister  Anesth Problems Neg Hx Social History Socioeconomic History  Marital status:  Spouse name: Not on file  Number of children: Not on file  Years of education: Not on file  Highest education level: Not on file Tobacco Use  Smoking status: Never Smoker  Smokeless tobacco: Never Used Substance and Sexual Activity  Alcohol use: Yes Comment: 1 oz per day.  Drug use: No  
 Sexual activity: Yes Objective: 
  Physical Exam 
 
Vitals:  
Vitals:  
 08/09/20 2123 08/09/20 2354 08/10/20 4978 08/10/20 3168 BP: 103/61 102/61 114/71 127/66 Pulse:  62 64 (!) 55 Resp:  18 18 18 Temp:  98.5 °F (36.9 °C) 98 °F (36.7 °C) 98.1 °F (36.7 °C) SpO2:  98% 95% 95% Weight:   151 lb 12.8 oz (68.9 kg) Height:      
 
 
General:    Alert, cooperative, no distress, appears stated age. Neck:   Supple,  no JVD. Back:     Symmetric, normal curvature. Lungs:     clear to auscultation bilaterally. Heart[de-identified]    Regular rate and rhythm, S1, S2 normal, no murmur, click, rub or gallop. Abdomen:     Soft, non-tender. Bowel sounds normal.   
Extremities:   Extremities normal, atraumatic, no cyanosis or edema. Vascular:   Pulses - 2 + radials Skin:   Skin color normal. No rashes or lesions Neurologic:   CN II-XII grossly intact. Telemetry: normal sinus rhythm ECG:  
EKG Results Procedure 720 Value Units Date/Time EKG, 12 LEAD, INITIAL [[id number]] Collected:  08/09/20 0319 Order Status:  Completed Updated:  08/09/20 1247 Ventricular Rate 91 BPM   
  Atrial Rate 91 BPM   
  P-R Interval 158 ms QRS Duration 78 ms Q-T Interval 380 ms QTC Calculation (Bezet) 467 ms Calculated R Axis -55 degrees Calculated T Axis 60 degrees Diagnosis --  
  Sinus rhythm with premature atrial complexes Left axis deviation No previous ECGs available Confirmed by Estelle Varghese M.D., [facility] ([id number]) on 8/9/2020 12:47:49 PM 
  
  
 
 
 
Data Review:  
 
Radiology: XR Results (most recent): 
Results from Hospital Encounter encounter on 08/09/20 XR CHEST PORT Narrative Clinical indication sepsis. Portable AP semiupright view of the chest obtained and compared to May 12, 2019. Chronic basilar changes are stable. Infusion catheter is in place. The heart 
size is normal. 
  
 Impression IMPRESSION: No acute changes. Recent Labs 08/10/20 
0335 08/09/20 
2141 08/09/20 
1522 TROIQ 0.12* 0.13* 0.16* Recent Labs 08/10/20 
9495 08/09/20 
5821 08/09/20 
0320   --  136  
K 3.6  --  3.5   --  102 CO2 26  --  27 BUN 16  --  19  
CREA 0.92  --  0.99 GLU 96  --  138* PHOS  --  2.0*  --   
CA 8.1*  --  9.4 Recent Labs 08/10/20 
4104 08/09/20 
4007 08/09/20 
0320 WBC 8.9  --  16.5* HGB 11.0*  --  13.4 HCT 33.2* 41.0 39.6 PLT 95*  --  119* Recent Labs 08/10/20 
0335 08/09/20 2762 08/09/20 
0320 PTP  --  10.8  --   
INR  --  1.0  --   
AP 40*  --  52 No results for input(s): CHOL, LDLC in the last 72 hours. No lab exists for component: TGL, HDLC,  HBA1C Recent Labs 08/09/20 
9517 TSH 0.30* Jonathan Minerva. VIVIENNE Kennedy MD 
 
 
   [facility], [address] [place] [address] 
   [phone] Valentina Paz MD

## 2020-08-10 NOTE — PROGRESS NOTES
SPEECH PATHOLOGY BEDSIDE SWALLOW EVALUATION/DISCHARGE Patient: Zahra Fermin (80 y.o. male) Date: 8/10/2020 Primary Diagnosis: Acute delirium [R41.0] Precautions:  
 
ASSESSMENT : 
Based on the objective data described below, the patient presents with intact oropharyngeal swallow. Mentation appears improved since admission from chart notes. No skilled SLP services warranted at this time. Skilled acute therapy provided by a speech-language pathologist is not indicated at this time. PLAN : 
Recommendations: 1. Regular diet/thin liquids 2. Safe swallowing strategies (upright for all PO, small bites/sips, slow rate) Discharge Recommendations: None SUBJECTIVE:  
Patient laughed when I introduced myself \"I don't have problems with either of those things. \" OBJECTIVE:  
 
Past Medical History:  
Diagnosis Date  Asthma   
 outgrew this per pt  Autoimmune disease ([facility] [address] CLL  Cancer ([facility] [address] 6/20/14 MULTIPLE SCCAs EXCISED, ALSO BCCAs  CLL (chronic lymphocytic leukemia) ([facility] [address] 2 infusions/states he no longer has this  GERD (gastroesophageal reflux disease)  Hypercholesterolemia  Hypertension  Liver disease 6/6/2014 ADMITTED FOR LIVER DYSFUNCTION, NOT DIAGNOSED, NOW RESOLVED  Pulmonary embolism ([facility] [address] 9/2015  Thyroid disease  Unspecified adverse effect of anesthesia \"out of it for 2 weeks\"  Unspecified sleep apnea   
 had sleep study but no follow up Past Surgical History:  
Procedure Laterality Date  HX ABDOMINAL LAPAROSCOPY  4/2016  HX COLONOSCOPY X2, MOST RECNT WAS IN 2013  HX HEENT    
 tonsillectomy  HX HERNIA REPAIR  4/13/16  
 paraesophageal hernia repair Dr Patrick Parr  HX ORTHOPAEDIC Left Frozen shoulder manipulation  HX OTHER SURGICAL    
 moh's procedures - face  [place] REVISION OF LOWER EYELID Diet prior to admission: Regular/thin Current Diet:  Regular/thin 
Cognitive and Communication Status: Neurologic State: Alert Orientation Level: Oriented X4 Cognition: Appropriate decision making, Appropriate for age attention/concentration Perception: Appears intact Perseveration: No perseveration noted Safety/Judgement: Awareness of environment, Insight into deficits Oral Assessment: 
Oral Assessment Labial: No impairment Dentition: Natural 
P.O. Trials: 
Patient Position: Upright in bed Vocal quality prior to P.O.: No impairment Consistency Presented: Mechanical soft; Thin liquid How Presented: Self-fed/presented Bolus Acceptance: No impairment Bolus Formation/Control: No impairment Propulsion: No impairment Oral Residue: None Aspiration Signs/Symptoms: None Oral Phase Severity: No impairment NOMS:  
The NOMS functional outcome measure was used to quantify this patient's level of swallowing impairment. Based on the NOMS, the patient was determined to be at level 7 for swallow function NOMS Swallowing Levels: 
Level 1 (CN): NPO Level 2 (CM): NPO but takes consistency in therapy Level 3 (CL): Takes less than 50% of nutrition p.o. and continues with nonoral feedings; and/or safe with mod cues; and/or max diet restriction Level 4 (CK): Safe swallow but needs mod cues; and/or mod diet restriction; and/or still requires some nonoral feeding/supplements Level 5 (CJ): Safe swallow with min diet restriction; and/or needs min cues Level 6 (CI): Independent with p.o.; rare cues; usually self cues; may need to avoid some foods or needs extra time Level 7 (CH): Independent for all p.o. LUIS FERNANDO. (2003). National Outcomes Measurement System (NOMS): Adult Speech-Language Pathology User's Guide. Pain: 
Pain Scale 1: Numeric (0 - 10) Pain Intensity 1: 0 After treatment:  
Patient left in no apparent distress in bed, Call bell within reach and Nursing notified COMMUNICATION/EDUCATION:  
 
The patient's plan of care including recommendations, planned interventions, and recommended diet changes were discussed with: Registered nurse. Thank you for this referral. 
Gonzalo Birch. Mendez Cruz MS, CCC-SLP, BCS-S Time Calculation: 20 mins

## 2020-08-10 NOTE — PROGRESS NOTES
Transition Plan of Care 
RUR 13%-Low At baseline he is independent per wife. Will need PT/OT evaluations prior to discharge to assess safety to discharge home vs home health. Martin Barr RN CRM Ext [phone] Medicare pt has received, reviewed, and signed 1st  IM letter informing them of their right to appeal the discharge. Signed copy has been placed on pt bedside chart.

## 2020-08-10 NOTE — PROGRESS NOTES
Problem: Pressure Injury - Risk of 
Goal: Prevention of pressure injury Outcome: Progressing Towards Goal 
Pt demonstrated that he can turn himself in bed without any assistance, HOB 30 degrees or less, Minimize layers Problem: Falls - Risk of 
Goal: Absence of Falls Outcome: Progressing Towards Goal 
Call light in reach, Patient to call for help with toileting needs, Stay With Me (per policy), Toileting schedule/hourly rounds 0018-Elenita from MRI informed that MRI screening attempted, pt states that he has 2 metal implants in his body, doesn't remember where, offered to call his wife to perform screening, pt doesn't want me to wake her up, will do MRI screening in the morning. Hourly rounding done, pt in NSR, on room air, O2 saturation greater than 90%, ambulated to [facility], had a medium bowel movement, using condom cath, urine yellow and clear, no complaints of pain throughout shift. 0800-Bedside shift change report given to [facility] [place] (oncoming nurse) by Nickie Schumacher RN (offgoing nurse). Report included the following information SBAR, Kardex, ED Summary, Intake/Output, MAR, Accordion, Recent Results, Med Rec Status and Cardiac Rhythm NSR.

## 2020-08-10 NOTE — PROGRESS NOTES
Verified with patient that information could be given to his son. Completed MRI form with wife. Patient has IVC filter in place, Wife brought documentation for MRI. Problem: Falls - Risk of 
Goal: *Absence of Falls Description: Document Lester Niño Fall Risk and appropriate interventions in the flowsheet. Outcome: Progressing Towards Goal 
Note: Fall Risk Interventions: 
Mobility Interventions: Patient to call before getting OOB, PT Consult for mobility concerns, OT consult for ADLs Medication Interventions: Evaluate medications/consider consulting pharmacy Elimination Interventions: Patient to call for help with toileting needs, Call light in reach History of Falls Interventions: Investigate reason for fall, Door open when patient unattended TRANSFER - OUT REPORT: 
 
Verbal report given to Indu(name) on [facility]  being transferred to (unit) for routine progression of care Report consisted of patients Situation, Background, Assessment and  
Recommendations(SBAR). Information from the following report(s) SBAR, Kardex, ED Summary and Quality Measures was reviewed with the receiving nurse. Opportunity for questions and clarification was provided. Patient transported with: 
 Registered Nurse Tech

## 2020-08-10 NOTE — PROGRESS NOTES
[facility] [facility] Adult  Hospitalist Group Hospitalist Progress Note Yelitza Gomez MD 
Answering service: [phone] or [phone] from in house phone Date of Service:  8/10/2020 NAME:  Debi Oliver :  3/17/1931 MRN:  [id number] Admission Summary:  
 
Patient was in his usual state of health until the day of presentation at the emergency room when the patient developed weakness. The weakness is progressive and getting worse. As a result of the weakness, the patient slid off his bed twice. No head injury. For the second time, the patient was not able to get up from the floor by himself, because of that, his wife called EMS. It was also reported that the patient developed nausea and vomiting shortly after dinner, but the patient denies abdominal pain. According to report, the patient is more confused than usual and was recently diagnosed with dementia. The patient was brought to the emergency room for further evaluation. When the patient arrived at the emergency room, CT scan of the abdomen and pelvis was obtained. This was negative. CT scan of the head was also negative. The patient presented with fever and leukocytosis. The emergency room physician suspected sepsis of unknown origin and referred the patient to the hospitalist service for evaluation for admission. Interval history / Subjective: He is doing well, denies any acute complaint at this time. Assessment & Plan: Altered mental status 
-CT scan of the head negative,  MRI pending 
-Likely metabolic encephalopathy due to sepsis 
-Has baseline dementia 
  
Sepsis 
-Patient with fever and leukocytosis 
-Unclear source at this time 
-CT abdomen and pelvis shows no acute pathology, normal UA 
-CTA of the chest is pending 
-follow blood cultures 
-Continue empiric antibiotics 
  
Elevated troponin -Likely from demand ischemia from sepsis -Trend troponin, if trending up, consider cardiology consult 
  
Elevated BNP 
-Check echocardiogram 
  Thrombocytopenia 
-Likely from sepsis -Monitor CBC 
  
Hypertension 
-Stable 
  
Hypothyroidism 
-Continue Synthroid 
  
Chronic lymphocytic leukemia 
-This is in remission 
  
Obstructive sleep apnea 
-CPAP at bedtime Code status: FULL 
DVT prophylaxis: Lovenox Care Plan discussed with: Patient/Family Anticipated Disposition: TBD Anticipated Discharge: 24 hours to 48 hours Hospital Problems  Date Reviewed: 8/9/2020 Codes Class Noted POA * (Principal) Acute delirium ICD-10-CM: R41.0 ICD-9-CM: 780.09  8/9/2020 Yes Review of Systems: A comprehensive review of systems was negative except for that written in the HPI. Vital Signs:  
 Last 24hrs VS reviewed since prior progress note. Most recent are: 
Visit Vitals /66 Pulse 67 Temp 98.4 °F (36.9 °C) Resp 16 Ht 5' 10\" (1.778 m) Wt 68.5 kg (151 lb) SpO2 94% BMI 21.67 kg/m² Intake/Output Summary (Last 24 hours) at 8/10/2020 1418 Last data filed at 8/10/2020 0800 Gross per 24 hour Intake 2132.5 ml Output  Net 2132.5 ml Physical Examination:  
 
 
     
Constitutional:  No acute distress, cooperative, pleasant ENT:  Oral mucosa moist, oropharynx benign. Resp:  CTA bilaterally. No wheezing/rhonchi/rales. No accessory muscle use CV:  Regular rhythm, normal rate, no murmurs, gallops, rubs GI:  Soft, non distended, non tender. normoactive bowel sounds, no hepatosplenomegaly Musculoskeletal:  No edema, warm, 2+ pulses throughout Neurologic:  Moves all extremities. AAOx3, CN II-XII reviewed Skin:  Good turgor, no rashes or ulcers Data Review:  
 Review and/or order of clinical lab test 
 
 
Labs:  
 
Recent Labs 08/10/20 
9971 08/09/20 
0596 08/09/20 
0320 WBC 8.9  --  16.5* HGB 11.0*  --  13.4 HCT 33.2* 41.0 39.6 PLT 95*  --  119* Recent Labs 08/10/20 
1659 08/09/20 
7551 08/09/20 
0320   --  136  
K 3.6  --  3.5   --  102 CO2 26  --  27 BUN 16  --  19  
CREA 0.92  --  0.99 GLU 96  --  138* CA 8.1*  --  9.4 MG  --  1.8  --   
PHOS  --  2.0*  --   
 
Recent Labs 08/10/20 
9347 08/09/20 
0230 08/09/20 
0320 ALT 16  --  18  
AP 40*  --  52  
TBILI 0.4  --  0.8 TP 5.2*  --  6.7 ALB 2.5*  --  3.5 GLOB 2.7  --  3.2 LPSE  --  52*  --   
 
Recent Labs 08/09/20 2025 INR 1.0 PTP 10.8 APTT 26.3 Recent Labs 08/09/20 
0487 FERR 91 Lab Results Component Value Date/Time Folate >19.9 12/18/2015 12:23 PM  
  
No results for input(s): PH, PCO2, PO2 in the last 72 hours. Recent Labs 08/10/20 
0335 08/09/20 
2141 08/09/20 
1522 TROIQ 0.12* 0.13* 0.16* Lab Results Component Value Date/Time Cholesterol, total 176 06/10/2019 03:37 PM  
 HDL Cholesterol 59 06/10/2019 03:37 PM  
 LDL, calculated 93 06/10/2019 03:37 PM  
 Triglyceride 122 06/10/2019 03:37 PM  
 
Lab Results Component Value Date/Time Glucose (POC) 105 (H) 04/15/2016 06:33 AM  
 Glucose (POC) 191 (H) 04/14/2016 12:29 AM  
 Glucose (POC) 163 (H) 04/13/2016 06:56 PM  
 Glucose (POC) 124 (H) 04/13/2016 11:11 AM  
 Glucose (POC) 76 04/13/2016 10:52 AM  
 
Lab Results Component Value Date/Time  Color YELLOW/STRAW 08/09/2020 12:38 PM  
 Appearance CLEAR 08/09/2020 12:38 PM  
 Specific gravity 1.025 08/09/2020 12:38 PM  
 pH (UA) 5.0 08/09/2020 12:38 PM  
 Protein Negative 08/09/2020 12:38 PM  
 Glucose Negative 08/09/2020 12:38 PM  
 Ketone Negative 08/09/2020 12:38 PM  
 Bilirubin Negative 08/09/2020 12:38 PM  
 Urobilinogen 0.2 08/09/2020 12:38 PM  
 Nitrites Negative 08/09/2020 12:38 PM  
 Leukocyte Esterase Negative 08/09/2020 12:38 PM  
 Bacteria None seen 06/11/2018 10:00 AM  
 WBC 0-5 06/11/2018 10:00 AM  
 RBC 0-2 06/11/2018 10:00 AM  
 
 
 
 Medications Reviewed:  
 
Current Facility-Administered Medications Medication Dose Route Frequency  sodium chloride 0.9 % bolus infusion 100 mL  100 mL IntraVENous RAD ONCE  
 iopamidoL (ISOVUE-370) 76 % injection 100 mL  100 mL IntraVENous RAD ONCE  
 sodium chloride (NS) flush 10 mL  10 mL IntraVENous RAD ONCE  
 sodium chloride (NS) flush 5-10 mL  5-10 mL IntraVENous PRN  
 aspirin chewable tablet 81 mg  81 mg Oral DAILY  levothyroxine (SYNTHROID) tablet 150 mcg  150 mcg Oral ACB  pantoprazole (PROTONIX) tablet 40 mg  40 mg Oral ACB  sodium chloride (NS) flush 5-40 mL  5-40 mL IntraVENous Q8H  
 sodium chloride (NS) flush 5-40 mL  5-40 mL IntraVENous PRN  
 acetaminophen (TYLENOL) tablet 650 mg  650 mg Oral Q6H PRN Or  
 acetaminophen (TYLENOL) suppository 650 mg  650 mg Rectal Q6H PRN  polyethylene glycol (MIRALAX) packet 17 g  17 g Oral DAILY PRN  promethazine (PHENERGAN) tablet 12.5 mg  12.5 mg Oral Q6H PRN Or  
 ondansetron (ZOFRAN) injection 4 mg  4 mg IntraVENous Q6H PRN  
 enoxaparin (LOVENOX) injection 40 mg  40 mg SubCUTAneous DAILY  cefepime (MAXIPIME) 2 g in 0.9% sodium chloride (MBP/ADV) 100 mL  2 g IntraVENous Q12H  
 lactated Ringers infusion  75 mL/hr IntraVENous CONTINUOUS  Vancomycin Pharmacy Dosing   Other Rx Dosing/Monitoring  vancomycin (VANCOCIN) 1,000 mg in 0.9% sodium chloride (MBP/ADV) 250 mL  1,000 mg IntraVENous Q16H  
 
______________________________________________________________________ EXPECTED LENGTH OF STAY: 4d 19h ACTUAL LENGTH OF STAY:          1 Prabha Guerrero MD

## 2020-08-11 NOTE — PROGRESS NOTES
Occupational Therapy Received consult and order acknowledged, per chart review CT scan showed subsegmental PE in RLL and pt started on heparin drip, doppler study ordered to r/o DVT, will defer OT at this time pending result of study. Sejal Britton OTR/L 
 
12:02 test still pending; will retry later as able, Patient reports his wife just called was just informed today that she has breast cancer.  Sejal Britton OTR/L

## 2020-08-11 NOTE — ACP (ADVANCE CARE PLANNING)
[facility] [facility] Adult  Hospitalist Group Advance Care Planning Note Name: Franklin Miller YOB: 1931 MRN: [id number] Admission Date: 8/9/2020  3:01 AM 
 
Date of discussion: 8/11/2020 Active Diagnoses: 
 
Hospital Problems  Date Reviewed: 8/9/2020 Codes Class Noted POA * (Principal) Acute delirium ICD-10-CM: R41.0 ICD-9-CM: 780.09  8/9/2020 Yes These active diagnoses are of sufficient risk that focused discussion on advance care planning is indicated in order to allow the patient to thoughtfully consider personal goals of care, and if situations arise that prevent the ability to personally give input, to ensure appropriate representation of their personal desires for different levels and aggressiveness of care. Discussion:  
 
Persons present and participating in discussion: Zoila Amezcua MD 
 
Discussion: Discussed about co-morbidities, quality of life, CPR, Intubation. The patient wants to be DNR Time Spent:  
 
Total time spent face-to-face in education and discussion: 17 minutes.   
 
Philly Ma MD 
Date of Service:  8/11/2020 
12:04 PM

## 2020-08-11 NOTE — PROGRESS NOTES
Cardiology Progress Note Admit Date: 8/9/2020 Admit Diagnosis: Acute delirium [R41.0] Date: 8/11/2020     Time: 8:15 AM 
 
Subjective: 
Sitting up in bed. Eating breakfast.  No c/o CP or SOB Assessment and Plan 1. Troponin elevation 
            - minimal and flat - 0.12 -0.16 
            - Asymptomatic 
            - EKG without ACS 
            - suspect related to demand sepsis 2. Elevated proBNP - 4000 
            - CTA of chest with PTE 
08/09/20 ECHO ADULT COMPLETE 08/10/2020 8/10/2020 Narrative · LV: Normal cavity size and systolic function (ejection fraction normal). Mild concentric hypertrophy. Estimated left ventricular ejection fraction  
is 55 - 60%. Visually measured ejection fraction. Wall motion: normal. 
· RV: Not well visualized. · AV: Mild aortic valve regurgitation is present. Signed by: Margaret Benoit MD  
3. Sepsis - per Primary team 
4. AMS 
            - metabolic 2/2 #3 
5. HTN 
            - stable on home meds 6. CLL 
            - in remission 7. LISANDRA - CPAP compliant 
  
Echo normal.  CTA of chest with RLL PTE. Hep gtt started. PTE explains both small trop elevation and elevation of proBNP. Needs no further cardiac testing at this time. PTE per Primary team. 
 
 
 
 
No results found for: PARRISH Past Medical History:  
Diagnosis Date  Asthma   
 outgrew this per pt  Autoimmune disease ([address] CLL  Cancer ([address] 6/20/14 MULTIPLE SCCAs EXCISED, ALSO BCCAs  CLL (chronic lymphocytic leukemia) ([address] 2 infusions/states he no longer has this  GERD (gastroesophageal reflux disease)  Hypercholesterolemia  Hypertension  Liver disease 6/6/2014 ADMITTED FOR LIVER DYSFUNCTION, NOT DIAGNOSED, NOW RESOLVED  Pulmonary embolism ([address] 9/2015  Thyroid disease  Unspecified adverse effect of anesthesia \"out of it for 2 weeks\"  Unspecified sleep apnea   
 had sleep study but no follow up Social History Tobacco Use  Smoking status: Never Smoker  Smokeless tobacco: Never Used Substance Use Topics  Alcohol use: Yes Comment: 1 oz per day.  Drug use: No  
  
  
 
Review of Systems:   
[] Patient unable to provide secondary to condition 
 
[x] All systems negative, except as checked below. Constitutional:   
[]Weight Change  []Fever   []Chills   []Night Sweats  []Fatigue  []Malaise  []____ 
ENT/Mouth:    
[]Hearing Changes  []Ear Pain  []Nasal Congestion   []Sinus Pain  []Hoarseness []Sore throat  []Rhinorrhea  []Swallowing Difficulty  []____ Eyes:   
[]Eye Pain  []Swelling  []Redness  []Foreign Body  []Discharge  []Vision Changes  []____ Cardiovascular:   
[]Chest Pain  []SOB  []PND  []BECK  []Orthopnea  []Claudication  []Edema  
[]Palpitations  []____ Respiratory:   
[]Cough  []Sputum  []Wheezing,  []SOB  []Hemoptysis  []____ Gastrointestinal:   
[]Nausea  []Vomiting  []Diarrhea  []Constipation  []Pain  []Heartburn  []Anorexia []Dysphagia  []Hematochezia  []Melena,  []Jaundice  []____ Genitourinary:   
[]Dysuria  []Urinary Frequency  []Hematuria  []Urinary Incontinence  []Urgency []Flank Pain  []Hesitancy  []____ Musculoskeletal:   
[]Arthralgias  []Myalgias  []Joint Swelling  []Joint Stiffness  []Back Pain  []Neck Pain  []____ Skin:   
[]Skin Lesions  []Pruritis  []Hair Changes  []Skin rashes  []____ Neuro:   
[]Weakness  []Numbness  []Paresthesias  []Loss of Consciousness  []Syncope  
[]Dizziness  []Headache  []Coordination Changes  []Recent Falls  []____ Psych:   
[]Anxiety/Depression  []Insomnia  []Memory Changes  []Violence/Abuse Hx.  []____ Heme/Lymph:   
[]Bruising  []Bleeding  []Lymphadenopathy  []____ Endocrine:   
[]Polyuria  []Polydipsia  []Temperature Intolerance  []____ Objective: 
 
 Physical Exam: Visit Vitals /84 (BP 1 Location: Left arm, BP Patient Position: At rest) Pulse (!) 56 Temp 97.8 °F (36.6 °C) Resp 18 Ht 5' 10\" (1.778 m) Wt 155 lb 6.4 oz (70.5 kg) SpO2 96% BMI 22.30 kg/m² General Appearance:   Well developed, well nourished,alert and oriented x 3, and 
 individual in no acute distress. Ears/Nose/Mouth/Throat:    Hearing grossly normal.  
    Neck:  Supple. Chest:    Lungs clear to auscultation bilaterally. Cardiovascular:   Regular rate and rhythm, S1, S2 normal, no murmur. Abdomen:    Soft, non-tender, bowel sounds are active. Extremities:  No edema bilaterally. Skin:  Warm and dry. Telemetry:   
 
Data Review:  
 Labs:   
Recent Results (from the past 24 hour(s)) ECHO ADULT COMPLETE Collection Time: 08/10/20  2:11 PM  
Result Value Ref Range IVSd 1.16 (A) 0.6 - 1.0 cm LVIDd 4.09 (A) 4.2 - 5.9 cm LVIDs 2.84 cm  
 LVOT d 2.28 cm  
 LVPWd 1.23 (A) 0.6 - 1.0 cm  
 LVOT Peak Gradient 3.79 mmHg LVOT Peak Velocity 97.32 cm/s RVIDd 4.52 cm RVSP 30.08 mmHg Left Atrium Major Axis 3.30 cm LA Volume 51.15 18 - 58 mL  
 LA Area 4C 19.61 cm2 LA Vol 2C 39.69 18 - 58 mL  
 LA Vol 4C 53.02 18 - 58 mL Est. RA Pressure 3.00 mmHg Aortic Valve Area by Continuity of Peak Velocity 3.28 cm2 Aortic Regurgitant Pressure Half-time 0.65 s  
 AR Max Jake 415.74 cm/s AoV PG 5.83 mmHg Aortic Valve Systolic Peak Velocity 281.82 cm/s  
 MV A Jake 82.25 cm/s Mitral Valve E Wave Deceleration Time 0.27 s MV E Jake 85.47 cm/s Mitral Valve Pressure Half-time 0.08 s MVA (PHT) 2.79 cm2 Pulmonic Valve Systolic Peak Instantaneous Gradient 2.76 mmHg Tapse 2.19 (A) 1.5 - 2.0 cm Triscuspid Valve Regurgitation Peak Gradient 27.08 mmHg  
 TR Max Velocity 260.19 cm/s Ao Root D 3.52 cm  
 MV E/A 1.04   
 LV Mass .1 88 - 224 g LV Mass AL Index 91.8 49 - 115 g/m2 Left Atrium Minor Axis 1.78 cm LA Vol Index 27.61 16 - 28 ml/m2 LA Vol Index 21.43 16 - 28 ml/m2 LA Vol Index 28.62 16 - 28 ml/m2 MESSI/BSA Pk Jake 1.8 cm2/m2 CBC WITH AUTOMATED DIFF Collection Time: 08/11/20 12:27 AM  
Result Value Ref Range WBC 6.9 4.1 - 11.1 K/uL  
 RBC 3.79 (L) 4.10 - 5.70 M/uL  
 HGB 11.8 (L) 12.1 - 17.0 g/dL HCT 35.0 (L) 36.6 - 50.3 % MCV 92.3 80.0 - 99.0 FL  
 MCH 31.1 26.0 - 34.0 PG  
 MCHC 33.7 30.0 - 36.5 g/dL  
 RDW 12.9 11.5 - 14.5 % PLATELET 079 (L) 790 - 400 K/uL MPV 9.8 8.9 - 12.9 FL  
 NRBC 0.0 0  WBC ABSOLUTE NRBC 0.00 0.00 - 0.01 K/uL NEUTROPHILS 71 32 - 75 % LYMPHOCYTES 21 12 - 49 % MONOCYTES 6 5 - 13 % EOSINOPHILS 2 0 - 7 % BASOPHILS 0 0 - 1 % IMMATURE GRANULOCYTES 0 0.0 - 0.5 % ABS. NEUTROPHILS 5.0 1.8 - 8.0 K/UL  
 ABS. LYMPHOCYTES 1.4 0.8 - 3.5 K/UL  
 ABS. MONOCYTES 0.4 0.0 - 1.0 K/UL  
 ABS. EOSINOPHILS 0.1 0.0 - 0.4 K/UL  
 ABS. BASOPHILS 0.0 0.0 - 0.1 K/UL  
 ABS. IMM. GRANS. 0.0 0.00 - 0.04 K/UL  
 DF SMEAR SCANNED    
 RBC COMMENTS NORMOCYTIC, NORMOCHROMIC METABOLIC PANEL, BASIC Collection Time: 08/11/20 12:27 AM  
Result Value Ref Range Sodium 137 136 - 145 mmol/L Potassium 3.4 (L) 3.5 - 5.1 mmol/L Chloride 106 97 - 108 mmol/L  
 CO2 24 21 - 32 mmol/L Anion gap 7 5 - 15 mmol/L Glucose 98 65 - 100 mg/dL BUN 15 6 - 20 MG/DL Creatinine 0.88 0.70 - 1.30 MG/DL  
 BUN/Creatinine ratio 17 12 - 20 GFR est AA >60 >60 ml/min/1.73m2 GFR est non-AA >60 >60 ml/min/1.73m2 Calcium 8.5 8.5 - 10.1 MG/DL  
PTT Collection Time: 08/11/20 12:27 AM  
Result Value Ref Range aPTT 31.9 22.1 - 32.0 sec  
 aPTT, therapeutic range     58.0 - 77.0 SECS Radiology:  
 
  
Current Facility-Administered Medications Medication Dose Route Frequency  iopamidoL (ISOVUE-370) 76 % injection  heparin 25,000 units in D5W 250 ml infusion  18-36 Units/kg/hr IntraVENous TITRATE  sodium chloride (NS) flush 5-10 mL  5-10 mL IntraVENous PRN  
 aspirin chewable tablet 81 mg  81 mg Oral DAILY  levothyroxine (SYNTHROID) tablet 150 mcg  150 mcg Oral ACB  pantoprazole (PROTONIX) tablet 40 mg  40 mg Oral ACB  sodium chloride (NS) flush 5-40 mL  5-40 mL IntraVENous Q8H  
 sodium chloride (NS) flush 5-40 mL  5-40 mL IntraVENous PRN  
 acetaminophen (TYLENOL) tablet 650 mg  650 mg Oral Q6H PRN Or  
 acetaminophen (TYLENOL) suppository 650 mg  650 mg Rectal Q6H PRN  polyethylene glycol (MIRALAX) packet 17 g  17 g Oral DAILY PRN  promethazine (PHENERGAN) tablet 12.5 mg  12.5 mg Oral Q6H PRN Or  
 ondansetron (ZOFRAN) injection 4 mg  4 mg IntraVENous Q6H PRN  
 [Held by provider] enoxaparin (LOVENOX) injection 40 mg  40 mg SubCUTAneous DAILY  cefepime (MAXIPIME) 2 g in 0.9% sodium chloride (MBP/ADV) 100 mL  2 g IntraVENous Q12H  
 lactated Ringers infusion  75 mL/hr IntraVENous CONTINUOUS  Vancomycin Pharmacy Dosing   Other Rx Dosing/Monitoring  vancomycin (VANCOCIN) 1,000 mg in 0.9% sodium chloride (MBP/ADV) 250 mL  1,000 mg IntraVENous Q16H [place] President. VIVIENNE Hernandez MD 
 
 [facility] [address] [place] [address], [address] [place] 
 [phone]

## 2020-08-11 NOTE — PROGRESS NOTES
Medicare pt has received, reviewed, and signed 2nd IM letter informing them of their right to appeal the discharge. Signed copy has been placed on pt bedside chart. AMARA Lomeli/SEVEN

## 2020-08-11 NOTE — PROGRESS NOTES
[facility] NP Progress note Name: Debi Oliver YOB: 1931 MRN: [id number] Admission Date: 8/9/2020  3:01 AM 
 
Date of service: 8/10/2020 11:27 PM  
  
                         
Overnight Update: CTA Chest revealing subsegmental PE in the right lower lobe - Will initiate heparin gtt - PLT 95k, no active bleeding, this appears to be around his baseline, though on the low end of it - Monitor for signs of bleeding - Lower extremity doppler ordered for the morning, evaluate for DVT as source Froilan Seals FNP-C, PA-C 
[phone] or [employer]

## 2020-08-11 NOTE — PROGRESS NOTES
[address] [facility]  Hospitalist Group Hospitalist Progress Note Jasmin Blackburn MD 
Answering service: [phone] from in house phone Date of Service:  2020 NAME:  Andreas Jones :  3/17/1931 MRN:  [id number] Admission Summary:  
 
Patient was in his usual state of health until the day of presentation at the emergency room when the patient developed weakness. The weakness is progressive and getting worse. As a result of the weakness, the patient slid off his bed twice. No head injury. For the second time, the patient was not able to get up from the floor by himself, because of that, his wife called EMS. It was also reported that the patient developed nausea and vomiting shortly after dinner, but the patient denies abdominal pain. According to report, the patient is more confused than usual and was recently diagnosed with dementia. The patient was brought to the emergency room for further evaluation. When the patient arrived at the emergency room, CT scan of the abdomen and pelvis was obtained. This was negative. CT scan of the head was also negative. The patient presented with fever and leukocytosis. The emergency room physician suspected sepsis of unknown origin and referred the patient to the hospitalist service for evaluation for admission. Interval history / Subjective: F/u AMS No more fever Assessment & Plan: Altered mental status 
-CT scan of the head  negative 
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 
-Likely metabolic encephalopathy due to sepsis 
-Has baseline dementia 
  
Sepsis 
-Patient with fever and leukocytosis 
-Unclear source at this time 
-CT abdomen and pelvis shows no acute pathology, normal UA 
-CTA of the chest Tiny filling defects within subsegmental pulmonary arteries in the right lower lobe likely represent acute pulmonary emboli. 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 Small bilateral pleural effusions. Old granulomatous disease 
-Blood cultures unremarkable 
-On Vanc/Cefepime, will stop Vanc today Acute PE 
-on heparin drip 
-Dopplers pending 
-Will consult Dr Gopi Mcintosh 
  
Elevated troponin 
-Likely from demand ischemia from sepsis 
-Appreciate Cardiology, cleared 
  
Elevated BNP 
-Echo EF 55-60% with no RWMA 
  
Thrombocytopenia 
-Likely from sepsis -Monitor CBC 
  
Hypertension 
-Stable 
  
Hypothyroidism 
-Continue Synthroid 
  
Chronic lymphocytic leukemia 
-This is in remission 
  
Obstructive sleep apnea 
-CPAP at bedtime PT/OT Code status: The patient wants to be DNR 
DVT prophylaxis: heparin drip Plan: Follow Dr Gopi Mcintosh, discharge tomorrow Care Plan discussed with: Patient/Family Anticipated Disposition: TBD Anticipated Discharge: 24 hours to 48 hours Hospital Problems  Date Reviewed: 8/9/2020 Codes Class Noted POA * (Principal) Acute delirium ICD-10-CM: R41.0 ICD-9-CM: 780.09  8/9/2020 Yes Review of Systems: A comprehensive review of systems was negative except for that written in the HPI. Vital Signs:  
 Last 24hrs VS reviewed since prior progress note. Most recent are: 
Visit Vitals /84 (BP 1 Location: Left arm, BP Patient Position: At rest) Pulse (!) 56 Temp 97.8 °F (36.6 °C) Resp 18 Ht 5' 10\" (1.778 m) Wt 70.5 kg (155 lb 6.4 oz) SpO2 96% BMI 22.30 kg/m² Intake/Output Summary (Last 24 hours) at 8/11/2020 6002 Last data filed at 8/11/2020 3932 Gross per 24 hour Intake 1200 ml Output 2100 ml Net -900 ml Physical Examination:  
 
 
     
Constitutional:  No acute distress, cooperative, pleasant ENT:  Oral mucosa moist, oropharynx benign. Resp: CTA bilaterally. No wheezing/rhonchi/rales. No accessory muscle use CV:  Regular rhythm, normal rate, no murmurs, gallops, rubs GI:  Soft, non distended, non tender. normoactive bowel sounds, no hepatosplenomegaly Musculoskeletal:  No edema, warm, 2+ pulses throughout Neurologic:  Moves all extremities. AAOx3, CN II-XII reviewed Skin:  Good turgor, no rashes or ulcers Data Review:  
 Review and/or order of clinical lab test 
 
 
Labs:  
 
Recent Labs 08/11/20 
5764 08/10/20 
0335 WBC 6.9 8.9 HGB 11.8* 11.0*  
HCT 35.0* 33.2*  
* 95* Recent Labs 08/11/20 
7900 08/10/20 
0335 08/09/20 
5784 08/09/20 
0320  137  --  136  
K 3.4* 3.6  --  3.5  103  --  102 CO2 24 26  --  27 BUN 15 16  --  19  
CREA 0.88 0.92  --  0.99 GLU 98 96  --  138* CA 8.5 8.1*  --  9.4 MG  --   --  1.8  --   
PHOS  --   --  2.0*  --   
 
Recent Labs 08/10/20 
9012 08/09/20 
3927 08/09/20 
0320 ALT 16  --  18  
AP 40*  --  52  
TBILI 0.4  --  0.8 TP 5.2*  --  6.7 ALB 2.5*  --  3.5 GLOB 2.7  --  3.2 LPSE  --  52*  --   
 
Recent Labs 08/11/20 
8160 08/11/20 
2545 08/09/20 
9530 INR  --   --  1.0 PTP  --   --  10.8 APTT 86.2* 31.9 26.3 Recent Labs 08/09/20 
8552 FERR 91 Lab Results Component Value Date/Time Folate >19.9 12/18/2015 12:23 PM  
  
No results for input(s): PH, PCO2, PO2 in the last 72 hours. Recent Labs 08/10/20 
0335 08/09/20 
2141 08/09/20 
1522 TROIQ 0.12* 0.13* 0.16* Lab Results Component Value Date/Time Cholesterol, total 176 06/10/2019 03:37 PM  
 HDL Cholesterol 59 06/10/2019 03:37 PM  
 LDL, calculated 93 06/10/2019 03:37 PM  
 Triglyceride 122 06/10/2019 03:37 PM  
 
Lab Results Component Value Date/Time  Glucose (POC) 105 (H) 04/15/2016 06:33 AM  
 Glucose (POC) 191 (H) 04/14/2016 12:29 AM  
 Glucose (POC) 163 (H) 04/13/2016 06:56 PM  
 Glucose (POC) 124 (H) 04/13/2016 11:11 AM  
 Glucose (POC) 76 04/13/2016 10:52 AM  
 
Lab Results Component Value Date/Time Color YELLOW/STRAW 08/09/2020 12:38 PM  
 Appearance CLEAR 08/09/2020 12:38 PM  
 Specific gravity 1.025 08/09/2020 12:38 PM  
 pH (UA) 5.0 08/09/2020 12:38 PM  
 Protein Negative 08/09/2020 12:38 PM  
 Glucose Negative 08/09/2020 12:38 PM  
 Ketone Negative 08/09/2020 12:38 PM  
 Bilirubin Negative 08/09/2020 12:38 PM  
 Urobilinogen 0.2 08/09/2020 12:38 PM  
 Nitrites Negative 08/09/2020 12:38 PM  
 Leukocyte Esterase Negative 08/09/2020 12:38 PM  
 Bacteria None seen 06/11/2018 10:00 AM  
 WBC 0-5 06/11/2018 10:00 AM  
 RBC 0-2 06/11/2018 10:00 AM  
 
 
 
Medications Reviewed:  
 
Current Facility-Administered Medications Medication Dose Route Frequency  iopamidoL (ISOVUE-370) 76 % injection  heparin 25,000 units in D5W 250 ml infusion  18-36 Units/kg/hr IntraVENous TITRATE  sodium chloride (NS) flush 5-10 mL  5-10 mL IntraVENous PRN  
 aspirin chewable tablet 81 mg  81 mg Oral DAILY  levothyroxine (SYNTHROID) tablet 150 mcg  150 mcg Oral ACB  pantoprazole (PROTONIX) tablet 40 mg  40 mg Oral ACB  sodium chloride (NS) flush 5-40 mL  5-40 mL IntraVENous Q8H  
 sodium chloride (NS) flush 5-40 mL  5-40 mL IntraVENous PRN  
 acetaminophen (TYLENOL) tablet 650 mg  650 mg Oral Q6H PRN Or  
 acetaminophen (TYLENOL) suppository 650 mg  650 mg Rectal Q6H PRN  polyethylene glycol (MIRALAX) packet 17 g  17 g Oral DAILY PRN  promethazine (PHENERGAN) tablet 12.5 mg  12.5 mg Oral Q6H PRN Or  
 ondansetron (ZOFRAN) injection 4 mg  4 mg IntraVENous Q6H PRN  
 [Held by provider] enoxaparin (LOVENOX) injection 40 mg  40 mg SubCUTAneous DAILY  cefepime (MAXIPIME) 2 g in 0.9% sodium chloride (MBP/ADV) 100 mL  2 g IntraVENous Q12H  
 lactated Ringers infusion  75 mL/hr IntraVENous CONTINUOUS  Vancomycin Pharmacy Dosing   Other Rx Dosing/Monitoring  vancomycin (VANCOCIN) 1,000 mg in 0.9% sodium chloride (MBP/ADV) 250 mL  1,000 mg IntraVENous Q16H  
 
______________________________________________________________________ EXPECTED LENGTH OF STAY: 4d 19h ACTUAL LENGTH OF STAY:          2 Snehal Rodriguez MD

## 2020-08-11 NOTE — PROGRESS NOTES
Layne Abdul NP (Hospitalist) notified and aware that Dr. Jaquelin Maddox (Radiologist) called [facility] to inform RN that the CT scan showed 2 tiny pulmonary emboli. Layne Abdul NP stated,\"I'm looking at the CT Scan now. We may need to start the patient on a Heparin drip . \" RN also informed NP that patient's platelet count was 47,235. NP stated,\"We'll look into it. \" RN will continue patient's plan of care.

## 2020-08-11 NOTE — PROGRESS NOTES
Primary Nurse Anthony Branch RN and Phani Jackson RN performed a dual skin assessment on this patient No impairment noted Biju score is 17 Redness to left eye especially. Scattered redness on extremities, mostly to left lower extremity; bruise on left thumb; reddened areas on torso and moles on patient's back.

## 2020-08-11 NOTE — PROGRESS NOTES
Physical Therapy Received consult and order acknowledged, per chart review CT scan showed subsegmental PE in RLL and pt started on heparin drip, doppler study ordered to r/o DVT, will defer PT at this time pending result of study Sona Lynn PT

## 2020-08-12 NOTE — PROGRESS NOTES
[address] [facility] Adult  Hospitalist Group Hospitalist Progress Note Joaquin Cortes MD 
Answering service: [phone] from in house phone Date of Service:  2020 NAME:  Emilia Mendoza :  3/17/1931 MRN:  [id number] Admission Summary:  
 
Patient was in his usual state of health until the day of presentation at the emergency room when the patient developed weakness. The weakness is progressive and getting worse. As a result of the weakness, the patient slid off his bed twice. No head injury. For the second time, the patient was not able to get up from the floor by himself, because of that, his wife called EMS. It was also reported that the patient developed nausea and vomiting shortly after dinner, but the patient denies abdominal pain. According to report, the patient is more confused than usual and was recently diagnosed with dementia. The patient was brought to the emergency room for further evaluation. When the patient arrived at the emergency room, CT scan of the abdomen and pelvis was obtained. This was negative. CT scan of the head was also negative. The patient presented with fever and leukocytosis. The emergency room physician suspected sepsis of unknown origin and referred the patient to the hospitalist service for evaluation for admission. Interval history / Subjective: F/u AMS No more fever Assessment & Plan: Altered mental status 
-sec to acute metabolic encephalopathy 
-CT scan of the head  negative 
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 
-Likely metabolic encephalopathy due to sepsis 
  
Sepsis 
-Patient with fever and leukocytosis 
-Unclear source at this time 
-CT abdomen and pelvis shows no acute pathology, normal UA 
-CTA of the chest Tiny filling defects within subsegmental pulmonary arteries in the right lower lobe likely represent acute pulmonary emboli. 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 Small bilateral pleural effusions. Old granulomatous disease 
-Blood cultures unremarkable 
-On Vanc/Cefepime, Vanc stopped 8/11. Will switch to a short course of augmentin Acute PE 
-on heparin drip. Will switch to Eliquis 
-Dopplers pending 
-Appreciate discussion with Dr David Whaley 
  
Elevated troponin 
-Likely from demand ischemia from sepsis 
-Appreciate discussion with Dr Sultana Delarosa, outpatient follow up with him in 2 weeks for perfusion study 
  
Elevated BNP 
-Echo EF 55-60% with no RWMA 
  
Thrombocytopenia 
-Likely from sepsis -Monitor CBC 
  
Hypertension 
-Stable 
  
Hypothyroidism 
-Continue Synthroid 
  
Chronic lymphocytic leukemia 
-This is in remission 
  
Obstructive sleep apnea 
-CPAP at bedtime PT/OT awaited Code status: The patient wants to be DNR 
DVT prophylaxis: heparin drip Plan:Awaiting PT/OT, will discharge once cleared Care Plan discussed with: Patient/Family Anticipated Disposition: TBD Anticipated Discharge: 24 hours to 48 hours Hospital Problems  Date Reviewed: 8/9/2020 Codes Class Noted POA * (Principal) Acute delirium ICD-10-CM: R41.0 ICD-9-CM: 780.09  8/9/2020 Yes Review of Systems: A comprehensive review of systems was negative except for that written in the HPI. Vital Signs:  
 Last 24hrs VS reviewed since prior progress note. Most recent are: 
Visit Vitals BP (!) 177/98 Pulse 60 Temp 97.7 °F (36.5 °C) Resp 16 Ht 5' 10\" (1.778 m) Wt 70.5 kg (155 lb 6.4 oz) SpO2 97% BMI 22.30 kg/m² Intake/Output Summary (Last 24 hours) at 8/12/2020 2628 Last data filed at 8/12/2020 6596 Gross per 24 hour Intake  Output 1925 ml Net -1925 ml Physical Examination: Constitutional:  No acute distress, cooperative, pleasant ENT:  Oral mucosa moist, oropharynx benign. Resp:  CTA bilaterally. No wheezing/rhonchi/rales. No accessory muscle use CV:  Regular rhythm, normal rate, no murmurs, gallops, rubs GI:  Soft, non distended, non tender. normoactive bowel sounds, no hepatosplenomegaly Musculoskeletal:  No edema, warm, 2+ pulses throughout Neurologic:  Moves all extremities. AAOx3, CN II-XII reviewed Skin:  Good turgor, no rashes or ulcers Data Review:  
 Review and/or order of clinical lab test 
 
 
Labs:  
 
Recent Labs 08/11/20 
0349 08/10/20 
0335 WBC 6.9 8.9 HGB 11.8* 11.0*  
HCT 35.0* 33.2*  
* 95* Recent Labs 08/12/20 
0120 08/11/20 
7909 08/10/20 
0335 08/09/20 
2909  137 137  --   
K 3.4* 3.4* 3.6  --   
 106 103  --   
CO2 25 24 26  --   
BUN 10 15 16  --   
CREA 0.80 0.88 0.92  --   
GLU 95 98 96  --   
CA 8.5 8.5 8.1*  --   
MG  --   --   --  1.8 PHOS  --   --   --  2.0* Recent Labs 08/10/20 
0335 08/09/20 
1848 ALT 16  --   
AP 40*  --   
TBILI 0.4  --   
TP 5.2*  --   
ALB 2.5*  --   
GLOB 2.7  --   
LPSE  --  47* Recent Labs 08/12/20 
0720 08/12/20 
0128 08/11/20 
1743  08/09/20 
5445 INR  --   --   --   --  1.0 PTP  --   --   --   --  10.8 APTT 64.1* 59.8* 55.0*   < > 26.3  
 < > = values in this interval not displayed. Recent Labs 08/09/20 
8868 FERR 91 Lab Results Component Value Date/Time Folate >19.9 12/18/2015 12:23 PM  
  
No results for input(s): PH, PCO2, PO2 in the last 72 hours. Recent Labs 08/10/20 
0335 08/09/20 
2141 08/09/20 
1522 TROIQ 0.12* 0.13* 0.16* Lab Results Component Value Date/Time Cholesterol, total 176 06/10/2019 03:37 PM  
 HDL Cholesterol 59 06/10/2019 03:37 PM  
 LDL, calculated 93 06/10/2019 03:37 PM  
 Triglyceride 122 06/10/2019 03:37 PM  
 
Lab Results Component Value Date/Time Glucose (POC) 105 (H) 04/15/2016 06:33 AM  
 Glucose (POC) 191 (H) 04/14/2016 12:29 AM  
 Glucose (POC) 163 (H) 04/13/2016 06:56 PM  
 Glucose (POC) 124 (H) 04/13/2016 11:11 AM  
 Glucose (POC) 76 04/13/2016 10:52 AM  
 
Lab Results Component Value Date/Time Color YELLOW/STRAW 08/09/2020 12:38 PM  
 Appearance CLEAR 08/09/2020 12:38 PM  
 Specific gravity 1.025 08/09/2020 12:38 PM  
 pH (UA) 5.0 08/09/2020 12:38 PM  
 Protein Negative 08/09/2020 12:38 PM  
 Glucose Negative 08/09/2020 12:38 PM  
 Ketone Negative 08/09/2020 12:38 PM  
 Bilirubin Negative 08/09/2020 12:38 PM  
 Urobilinogen 0.2 08/09/2020 12:38 PM  
 Nitrites Negative 08/09/2020 12:38 PM  
 Leukocyte Esterase Negative 08/09/2020 12:38 PM  
 Bacteria None seen 06/11/2018 10:00 AM  
 WBC 0-5 06/11/2018 10:00 AM  
 RBC 0-2 06/11/2018 10:00 AM  
 
 
 
Medications Reviewed:  
 
Current Facility-Administered Medications Medication Dose Route Frequency  apixaban (ELIQUIS) tablet 5 mg  5 mg Oral BID  hydroCHLOROthiazide (HYDRODIURIL) tablet 25 mg  25 mg Oral DAILY  amLODIPine (NORVASC) tablet 5 mg  5 mg Oral DAILY  cholecalciferol (vitamin D3) tab 2,000 Units  1 Tab Oral DAILY  . PHARMACY TO SUBSTITUTE PER PROTOCOL (Reordered from: cyanocobalamin (VITAMIN B-12) 1,000 mcg sublingual tablet)    Per Protocol  sodium chloride (NS) flush 5-10 mL  5-10 mL IntraVENous PRN  
 aspirin chewable tablet 81 mg  81 mg Oral DAILY  levothyroxine (SYNTHROID) tablet 150 mcg  150 mcg Oral ACB  pantoprazole (PROTONIX) tablet 40 mg  40 mg Oral ACB  sodium chloride (NS) flush 5-40 mL  5-40 mL IntraVENous Q8H  
 sodium chloride (NS) flush 5-40 mL  5-40 mL IntraVENous PRN  
 acetaminophen (TYLENOL) tablet 650 mg  650 mg Oral Q6H PRN Or  
 acetaminophen (TYLENOL) suppository 650 mg  650 mg Rectal Q6H PRN  polyethylene glycol (MIRALAX) packet 17 g  17 g Oral DAILY PRN  promethazine (PHENERGAN) tablet 12.5 mg  12.5 mg Oral Q6H PRN  Or  
  ondansetron (ZOFRAN) injection 4 mg  4 mg IntraVENous Q6H PRN  
 cefepime (MAXIPIME) 2 g in 0.9% sodium chloride (MBP/ADV) 100 mL  2 g IntraVENous Q12H  
 
______________________________________________________________________ EXPECTED LENGTH OF STAY: 4d 19h ACTUAL LENGTH OF STAY:          3 Conan Scheuermann, MD

## 2020-08-12 NOTE — TELEPHONE ENCOUNTER
Returned call to Delfin holland/ THERESA MARTINEZ [facility]. Advised Dr. Ale Estrella will follow patient for Kedar Barton verbalized understanding.

## 2020-08-12 NOTE — PROGRESS NOTES
PHYSICAL THERAPY EVALUATION/DISCHARGE Patient: Bishnu Boogie (80 y.o. male) Date: 8/12/2020 Primary Diagnosis: Acute delirium [R41.0] Precautions:   Fall ASSESSMENT Based on the objective data described below, the patient presents with dementia, decreased safety awareness and insight, decreased generalized strength, balance, and functional mobility below his baseline. Pt reports he ambulates independently at baseline and denies any falls. Pt was admitted through ED with acute delirium and after having tow episodes of sliding off bed onto floor. Pt is alert and oriented and recalls having these falls at home. Pt transferred to EOB without difficulty but had posterior loss of balance upon standing. He used a rolling walker to ambulate in fried with improved balance and when attempting to ambulate without the walker he had a minor path deviation. Pt completed stairs  using both hands on one rail  with CGA. Instructed pt to have his daughter assist him on stairs at home. Recommended purchasing a rolling walker however despite explanation of improved safety with wheels pt reports he will use his standard walker. Pt also stated he would get back to bed without calling for assistance. Reviewed safety and his fall risk and placed pt on a chair alarm. Functional Outcome Measure: The patient scored 20 on the Tinetti outcome measure which is indicative of moderate fall risk. Other factors to consider for discharge: dementia, decreased safety awareness and insight, fall risk Further skilled acute physical therapy is not indicated at this time. PLAN : 
Recommendation for discharge: (in order for the patient to meet his/her long term goals) Physical therapy at least 2 days/week in the home AND ensure assist and/or supervision for safety with mobility, pt reports he lives with his wife, his daughter from out of town is staying with them at this time This discharge recommendation: Has been made in collaboration with the attending provider and/or case management IF patient discharges home will need the following DME: rolling walker was recommended, pt declined stating he will use his standard walker SUBJECTIVE:  
Patient stated I feel a little wobbly.  OBJECTIVE DATA SUMMARY:  
HISTORY:   
Past Medical History:  
Diagnosis Date  Asthma   
 outgrew this per pt  Autoimmune disease ([facility]) CLL  Cancer ([facility]) 6/20/14 MULTIPLE SCCAs EXCISED, ALSO BCCAs  CLL (chronic lymphocytic leukemia) ([facility]) 2 infusions/states he no longer has this  GERD (gastroesophageal reflux disease)  Hypercholesterolemia  Hypertension  Liver disease 6/6/2014 ADMITTED FOR LIVER DYSFUNCTION, NOT DIAGNOSED, NOW RESOLVED  Pulmonary embolism ([facility]) 9/2015  Thyroid disease  Unspecified adverse effect of anesthesia \"out of it for 2 weeks\"  Unspecified sleep apnea   
 had sleep study but no follow up Past Surgical History:  
Procedure Laterality Date  HX ABDOMINAL LAPAROSCOPY  4/2016  HX COLONOSCOPY X2, MOST RECNT WAS IN 2013  HX HEENT    
 tonsillectomy  HX HERNIA REPAIR  4/13/16  
 paraesophageal hernia repair Dr Josefina Lee  HX ORTHOPAEDIC Left Frozen shoulder manipulation  HX OTHER SURGICAL    
 moh's procedures - face  CT REVISION OF LOWER EYELID Prior level of function: Independent, denies falls in the last year Personal factors and/or comorbidities impacting plan of care: dementia, decreased safety awareness and insight, fall risk, split level home Home Situation Home Environment: Private residence # Steps to Enter: 0 One/Two Story Residence: Split level # of Interior Steps: 9 Interior Rails: Right Lift Chair Available: No(states he is having a chair lift installed) Living Alone: No 
Support Systems: Spouse/Significant Other/Partner, Family member(s) Patient Expects to be Discharged to[de-identified] Private residence Current DME Used/Available at Home: [employer]hav[employer] Nettle Tub or Shower Type: Shower EXAMINATION/PRESENTATION/DECISION MAKING:  
Critical Behavior: 
Neurologic State: Alert Orientation Level: Oriented X4 Cognition: Poor safety awareness, Impaired decision making, Follows commands Safety/Judgement: Decreased awareness of need for assistance, Decreased awareness of need for safety, Decreased insight into deficits Hearing: Auditory Auditory Impairment: None Skin:  Toenail on R foot coming off Range Of Motion: 
AROM: Generally decreased, functional 
  
  
  
PROM: Generally decreased, functional 
  
  
  
Strength:   
Strength: Generally decreased, functional 
  
  
  
  
  
  
Tone & Sensation:  
Tone: Normal 
  
  
  
  
  
  
  
  
   
Coordination: 
Coordination: Generally decreased, functional 
Vision:  
Acuity: Within Defined Limits Functional Mobility: 
Bed Mobility: 
Supine to Sit: Supervision Scooting: Supervision Transfers: 
Sit to Stand: Minimum assistance;Assist x1, posterior loss of balance upon standing Stand to Sit: Assist x1;Contact guard assistance Balance:  
Sitting: Intact Standing: Impaired Standing - Static: Constant support; Fair 
Standing - Dynamic : Constant support; Roena Bile Ambulation/Gait Training: 
Distance (ft): 200 Feet (ft) Assistive Device: Gait belt;Walker, rolling Ambulation - Level of Assistance: Contact guard assistance;Assist x1 Gait Abnormalities: Path deviations;Decreased step clearance, ambulated short distance without walker and had minor path deviation, recommended using a rolling walker at home Speed/Alisha: Slow Step Length: Right shortened;Left shortened Stairs: 
Number of Stairs Trained: 4 Stairs - Level of Assistance: Contact guard assistance;Assist X1 Rail Use: Right (pt held onto rail with both hands when descending stairs) Functional Measure: 
Tinetti test: 
 
Sitting Balance: 1 Arises: 1 Attempts to Rise: 1 Immediate Standing Balance: 1 Standing Balance: 1 Nudged: 1 Eyes Closed: 0 Turn 360 Degrees - Continuous/Discontinuous: 1 Turn 360 Degrees - Steady/Unsteady: 1 Sitting Down: 2 Balance Score: 10 Balance total score Indication of Gait: 1 
R Step Length/Height: 1 L Step Length/Height: 1 
R Foot Clearance: 1 L Foot Clearance: 1 Step Symmetry: 1 Step Continuity: 1 Path: 1 Trunk: 1 Walking Time: 1 Gait Score: 10 Gait total score Total Score: 20/28 Overall total score Tinetti Tool Score Risk of Falls 
<19 = High Fall Risk 19-24 = Moderate Fall Risk 25-28 = Low Fall Risk Tinetti ME. Performance-Oriented Assessment of Mobility Problems in Elderly Patients. Page 66; [id number]. (Scoring Description: PT Bulletin Feb. 10, 1993) Older adults: Castillo Lambert et al, 2009; n = [address] elderly evaluated with ABC, JORGE, ADL, and IADL) · Mean JORGE score for males aged 69-68 years = 26.21(3.40) · Mean JORGE score for females age 69-68 years = 25.16(4.30) · Mean JORGE score for males over 80 years = 23.29(6.02) · Mean JORGE score for females over 80 years = 17.20(8.32) Physical Therapy Evaluation Charge Determination History Examination Presentation Decision-Making MEDIUM  Complexity : 1-2 comorbidities / personal factors will impact the outcome/ POC  MEDIUM Complexity : 3 Standardized tests and measures addressing body structure, function, activity limitation and / or participation in recreation  LOW Complexity : Stable, uncomplicated  MEDIUM Complexity : FOTO score of 26-74 Based on the above components, the patient evaluation is determined to be of the following complexity level: LOW Pain Rating: 
Reports painful toe on R foot, toe nail falling off Activity Tolerance:  
Good Please refer to the flowsheet for vital signs taken during this treatment. After treatment patient left in no apparent distress: Sitting in chair, Call bell within reach and Bed / chair alarm activated COMMUNICATION/EDUCATION:  
The patients plan of care was discussed with: Registered nurse. Fall prevention education was provided and the patient/caregiver indicated understanding. and Patient/family agree to work toward stated goals and plan of care. Thank you for this referral. 
Frank Celis Time Calculation: 38 mins

## 2020-08-12 NOTE — PROGRESS NOTES
2 [facility] called to say that pt's heart rate was in the high 30's. I went in and talked to Pt and he was fine and alert, oriented and in no distress. Called [facility] and they said his heart rate was now in the 60's.

## 2020-08-12 NOTE — DISCHARGE SUMMARY
Discharge Summary PATIENT ID: Mike Anglin MRN: [id number] YOB: 1931 DATE OF ADMISSION: 8/9/2020  3:01 AM   
DATE OF DISCHARGE: 8/12/2020 PRIMARY CARE PROVIDER: Randee Aguirre MD  
 
ATTENDING PHYSICIAN: Dr Joaquin Cortes DISCHARGING PROVIDER: Joaquin Cortes MD   
To contact this individual call [phone] and ask the  to page. If unavailable ask to be transferred the Adult Hospitalist Department. CONSULTATIONS: IP CONSULT TO CARDIOLOGY 
IP CONSULT TO HEMATOLOGY PROCEDURES/SURGERIES: * No surgery found * ADMITTING DIAGNOSES & HOSPITAL COURSE:  
 Altered mental status 
-sec to acute metabolic encephalopathy 
-CT scan of the head 8/9 negative 
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 
-Likely metabolic encephalopathy due to sepsis, now resolved 
  
Sepsis 
-Patient with fever and leukocytosis 
-Unclear source at this time 
-CT abdomen and pelvis shows no acute pathology, normal UA 
-CTA of the chest Tiny filling defects within subsegmental pulmonary arteries in the right lower lobe likely represent acute pulmonary emboli. 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 Small bilateral pleural effusions. Old granulomatous disease 
-Blood cultures unremarkable 
-On Vanc/Cefepime, Vanc stopped 8/11. Will switch to a short course of ceftin 
  
Acute PE 
-on heparin drip. Will switch to Eliquis 
-Dopplers pending 
-Appreciate discussion with Dr Sarkis Herrera 
  
Elevated troponin 
-Likely from demand ischemia from sepsis 
-Appreciate discussion with Dr Piedra, outpatient follow up with him in 2 weeks for perfusion study 
  
Elevated BNP 
-Echo EF 55-60% with no RWMA 
  
Thrombocytopenia 
-Likely from sepsis -Monitor CBC 
  
Hypertension 
-Stable 
  
Hypothyroidism 
-Continue Synthroid 
  
Chronic lymphocytic leukemia 
-This is in remission 
  
Obstructive sleep apnea 
-CPAP at bedtime 
  
 PT/OT awaited 
 
DNR. I spoke to the patient's son and told him about the patient's decision DISCHARGE DIAGNOSES / PLAN:   
 
1. Acute PE  
 
ADDITIONAL CARE RECOMMENDATIONS:  
Follow up with PMD 
Follow up with Cardiology PENDING TEST RESULTS:  
At the time of discharge the following test results are still pending: none FOLLOW UP APPOINTMENTS:   
Follow-up Information Follow up With Specialties Details Why Contact Info Randee Aguirre MD Internal Medicine On 8/19/2020 Hospital follow up PCP appointment Wednesday, 8/19/20 @ 11:15 a.m.  [address] [place] [address] 
[phone] Iván Ashton MD Cardiology In 2 weeks  [address] 
[phone] DIET: Cardiac Diet ACTIVITY: Activity as tolerated DISCHARGE MEDICATIONS: 
Current Discharge Medication List  
  
START taking these medications Details  
apixaban (ELIQUIS) 5 mg tablet Take 2 Tabs by mouth two (2) times a day. For 7 days and then 1 tab PO BID Qty: 70 Tab, Refills: 2  
  
cefUROXime (CEFTIN) 500 mg tablet Take 1 Tab by mouth two (2) times a day. Qty: 10 Tab, Refills: 0 CONTINUE these medications which have NOT CHANGED Details  
hydroCHLOROthiazide (HYDRODIURIL) 25 mg tablet Take 1 tablet by mouth once daily 
Qty: 90 Tab, Refills: 0 Associated Diagnoses: Essential hypertension with goal blood pressure less than 140/90  
  
levothyroxine (SYNTHROID) 150 mcg tablet Take 1 Tab by mouth Daily (before breakfast). Qty: 90 Tab, Refills: 1 Associated Diagnoses: Hypothyroidism due to acquired atrophy of thyroid  
  
amLODIPine (NORVASC) 10 mg tablet Take 5 mg by mouth daily. . Patient taking 1/2 pill daily 
Qty: 90 Tab, Refills: 0 Associated Diagnoses: Essential hypertension with goal blood pressure less than 140/90  
  
niacinamide 500 mg tablet Take 1 Tab by mouth daily. aspirin 81 mg chewable tablet Take 81 mg by mouth daily. omeprazole (PRILOSEC) 40 mg capsule Take 40 mg by mouth daily. Lactobacillus acidophilus (PROBIOTIC PO) Take 1 Cap by mouth daily. cyanocobalamin (VITAMIN B-12) 1,000 mcg sublingual tablet Take 1,000 mcg by mouth daily. co-enzyme Q-10 (CO Q-10) 100 mg capsule Take 100 mg by mouth daily. MEN'S MULTI-VITAMIN PO Take 2 Each by mouth daily. Chews 2 gummies po once daily. Associated Diagnoses: CLL (chronic lymphocytic leukemia) ([facility] [address] Thyroid disease; Altered mental state; Nausea alone  
  
cholecalciferol, vitamin D3, (VITAMIN D3) 2,000 unit tab Take 1 tablet by mouth daily. Associated Diagnoses: CLL (chronic lymphocytic leukemia) ([facility] [address] Thyroid disease; Altered mental state; Nausea alone OTHER Combination of Emu oil and pure Hemp Oil extract. 4 drops per day. Theramu. NOTIFY YOUR PHYSICIAN FOR ANY OF THE FOLLOWING:  
Fever over 101 degrees for 24 hours. Chest pain, shortness of breath, fever, chills, nausea, vomiting, diarrhea, change in mentation, falling, weakness, bleeding. Severe pain or pain not relieved by medications. Or, any other signs or symptoms that you may have questions about. DISPOSITION: 
 x Home With: 
 OT  PT  HH  RN  
  
 Long term SNF/Inpatient Rehab Independent/assisted living Hospice Other:  
 
 
PATIENT CONDITION AT DISCHARGE:  
 
Functional status Poor Deconditioned   
x Independent Cognition  
 x Lucid Forgetful Dementia Catheters/lines (plus indication) Busby PICC   
 PEG   
x None Code status  
 x Full code DNR   
 
PHYSICAL EXAMINATION AT DISCHARGE: 
Please see progress note CHRONIC MEDICAL DIAGNOSES: 
Problem List as of 8/12/2020 Date Reviewed: 8/9/2020 Codes Class Noted - Resolved * (Principal) Acute delirium ICD-10-CM: R41.0 ICD-9-CM: 780.09  8/9/2020 - Present Zenker's diverticulum ICD-10-CM: K22.5 ICD-9-CM: 530.6  6/13/2018 - Present Advanced care planning/counseling discussion ICD-10-CM: Z71.89 ICD-9-CM: V65.49  12/21/2016 - Present S/P IVC filter ICD-10-CM: T54.457 ICD-9-CM: V45.89  4/22/2016 - Present GI bleed ICD-10-CM: K92.2 ICD-9-CM: 578.9  4/11/2016 - Present Pulmonary embolism [facility]) ICD-10-CM: I26.99 
ICD-9-CM: 415.19  9/1/2015 - Present Hypothyroidism ICD-10-CM: E03.9 ICD-9-CM: 244.9  10/3/2014 - Present CLL (chronic lymphocytic leukemia) ([facility]) ICD-10-CM: C91.10 ICD-9-CM: 204.10  Unknown - Present Hypercholesterolemia ICD-10-CM: E78.00 ICD-9-CM: 272.0  Unknown - Present Hypertension ICD-10-CM: I10 
ICD-9-CM: 401.9  Unknown - Present RESOLVED: Advance care planning ICD-10-CM: Z71.89 ICD-9-CM: V65.49  12/18/2015 - 12/21/2016 Overview Signed 12/18/2015 11:12 AM by Lisa Hunter LPN End of life planning discussed with patient. Patient states that they do have an advance directive and will provide a copy for our office at next visit. RESOLVED: Thyroid disease ICD-10-CM: E07.9 ICD-9-CM: 246. 9  Unknown - 12/21/2016 RESOLVED: Elevated cholesterol ICD-10-CM: E78.00 ICD-9-CM: 272.0  3/30/2011 - 3/26/2014 RESOLVED: HBP (high blood pressure) ICD-10-CM: I10 
ICD-9-CM: 401.9  3/30/2011 - 3/26/2014 Greater than 36 minutes were spent with the patient on counseling and coordination of care Signed:  
Pam Oliver MD 
8/12/2020 
9:03 AM

## 2020-08-12 NOTE — PROGRESS NOTES
orientee was appropriately supervised during Assessment and medication administration by preceptor.  
 
Mary Torres RN

## 2020-08-12 NOTE — TELEPHONE ENCOUNTER
Returned call to [place]. She states can disregard previous message they were able to get it handled at the drug store and patient picked up the Eliquis.

## 2020-08-12 NOTE — TELEPHONE ENCOUNTER
Delfin Graft [phone] with [facility]     Please call with a verbal ok for home health

## 2020-08-12 NOTE — PROGRESS NOTES
Bedside and Verbal shift change report given to Izzy Colin (oncoming nurse) by Roxanne Fisher (offgoing nurse). Report included the following information SBAR, Kardex, MAR and Recent Results.

## 2020-08-12 NOTE — PROGRESS NOTES
Hospital follow-up PCP transitional care appointment has been scheduled with Dr. Pauline Hager for Wednesday, 8/19/20 at 11:15 a.m. Pending patient discharge.   Bowen Trejo, Care Management Specialist.

## 2020-08-12 NOTE — TELEPHONE ENCOUNTER
Tawanda Brenden his daughter [phone]    The patient needs a pre Auth for the Latasha today.  Please call

## 2020-08-12 NOTE — TELEPHONE ENCOUNTER
Jayme Earl () [phone] (H)     pt's wife calling about getting a handicap sticker for her ,  King Menon was sent yesterday from her daughter regarding sticker for her mother, but that they both need sticker. They have a form and wonder if dr Tal Luis could fill it out for him?

## 2020-08-12 NOTE — DISCHARGE INSTRUCTIONS
Discharge Instructions       PATIENT ID: Butch Recinos  MRN: [id number]   YOB: 1931    DATE OF ADMISSION: 8/9/2020  3:01 AM    DATE OF DISCHARGE: 8/12/2020    PRIMARY CARE PROVIDER: Elodia Mccarthy MD     ATTENDING PHYSICIAN: Barry Kinney MD  DISCHARGING PROVIDER: Ben Jackman MD    To contact this individual call [phone] and ask the  to page. If unavailable ask to be transferred the Adult Hospitalist Department. DISCHARGE DIAGNOSES   Acute Pulmonary embolism    CONSULTATIONS: IP CONSULT TO CARDIOLOGY  IP CONSULT TO HEMATOLOGY    PROCEDURES/SURGERIES: * No surgery found *    PENDING TEST RESULTS:   At the time of discharge the following test results are still pending: none    FOLLOW UP APPOINTMENTS:   Follow-up Information     Follow up With Specialties Details Why Contact Info    Elodia Mccarthy MD Internal Medicine On 8/19/2020 Hospital follow up PCP appointment Wednesday, 8/19/20 @ 11:15 a.m.  [address] [facility]  [address]      Tee Herrmann MD Cardiology In 2 weeks  [address]   [address]  [place] [address]  [phone]             ADDITIONAL CARE RECOMMENDATIONS:   Follow up with PMD  Follow up with Cardiology    DIET: Cardiac Diet    ACTIVITY: Activity as tolerated      DISCHARGE MEDICATIONS:   See Medication Reconciliation Form    · It is important that you take the medication exactly as they are prescribed. · Keep your medication in the bottles provided by the pharmacist and keep a list of the medication names, dosages, and times to be taken in your wallet. · Do not take other medications without consulting your doctor. NOTIFY YOUR PHYSICIAN FOR ANY OF THE FOLLOWING:   Fever over 101 degrees for 24 hours. Chest pain, shortness of breath, fever, chills, nausea, vomiting, diarrhea, change in mentation, falling, weakness, bleeding. Severe pain or pain not relieved by medications.   Or, any other signs or symptoms that you may have questions about.       DISPOSITION:  x  Home With:   OT  PT  HH  RN       SNF/Inpatient Rehab/LTAC    Independent/assisted living    Hospice    Other:     CDMP Checked:   Yes x     PROBLEM LIST Updated:  Yes x       Signed:   Caprice Britton MD  8/12/2020  9:01 AM

## 2020-08-12 NOTE — PROGRESS NOTES
Problem: Pressure Injury - Risk of 
Goal: *Prevention of pressure injury Description: Document Biju Scale and appropriate interventions in the flowsheet. Outcome: Resolved/Met Note: Pressure Injury Interventions: 
Sensory Interventions: Discuss PT/OT consult with provider Moisture Interventions: Absorbent underpads Activity Interventions: Increase time out of bed Mobility Interventions: PT/OT evaluation Nutrition Interventions: Document food/fluid/supplement intake Friction and Shear Interventions: Minimize layers Problem: Patient Education: Go to Patient Education Activity Goal: Patient/Family Education Outcome: Resolved/Met Problem: Falls - Risk of 
Goal: *Absence of Falls Description: Document Anthony Quinn Fall Risk and appropriate interventions in the flowsheet. Outcome: Resolved/Met Note: Fall Risk Interventions: 
Mobility Interventions: Communicate number of staff needed for ambulation/transfer Mentation Interventions: Adequate sleep, hydration, pain control Medication Interventions: Patient to call before getting OOB Elimination Interventions: Call light in reach, Patient to call for help with toileting needs History of Falls Interventions: Door open when patient unattended Problem: Patient Education: Go to Patient Education Activity Goal: Patient/Family Education Outcome: Resolved/Met Problem: Patient Education: Go to Patient Education Activity Goal: Patient/Family Education Outcome: Resolved/Met

## 2020-08-12 NOTE — TELEPHONE ENCOUNTER
Handicap form completed by Dr. Reynaldo Miller. Advised patient wife Lisandra Yanez ready and at  for , pt wife verbalized understanding.

## 2020-08-12 NOTE — PROGRESS NOTES
Problem: Self Care Deficits Care Plan (Adult) Goal: *Acute Goals and Plan of Care (Insert Text) Description:  
FUNCTIONAL STATUS PRIOR TO ADMISSION: I PTA self care and functional mobility; rides stationary bike 20 min daily; recent dementia dx HOME SUPPORT: lives with wife who did not assist PTA; daughter has come from [place] for PRN assist at discharge Occupational Therapy Goals Initiated 8/12/2020 1. Patient will perform grooming in standing with modified independence within 7 day(s). 2.  Patient will perform bathing with modified independence within 7 day(s). 3.  Patient will perform lower body dressing with modified independence within 7 day(s). 4.  Patient will perform toilet transfers with modified independence within 7 day(s). 5.  Patient will perform all aspects of toileting with modified independence within 7 day(s). 6.  Patient will participate in upper extremity therapeutic exercise/activities with modified independence for 5 minutes within 7 day(s). 7.  Patient will utilize energy conservation and fall prevention techniques during functional activities with verbal, visual, and tactile cues within 7 day(s). Outcome: Progressing Towards Goal 
 OCCUPATIONAL THERAPY EVALUATION Patient: Nae Gupta (80 y.o. male) Date: 8/12/2020 Primary Diagnosis: Acute delirium [R41.0] Precautions:   Fall ASSESSMENT Based on the objective data described below, the patient presents with general debility and mild dementia with decreased dynamic unsupported standing balance during ADLs and functional mobility and decreased safety awareness after admit to ER with confusion and \"sliding out of bed x 2 and unable to get off the floor. \" Current Level of Function Impacting Discharge (ADLs/self-care): mod I- S UE ADLs, CGA LE ADLs and functional mobility with marked decrease in safety awareness, eg safety risks of \"long hot showers\" provided to which patient reported, \"I take really long hot showers and I'm going to take one today. \" (recommended shower chair and short warm shower) Functional Outcome Measure: The patient scored Total: 60/100 on the Barthel Index outcome measure which is indicative of 40% impaired ability to care for basic self needs/dependency on others; inferred 80% dependency on others for instrumental ADLs. Other factors to consider for discharge: decreased safety awareness and increased fall risk Patient will benefit from skilled therapy intervention to address the above noted impairments. PLAN : 
Recommendations and Planned Interventions: self care training, functional mobility training, therapeutic exercise, balance training, therapeutic activities, cognitive retraining, endurance activities, patient education, home safety training, and family training/education Frequency/Duration: Patient will be followed by occupational therapy 5 times a week to address goals. Recommendation for discharge: (in order for the patient to meet his/her long term goals) Occupational therapy at least 2 days/week in the home AND ensure assist and/or supervision for safety with ADLs and functional mobility This discharge recommendation: 
Has been made in collaboration with the attending provider and/or case management IF patient discharges home will need the following DME: shower chair, walker: rolling, and patient declines RW as he owns SW, (despite being trained in safety risks) and reports his family owns shower chair SUBJECTIVE:  
Patient stated I dont want a RW, I'll take the risk.  OBJECTIVE DATA SUMMARY:  
HISTORY:  
Past Medical History:  
Diagnosis Date Asthma   
 outgrew this per pt Autoimmune disease ([address] CLL Cancer ([address] 6/20/14 MULTIPLE SCCAs EXCISED, ALSO BCCAs  
 CLL (chronic lymphocytic leukemia) (HCC) 2 infusions/states he no longer has this GERD (gastroesophageal reflux disease) Hypercholesterolemia Hypertension Liver disease 6/6/2014 ADMITTED FOR LIVER DYSFUNCTION, NOT DIAGNOSED, NOW RESOLVED Pulmonary embolism ([facility] 9/2015 Thyroid disease Unspecified adverse effect of anesthesia \"out of it for 2 weeks\" Unspecified sleep apnea   
 had sleep study but no follow up Past Surgical History:  
Procedure Laterality Date HX ABDOMINAL LAPAROSCOPY  4/2016 HX COLONOSCOPY X2, MOST RECNT WAS IN 2013 HX HEENT    
 tonsillectomy HX HERNIA REPAIR  4/13/16  
 paraesophageal hernia repair Dr Miller Mas HX ORTHOPAEDIC Left Frozen shoulder manipulation HX OTHER SURGICAL    
 moh's procedures - face NE REVISION OF LOWER EYELID Expanded or extensive additional review of patient history:  
 
Home Situation Home Environment: Private residence # Steps to Enter: 0 One/Two Story Residence: Split level # of Interior Steps: 9 Interior Rails: Right Lift Chair Available: No(states he is having a chair lift installed) Living Alone: No 
Support Systems: Spouse/Significant Other/Partner, Family member(s) Patient Expects to be Discharged to[de-identified] Private residence Current DME Used/Available at Home: Rayetta Crest Tub or Shower Type: Shower Hand dominance: Right EXAMINATION OF PERFORMANCE DEFICITS: 
Cognitive/Behavioral Status: 
Neurologic State: Alert; Appropriate for age Orientation Level: Oriented X4 Cognition: Appropriate for age attention/concentration; Follows commands(decreased desire to follow PT recommendations) Perception: Appears intact Perseveration: No perseveration noted Safety/Judgement: Decreased insight into deficits; Decreased awareness of need for safety;Decreased awareness of need for assistance; Fall prevention Skin: L 2nd toenail torn and painful Edema: no DVT Hearing: Auditory Auditory Impairment: None Vision/Perceptual: Acuity: Within Defined Limits Range of Motion: 
B UE 
AROM: Generally decreased, functional 
PROM: Generally decreased, functional 
  
  
  
  
  
  
 
Strength: 
B UE 
Strength: Generally decreased, functional 
Using 2 hands on steps/rails, typically can use 1 rail Coordination: 
Coordination: Generally decreased, functional 
Fine Motor Skills-Upper: Left Intact; Right Intact Gross Motor Skills-Upper: Left Intact; Right Intact(declines with fatigue) Tone & Sensation: 
 
Tone: Normal 
  
  
  
  
  
  
  
 
Balance: 
Sitting: Intact Standing: Impaired; Without support Standing - Static: Fair;Constant support Standing - Dynamic : Fair;Constant support(does not recognize deficts) Functional Mobility and Transfers for ADLs: 
Bed Mobility: 
Rolling: Modified independent Supine to Sit: Modified independent Sit to Supine: Modified independent Scooting: Modified independent Transfers: 
Sit to Stand: Contact guard assistance Stand to Sit: Contact guard assistance Bed to Chair: Contact guard assistance; Adaptive equipment Toilet Transfer : Contact guard assistance; Additional time; Adaptive equipment(inferred) ADL Assessment: 
Feeding: Independent Oral Facial Hygiene/Grooming: Contact guard assistance(in standing) Bathing: Contact guard assistance; Additional time(inferred; bathing safety training initiated) Upper Body Dressing: Setup Lower Body Dressing: Contact guard assistance;Supervision; Additional time(L second toenail torn and painful) Toileting: Contact guard assistance; Adaptive equipment ADL Intervention and task modifications: 
  
Bathroom safety, fall prevention, energy conservation training initiated; decreased carryover of new information and decreased safety awareness Cognitive Retraining Safety/Judgement: Decreased insight into deficits; Decreased awareness of need for safety;Decreased awareness of need for assistance; Fall prevention Functional Measure: 
Barthel Index: 
 
Bathin Bladder: 0 Bowels: 10 
Groomin Dressin Feeding: 10 Mobility: 10 Stairs: 5 Toilet Use: 5 Transfer (Bed to Chair and Back): 10 Total: 60/100 The Barthel ADL Index: Guidelines 1. The index should be used as a record of what a patient does, not as a record of what a patient could do. 2. The main aim is to establish degree of independence from any help, physical or verbal, however minor and for whatever reason. 3. The need for supervision renders the patient not independent. 4. A patient's performance should be established using the best available evidence. Asking the patient, friends/relatives and nurses are the usual sources, but direct observation and common sense are also important. However direct testing is not needed. 5. Usually the patient's performance over the preceding 24-48 hours is important, but occasionally longer periods will be relevant. 6. Middle categories imply that the patient supplies over 50 per cent of the effort. 7. Use of aids to be independent is allowed. [place]., Barthel, D.W. ([id number]). Functional evaluation: the Barthel Index. [facility] (14)2. ALEXEY Pablo, Jose Rosenbaum., Denise Khoury., [place], [address] [place] [address] (). Measuring the change indisability after inpatient rehabilitation; comparison of the responsiveness of the Barthel Index and Functional [place] Measure. Journal of Neurology, Neurosurgery, and Psychiatry, 66(4), 640-352. Di Cooper, N.[place].A, ANDREA Parekh, & Ranae Lanes, M.A. (2004.) Assessment of post-stroke quality of life in cost-effectiveness studies: The usefulness of the Barthel Index and the EuroQoL-5D. [facility], 13, 668-17 Occupational Therapy Evaluation Charge Determination History Examination Decision-Making LOW Complexity : Brief history review  MEDIUM Complexity : 3-5 performance deficits relating to physical, cognitive , or psychosocial skils that result in activity limitations and / or participation restrictions MEDIUM Complexity : 3-5 performance deficits relating to physical, cognitive , or psychosocial skils that result in activity limitations and / or participation restrictions Based on the above components, the patient evaluation is determined to be of the following complexity level: LOW Pain Rating: 
Reports painful torn toenail Activity Tolerance:  
Fair and requires rest breaks Please refer to the flowsheet for vital signs taken during this treatment. After treatment patient left in no apparent distress:   
Sitting in chair, Call bell within reach, and Bed / chair alarm activated COMMUNICATION/EDUCATION:  
The patients plan of care was discussed with: Physical therapist and Registered nurse. Home safety education was provided and the patient/caregiver indicated understanding., Patient/family have participated as able in goal setting and plan of care. , and Patient/family agree to work toward stated goals and plan of care. This patients plan of care is appropriate for delegation to [facility] [place]. Thank you for this referral. 
Lonn Mail OTR/L Time Calculation: 35 mins

## 2020-08-12 NOTE — PROGRESS NOTES
Physical Therapy Orders received, chart reviewed and patient evaluated by physical therapy. Pending progression with skilled acute physical therapy, recommend: 
Physical therapy at least 2 days/week in the home AND ensure assist and/or supervision for safety with mobility and ADLs Recommend with nursing patient to complete as able in order to maintain strength, endurance and independence: OOB to chair 3x/day with supervision and ambulating with rolling walker. Thank you for your assistance. Full evaluation to follow.   
Sona Patel PT

## 2020-08-12 NOTE — PROGRESS NOTES
BRITTANY 
 
RUR 16 % Pt to discharge home today CM met with pt in room and talked to his wife, Annie Jones, [phone], over the phone, to offer [address] for home bal for PT/OT. They agreed on preference for [facility]. CM sent referral to [facility] per CC. [facility] accepted and will start services on Saturday, 8/15/2020. Pt informed and information put on AVS. Phillip Draper RN ACM CRM

## 2020-08-13 NOTE — TELEPHONE ENCOUNTER
Spoke with patient daughter Fe Alvarado, patient and patients wife on speaker phone. Advised to use [employer]. Patient reports one episode of urine incontinence this AM he reports he could not get to the bathroom soon enough. He denies any urinary s/sx. He also had one episode of vomiting this AM.  He denies feeling ill prior or nauseous. He reports he feels fine now has no other symptoms. Denies fever or chills. Advised to monitor symptoms if they return to contact office, they verbalized understanding.

## 2020-08-13 NOTE — PROGRESS NOTES
Patient was admitted to [facility] on  and discharged on  for acute PE/AMS/Sepsis. Patient was contacted within 1 business days of discharge. Top Discharge Challenges to be reviewed by the provider Additional needs identified to be addressed with provider yes Check labs: CBC,CMP,Troponin Discussed COVID-19 related testing which was available at this time. Test results were negative. Patient informed of results, if available? yes Method of communication with provider : chart routing Advance Care Planning:  
Does patient have an Advance Directive:  reviewed and current Inpatient Readmission Risk score: 13% Was this a readmission? no 
Patient stated reason for the admission: weakness, fever and vomiting per wife Patients top risk factors for readmission: functional cognitive ability and medication management Interventions to address risk factors: reviewed meds and discharge instructions with wife, reviewed red flags, encourage fall precautions,hydration,given info on [employer] and CTN contact info. Care Transition Nurse (CTN) contacted the family by telephone to perform post hospital discharge assessment. Verified name and  with family as identifiers. Provided introduction to self, and explanation of the CTN role. Spoke with wife. Said he slept well, still asleep. She is determined to make sure he stays hydrated. Says he has never been one to drink much. But felt like he was stronger when came home from the hospital and not needing the walker at all. Urged to monitor closely and keep it close by in case he does need it. Fall precautions reviewed. CTN reviewed discharge instructions, medical action plan and red flags with family who verbalized understanding. Family given an opportunity to ask questions and does not have any further questions or concerns at this time. The family agrees to contact the PCP office for questions related to their healthcare. Medication reconciliation was performed with family, who verbalizes understanding of administration of home medications. Advised obtaining a 90-day supply of all daily and as-needed medications. Referral to Pharm D needed: no  
 
Home Health/Outpatient orders at discharge: home health care, PT and OT [id number]: [facility] Date of initial visit: 8/15 Durable Medical Equipment ordered at discharge: none [address]: na 
Durable Medical Equipment received: na 
 
Covid Risk Education Patient has following risk factors of: sepsis. Education provided regarding infection prevention, and signs and symptoms of COVID-19 and when to seek medical attention with family who verbalized understanding. Discussed exposure protocols and quarantine From CDC: Are you at higher risk for severe illness?  and given an opportunity for questions and concerns. The family agrees to contact the COVID-19 hotline [phone] or PCP office for questions related to COVID-19. For more information on steps you can take to protect yourself, see CDC's How to Protect Yourself Patient/family/caregiver given information for [employer] and agrees to enroll no Patient's preferred e-mail: declines Patient's preferred phone number: declines Discussed follow-up appointments. If no appointment was previously scheduled, appointment scheduling offered: yes [facility] follow up appointment(s): reminded to schedule f/u with Renee Biswas for 2 weeks. Future Appointments Date Time Provider Judy Gilbert 8/14/2020 To Be Determined Matilda Araiza,  [place] [id number] [email or web address]  
8/19/2020 11:15 AM MD ALVARO Morley AMB  
6/11/2021  2:00 PM Vanda Garcia MD Located within [facility] TOI RYAN AMB [facility] follow up appointment(s):  
Plan for follow-up call in 7-10 days based on severity of symptoms and risk factors. CTN provided contact information for future needs. Goals Addressed This Visit's Progress  Prevent complications post hospitalization. 8/13/2020 [facility] 8/9-8/12 Acute PE/AMS/Sepsis ? Reviewed discharge instructions with wife. ? Reviewed meds with wife- education on new meds using teachback. ? Reviewed red flags: fever,nausea,vomiting,diarrhea,weakness,falls,sob,chest pain. ? Given info on [facility] as resource. ? Given CTN contact info if any questions/concerns. ? Reviewed fall precautions ? Reviewed importance of hydration. ? Advised CTN to check back in about a week, sooner [email or web address] 
  
  COMPLETED: Relieve Constipation 5/30/2019 Called pt's wife, Annie Jones - verified with 2 identifiers. States that [facility] was \"Wonderful! \". They disimpacted Mr. Carrie Morgan and recommended to drink another 1/2 bottle of Mag Citrate. Annie Jones states that he is now [employer] \".  recommended for her to start him on Colace daily along with Metamucil. Pt has not started this yet, but she is going to pharmacy today. She was very appreciative of the recommendation to [facility]. Reviewed ways to get fiber and fluids daily to help prevent constipation. Annie Jones is able to verbalize foods to help and foods to stay away from. No other needs at this time. Tony Sidhu RN, [facility] Ambulatory Navigator, [facility] 5/28/19 [facility] Referral placed for pt with c/o constipation and decreased appetite JACIEL Mackay RN, [facility] Ambulatory Navigator, [facility]

## 2020-08-13 NOTE — TELEPHONE ENCOUNTER
Patient is home from the hospital and on antibiotics. They want to know what probiotics to give him to keep the good bacteria in his gut. Please call.

## 2020-08-14 NOTE — TELEPHONE ENCOUNTER
Attempted to contact Lan Spatz with [facility], unsuccessful.    -Left message on personal vmail per Dr. Alejandra mercado for speech evaluation, to return call to office with questions.

## 2020-08-14 NOTE — TELEPHONE ENCOUNTER
Attempted to contact patient wife Jonathon Musa or daughter Abhijeet Sommer, unsuccessful.    -Left message on Ingrid's personal vmail that per Dr. Juanjose Diego these concerns can be addressed at patients upcoming appt this Wednesday. Advised return call or send mychart message with any questions.

## 2020-08-14 NOTE — TELEPHONE ENCOUNTER
Vito Piedra [phone]    Please call to discuss what med's  would help with incontinence and also she would like to discuss the Parkinson

## 2020-08-14 NOTE — TELEPHONE ENCOUNTER
Autumn Broderick with [facility] [phone]     Requesting a verbal order for speech eval.

## 2020-08-18 NOTE — TELEPHONE ENCOUNTER
Pt's daughter inquiring if Dr. Kristie Mullins can speak with the pt's son(Scottie) prior to the appt on 9/2.         QRA:[phone]

## 2020-08-19 NOTE — PROGRESS NOTES
Sherri Johns is a 80 y.o. male evaluated via telephone on 8/19/2020. Assessment & Plan:  
Diagnoses and all orders for this visit: 
 
1. Urge incontinence -ongoing since the hospital.  Will try low-dose Vesicare and see if it is helpful. He did not have a catheter in the hospital and has no signs or symptoms of UTI. 2. CLL (chronic lymphocytic leukemia) ([facility] [address] -Per oncology. 3. Paroxysmal atrial fibrillation (HCC) -stable. 4. Other acute pulmonary embolism, unspecified whether acute cor pulmonale present (HCC) -at least 3 months of Eliquis. 5. Aspiration into airway, subsequent encounter -continue speech therapy. Of symptoms appear to have resolved. 6. Delirium -appears stable. Will continue home health for now. Other orders 
-     solifenacin (VESICARE) 5 mg tablet; Take 1 Tab by mouth daily. Subjective:  
 
Since last visit: Yes No: no changes. I communicated with the patient and/or health care decision maker today regarding: Presents for a follow-up visit after recent hospital stay for delirium. He was found to have? Bronchitis as well as a pulmonary embolism. He was discharged home on Ceftin and Eliquis as new medications. He has been seen by speech therapy as well as occupational and physical therapy at home. He is doing much better. Does continue to have some incontinence of urine with urgency. No falls. No fevers or chills. No sweats. No nausea or vomiting. The following sections were reviewed and/or updated: 
Patient Active Problem List  
 Diagnosis Date Noted  Acute delirium 08/09/2020  Zenker's diverticulum 06/13/2018  Advanced care planning/counseling discussion 12/21/2016  S/P IVC filter 04/22/2016  GI bleed 04/11/2016  Pulmonary embolism ([facility] [address] 09/01/2015  Hypothyroidism 10/03/2014  CLL (chronic lymphocytic leukemia) ([facility] [address]  Hypercholesterolemia  Hypertension Current Outpatient Medications Medication Sig Dispense Refill  solifenacin (VESICARE) 5 mg tablet Take 1 Tab by mouth daily. 30 Tab 1  
 albuterol (PROVENTIL HFA, VENTOLIN HFA, PROAIR HFA) 90 mcg/actuation inhaler Take 2 Puffs by inhalation every four (4) hours as needed for Wheezing. 1 Inhaler 3  
 apixaban (ELIQUIS) 5 mg tablet Take 2 Tabs by mouth two (2) times a day. For 7 days and then 1 tab PO BID 70 Tab 2  
 hydroCHLOROthiazide (HYDRODIURIL) 25 mg tablet Take 1 tablet by mouth once daily 90 Tab 0  
 levothyroxine (SYNTHROID) 150 mcg tablet Take 1 Tab by mouth Daily (before breakfast). 90 Tab 1  
 amLODIPine (NORVASC) 10 mg tablet Take 5 mg by mouth daily. . Patient taking 1/2 pill daily 90 Tab 0  
 niacinamide 500 mg tablet Take 1 Tab by mouth daily.  aspirin 81 mg chewable tablet Take 81 mg by mouth daily.  omeprazole (PRILOSEC) 40 mg capsule Take 40 mg by mouth daily.  Lactobacillus acidophilus (PROBIOTIC PO) Take 1 Cap by mouth daily.  cyanocobalamin (VITAMIN B-12) 1,000 mcg sublingual tablet Take 1,000 mcg by mouth daily.  co-enzyme Q-10 (CO Q-10) 100 mg capsule Take 100 mg by mouth daily.  MEN'S MULTI-VITAMIN PO Take 2 Each by mouth daily. Chews 2 gummies po once daily.  cholecalciferol, vitamin D3, (VITAMIN D3) 2,000 unit tab Take 1 tablet by mouth daily.  OTHER Combination of Emu oil and pure Hemp Oil extract. 4 drops per day. Theramu. Allergies Allergen Reactions  Other Food Other (comments) AVOIDS OTHER SPICES IN GENERAL DUE SEVERE REACTIONS TO OREGANO AND MUSTARD, crury, worschester  Mustard Anaphylaxis THROAT CLOSES  
 Oregano Anaphylaxis THROAT CLOSES  Zithromax [Azithromycin] Anaphylaxis  Pcn [Penicillins] Unable to [employer] Unsure of reaction.  Watermelon Swelling Lips swelling Past Medical History:  
Diagnosis Date  Asthma   
 outgrew this per pt  Autoimmune disease ([address] CLL  Cancer ([facility] [address] 6/20/14 MULTIPLE SCCAs EXCISED, ALSO BCCAs  CLL (chronic lymphocytic leukemia) ([facility] [address] 2 infusions/states he no longer has this  GERD (gastroesophageal reflux disease)  Hypercholesterolemia  Hypertension  Liver disease 6/6/2014 ADMITTED FOR LIVER DYSFUNCTION, NOT DIAGNOSED, NOW RESOLVED  Pulmonary embolism ([facility] [address] 9/2015  Thyroid disease  Unspecified adverse effect of anesthesia \"out of it for 2 weeks\"  Unspecified sleep apnea   
 had sleep study but no follow up Past Surgical History:  
Procedure Laterality Date  HX ABDOMINAL LAPAROSCOPY  4/2016  HX COLONOSCOPY X2, MOST RECNT WAS IN 2013  HX HEENT    
 tonsillectomy  HX HERNIA REPAIR  4/13/16  
 paraesophageal hernia repair Dr Patric Sue  HX ORTHOPAEDIC Left Frozen shoulder manipulation  HX OTHER SURGICAL    
 moh's procedures - face  DC REVISION OF LOWER EYELID Family History Problem Relation Age of Onset  Heart Disease Mother  Heart Failure Mother  Heart Disease Father  Parkinson's Disease Father  Pneumonia Father  Cancer Sister Lung CA??? - SMOKER  
 Heart Disease Sister  Anesth Problems Neg Hx Social History Tobacco Use  Smoking status: Never Smoker  Smokeless tobacco: Never Used Substance Use Topics  Alcohol use: Yes Comment: 1 oz per day. Consent: 
Leandro Garcia, who was seen by synchronous (real-time) audio only technology, and/or his healthcare decision maker, is aware that this patient-initiated, Telehealth encounter on 8/19/2020 is a billable service. He is aware that he may receive a bill for any such additional services and has provided verbal consent to proceed: Yes. Patient identification was verified prior to start of the visit. A caregiver was present when appropriate.  Due to this being a TeleHealth encounter (during [facility]  emergency), evaluation of the following organ systems was limited: VS/Constitutional/EENT/Resp/CV/GI//MS/Neuro/Skin/Heme-Lymph-Imm. Pursuant to the emergency declaration under the [address] [place] [address], [facility] waiver authority and the [employer] Act, this Virtual Visit was conducted, with patient's (and/or legal guardian's) consent, to reduce the patient's risk of exposure to COVID-19 and provide necessary medical care. Services were provided through a synchronous discussion virtually to substitute for in-person clinic visit. I was in the office. The patient was at home. I affirm this is a Patient Initiated Episode with an Established Patient who has not had a related appointment within my department in the past 7 days or scheduled within the next 24 hours. Total Time: minutes: 17 min Note: not billable if this call serves to triage the patient into an appointment for the relevant concern Roque Boucher MD

## 2020-08-19 NOTE — TELEPHONE ENCOUNTER
Spoke to patient's son, Elizabeth Keith. Name noted on permission to release information. Identifiers x 2. He would like to discuss patient's status with Dr. Daya Ghosh prior to follow up. Informed that Dr. Daya Ghosh out of office this week.

## 2020-08-26 NOTE — TELEPHONE ENCOUNTER
Interventional Cardiology Progress Note    Name: Jose Roberto Layne  : 3/17/1931  MRN: [id number]  Date: 2020    Spoke with . Molly Barrera son, Carol Humphries, who is a cardiologists in [place] [address]. Cell: [phone]     Updated him on recent admission on 8/10/2020 and plans for upcoming office visit. His parents don't always provide him all the information. All questions answered. Provided my number to contact with any questions or concerns.        Danielle Garcia MD  [phone]  20  6:50 PM

## 2020-08-28 NOTE — PROGRESS NOTES
Called and spoke to wife. Norberto Davalos he is in good spirits and seems to be improving each day. Goals  Prevent complications post hospitalization. 8/13/2020 [facility] 8/9-8/12 Acute PE/AMS/Sepsis ? Reviewed discharge instructions with wife. ? Reviewed meds with wife- education on new meds using teachback. ? Reviewed red flags: fever,nausea,vomiting,diarrhea,weakness,falls,sob,chest pain. ? Given info on [facility] as resource. ? Given CTN contact info if any questions/concerns. ? Reviewed fall precautions ? Reviewed importance of hydration. ? Advised CTN to check back in about a week, sooner [email or web address] 
8/28/2020 Wife reports he is getting stronger. Saw speech therapist today and has discharged him. PT will come one more week. No new complaints. Reminded to call if any questions/concerns. mbt

## 2020-09-02 PROBLEM — I21.4 NON-ST ELEVATION MYOCARDIAL INFARCTION (NSTEMI) (HCC): Status: ACTIVE | Noted: 2020-01-01

## 2020-09-02 NOTE — PROGRESS NOTES
Progress Note Patient: Lisa Holland MRN: [id number]  SSN: [id number] YOB: 1931  Age: 80 y.o. Sex: male Subjective:  
 
Mr. Barry Velez is an 79yo  male with recent admission to [facility] with altered mental status and shortness of breath. He had elevated troponin and BNP and was found to have pulmonary embolus on CTA and sepsis. He has a history of CLL in remission, LISANDRA on CPAP, and HTN. Feels much better. No chest pain or shortness of breath. Just tired a lot. Back to normal activity. No fevers. No bleeding issues. Patient reports IVC filter placed 3 years ago. Past Medical History:  
Diagnosis Date  Asthma   
 outgrew this per pt  Autoimmune disease ([facility] [address] CLL  Cancer ([facility] 6/20/14 MULTIPLE SCCAs EXCISED, ALSO BCCAs  CLL (chronic lymphocytic leukemia) ([facility] [address] 2 infusions/states he no longer has this  GERD (gastroesophageal reflux disease)  Hypercholesterolemia  Hypertension  Liver disease 6/6/2014 ADMITTED FOR LIVER DYSFUNCTION, NOT DIAGNOSED, NOW RESOLVED  Pulmonary embolism ([facility] 9/2015  Thyroid disease  Unspecified adverse effect of anesthesia \"out of it for 2 weeks\"  Unspecified sleep apnea   
 had sleep study but no follow up Objective:  
 
Vitals:  
 09/02/20 1436 BP: 124/78 Pulse: 70 Resp: 16 Weight: 66.7 kg (147 lb) Height: 5' 10\" (1.778 m) Physical Exam:  
Head: Normocephalic, atraumatic. Eyes: Pupils equal, round, reactive to light and accomodation, Extra ocular muscles intact. Sclera anicteric. Ears: Grossly responsive to sound. Neck: No adenopathy. No bruits. Throat: No sores or erythema. Heart: Regular rate and rhythm. Normal S1 and S2. No murmurs, gallops, or rub. Lungs: Clear to auscultation bilaterally. No wheezing, rales, or rhonchi. Abdomen: Soft, non-tender. No guarding or rebound. No hepatosplenomegaly. Bowel sounds active. Ext: No edema. No ulceration. Skin: Normal coloration. Warm and dry. No rash. Neuro: Cranial nerves II through XII intact. Motor and sensory grossly intact. Affect: Appropriate. Alert and interactive. Current Outpatient Medications:  
  albuterol (PROVENTIL HFA, VENTOLIN HFA, PROAIR HFA) 90 mcg/actuation inhaler, Take 2 Puffs by inhalation every four (4) hours as needed for Wheezing., Disp: 1 Inhaler, Rfl: 3 
  apixaban (ELIQUIS) 5 mg tablet, Take 2 Tabs by mouth two (2) times a day. For 7 days and then 1 tab PO BID, Disp: 70 Tab, Rfl: 2 
  hydroCHLOROthiazide (HYDRODIURIL) 25 mg tablet, Take 1 tablet by mouth once daily, Disp: 90 Tab, Rfl: 0 
  levothyroxine (SYNTHROID) 150 mcg tablet, Take 1 Tab by mouth Daily (before breakfast). , Disp: 90 Tab, Rfl: 1 
  amLODIPine (NORVASC) 10 mg tablet, Take 5 mg by mouth daily. . Patient taking 1/2 pill daily, Disp: 90 Tab, Rfl: 0 
  Lactobacillus acidophilus (PROBIOTIC PO), Take 1 Cap by mouth daily. , Disp: , Rfl:  
  cyanocobalamin (VITAMIN B-12) 1,000 mcg sublingual tablet, Take 1,000 mcg by mouth daily. , Disp: , Rfl:  
  co-enzyme Q-10 (CO Q-10) 100 mg capsule, Take 100 mg by mouth daily. , Disp: , Rfl:  
  MEN'S MULTI-VITAMIN PO, Take 2 Each by mouth daily. Chews 2 gummies po once daily. , Disp: , Rfl:  
  cholecalciferol, vitamin D3, (VITAMIN D3) 2,000 unit tab, Take 1 tablet by mouth daily. , Disp: , Rfl:  
  solifenacin (VESICARE) 5 mg tablet, Take 1 Tab by mouth daily. , Disp: 30 Tab, Rfl: 1 
  niacinamide 500 mg tablet, Take 1 Tab by mouth daily. , Disp: , Rfl:  
  aspirin 81 mg chewable tablet, Take 81 mg by mouth daily. , Disp: , Rfl:  
  omeprazole (PRILOSEC) 40 mg capsule, Take 40 mg by mouth daily. , Disp: , Rfl:  
  OTHER, Combination of Emu oil and pure Hemp Oil extract. 4 drops per day. Theramu., Disp: , Rfl:   
 
 
Lab/Data Review: 
Labs Latest Ref Rng & Units 8/12/2020 8/11/2020 8/10/2020 8/9/2020 8/9/2020 WBC 4.1 - 11.1 K/uL - 6.9 8.9 - 16. 5(H) RBC 4.10 - 5.70 M/uL - 3.79(L) 3.52(L) - 4.32 Hemoglobin 12.1 - 17.0 g/dL - 11. 8(L) 11. 0(L) - 13.4 Hematocrit 36.6 - 50.3 % - 35. 0(L) 33. 2(L) 41.0 39.6 MCV 80.0 - 99.0 FL - 92.3 94.3 - 91.7 Platelets 627 - 461 K/uL - 101(L) 95(L) - 119(L) Lymphocytes 12 - 49 % - 21 13 - 11(L) Monocytes 5 - 13 % - 6 5 - 4(L) Eosinophils 0 - 7 % - 2 0 - 0 Basophils 0 - 1 % - 0 0 - 0 Albumin 3.5 - 5.0 g/dL - - 2. 5(L) - 3.5 Calcium 8.5 - 10.1 MG/DL 8.5 8.5 8.1(L) - 9.4 Glucose 65 - 100 mg/dL 95 98 96 - 138(H) BUN 6 - 20 MG/DL 10 15 16 - 19 Creatinine 0.70 - 1.30 MG/DL 0.80 0.88 0.92 - 0.99 Sodium 136 - 145 mmol/L 136 137 137 - 136 Potassium 3.5 - 5.1 mmol/L 3.4(L) 3.4(L) 3.6 - 3.5 TSH 0.36 - 3.74 uIU/mL - - - 0.30(L) -  
LDH 85 - 241 U/L - - - 180 - Some recent data might be hidden Lab Results Component Value Date/Time Cholesterol, total 176 06/10/2019 03:37 PM  
 HDL Cholesterol 59 06/10/2019 03:37 PM  
 LDL, calculated 93 06/10/2019 03:37 PM  
 VLDL, calculated 24 06/10/2019 03:37 PM  
 Triglyceride 122 06/10/2019 03:37 PM  
 
Lab Results Component Value Date/Time TSH 0.30 (L) 08/09/2020 08:58 AM  
 
 
08/09/20 ECHO ADULT COMPLETE 08/10/2020 8/10/2020 Narrative · LV: Normal cavity size and systolic function (ejection fraction normal). Mild concentric hypertrophy. Estimated left ventricular ejection fraction  
is 55 - 60%. Visually measured ejection fraction. Wall motion: normal. 
· RV: Not well visualized. · AV: Mild aortic valve regurgitation is present. Signed by: Melanie Centeno MD  
 
8/11/2020 Venous U/S: 
Interpretation Summary · No evidence of lower extremity vein thrombosis in the visualized vein segments bilaterally. · Incidental finding of prominent left groin lymph node. 8/10/2020 MRI brain: 
IMPRESSION:  
1. No acute intracranial abnormality. 2. Mild generalized parenchymal volume loss and mild chronic microvascular 
ischemic disease. 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 3. Moderate left mastoid effusion. 8/10/2020 CTA chest: 
IMPRESSION: 
Tiny filling defects within subsegmental pulmonary arteries in the right lower 
lobe likely represent acute pulmonary emboli. Bibasilar nodular airspace disease 
is similar to the prior study and likely represents chronic bronchiolitis. Small 
bilateral pleural effusions. Old granulomatous disease. Assessment/Plan: ICD-10-CM ICD-9-CM 1. Other pulmonary embolism without acute cor pulmonale, unspecified chronicity ([facility])  I26.99 415.19   
2. Essential hypertension  I10 401.9 3. S/P IVC filter  Z95.828 V45.89   
4. Hypercholesterolemia  E78.00 272.0 5. CLL (chronic lymphocytic leukemia) ([facility])  C91.10 204.10 6. Non-ST elevation myocardial infarction (NSTEMI) ([facility])  I21.4 410.70 Mr. Ian Roman is an 81 yo WM s/p recent pulmonary emboli and sepsis. Non-STEMI in this setting- likely type 2. Normal LV function on echocardiogram. Trivial non-STEMI likely due to PE. Treat conservatively. Discussed length of anticoagulation following PE. They would like to come off Eliquis as soon as possible. RTC 3 Address length of anticoagulation Signed By: Albania Ng MD   
 September 2, 2020

## 2020-10-05 NOTE — TELEPHONE ENCOUNTER
From: Cheyenne Anglin  To: Holly Casillas MD  Sent: 9/5/2020 3:29 PM EDT  Subject: Non-Urgent Medical Question    Good day Sir. This is Eduardo Bonilla and my cell phone number is [phone]. Please call any time if you have any questions or problems with communication / decision making with my father's care. Thank you for your time.

## 2021-01-01 ENCOUNTER — IMMUNIZATION (OUTPATIENT)
Dept: INTERNAL MEDICINE CLINIC | Age: 86
End: 2021-01-01
Payer: MEDICARE

## 2021-01-01 ENCOUNTER — TELEPHONE (OUTPATIENT)
Dept: CARDIOLOGY CLINIC | Age: 86
End: 2021-01-01

## 2021-01-01 ENCOUNTER — OFFICE VISIT (OUTPATIENT)
Dept: CARDIOLOGY CLINIC | Age: 86
End: 2021-01-01
Payer: MEDICARE

## 2021-01-01 ENCOUNTER — HOME CARE VISIT (OUTPATIENT)
Dept: HOME HEALTH SERVICES | Facility: HOME HEALTH | Age: 86
End: 2021-01-01

## 2021-01-01 ENCOUNTER — HOME HEALTH ADMISSION (OUTPATIENT)
Dept: HOME HEALTH SERVICES | Facility: HOME HEALTH | Age: 86
End: 2021-01-01

## 2021-01-01 VITALS
BODY MASS INDEX: 21.19 KG/M2 | HEART RATE: 62 BPM | SYSTOLIC BLOOD PRESSURE: 128 MMHG | WEIGHT: 148 LBS | HEIGHT: 70 IN | RESPIRATION RATE: 18 BRPM | DIASTOLIC BLOOD PRESSURE: 68 MMHG

## 2021-01-01 DIAGNOSIS — I26.99 OTHER PULMONARY EMBOLISM WITHOUT ACUTE COR PULMONALE, UNSPECIFIED CHRONICITY (HCC): ICD-10-CM

## 2021-01-01 DIAGNOSIS — Z95.828 S/P IVC FILTER: ICD-10-CM

## 2021-01-01 DIAGNOSIS — I10 ESSENTIAL HYPERTENSION: Primary | ICD-10-CM

## 2021-01-01 DIAGNOSIS — E78.00 HYPERCHOLESTEROLEMIA: ICD-10-CM

## 2021-01-01 DIAGNOSIS — Z23 ENCOUNTER FOR IMMUNIZATION: Primary | ICD-10-CM

## 2021-01-01 DIAGNOSIS — C91.10 CLL (CHRONIC LYMPHOCYTIC LEUKEMIA) (HCC): ICD-10-CM

## 2021-01-01 PROCEDURE — 91301 COVID-19, MRNA, LNP-S, PF, 100MCG/0.5ML DOSE(MODERNA): CPT | Performed by: FAMILY MEDICINE

## 2021-01-01 PROCEDURE — G0463 HOSPITAL OUTPT CLINIC VISIT: HCPCS | Performed by: INTERNAL MEDICINE

## 2021-01-01 PROCEDURE — 99214 OFFICE O/P EST MOD 30 MIN: CPT | Performed by: INTERNAL MEDICINE

## 2021-01-01 PROCEDURE — 0012A PR IMM ADMN SARSCOV2 100 MCG/0.5 ML 2ND DOSE: CPT | Performed by: FAMILY MEDICINE

## 2021-01-01 PROCEDURE — 0011A COVID-19, MRNA, LNP-S, PF, 100MCG/0.5ML DOSE(MODERNA): CPT | Performed by: FAMILY MEDICINE

## 2021-02-02 NOTE — TELEPHONE ENCOUNTER
Spoke to Beti Rondon. Requesting our office refill eliquis. Patient needs follow up. Requested Prescriptions     Signed Prescriptions Disp Refills    apixaban (ELIQUIS) 5 mg tablet 180 Tab 0     Sig: Take 1 Tab by mouth two (2) times a day. Please schedule follow up with Dr. Dpiak Adamson for further refills. Authorizing Provider: Aleksandr Fitch     Ordering User: Lance Loyola     Verbal order per Dr. Dipak Adamson.

## 2021-02-02 NOTE — TELEPHONE ENCOUNTER
Please call Sonya holland/ Dr. Deja Alexis office in regards to filling Eliquis. Please advise.     Phone#: [phone]  Thanks

## 2021-02-05 NOTE — TELEPHONE ENCOUNTER
Patients wife is calling as the patient was called in a prescription for eliquis and was advised that a 30 day supply would cost over $500 and they can not do that and was wondering if there is something they could take instead until they are able to meet their deductible for the year. Please advise.      Phone: [phone]

## 2021-02-08 NOTE — TELEPHONE ENCOUNTER
Spoke to patient's wife. Name note on permission to release information. Identifiers x 2. She is unsure why the cost was increased. Was told by pharmacist to talk with insurance company. Will call pharmacy for more information. Samples given. Requested Prescriptions     Signed Prescriptions Disp Refills    apixaban (ELIQUIS) 5 mg tablet 42 Tab 0     Sig: Take 1 Tab by mouth two (2) times a day. Please schedule follow up with Dr. Diego Gonzales for further refills. Authorizing Provider: Priscilla Shah     Ordering User: London Santillan     Verbal order per Dr. Diego Gonzales. Follow up scheduled.       Future Appointments   Date Time Provider Judy Gilbert   2/24/2021 10:00 AM MD JACOB Pulido BS AMB   2/25/2021 10:30 AM [facility] INITIAL [facility] BS AMB   6/11/2021  2:00 PM [facility]

## 2021-02-17 NOTE — TELEPHONE ENCOUNTER
Spoke to pharmacist. States only information that he has available, is that deductible needs to be met prior to price reduction. Can call [facility]  [phone]. ID # [id number].

## 2021-02-22 NOTE — TELEPHONE ENCOUNTER
Spoke to [employer] with [employer]. States that cost increase for eliquis is due to deductible has not been met. Once $445 deductible has been met,   A 90 day supply of eliquis will cost $131. To inform patient of the above at upcoming office visit.       Future Appointments   Date Time Provider Judy Gilbert   2/24/2021 10:00 AM MD JACOB Galan BS AMB   2/25/2021 10:30 AM [facility] INITIAL [facility] MAIN BS AMB   6/11/2021  2:00 PM Tammy Cuevas MD [facility] BS AMB   \

## 2021-02-24 NOTE — PROGRESS NOTES
Progress Note Patient: Valdez Spicer MRN: [id number]  SSN: [id number] YOB: 1931  Age: 80 y.o. Sex: male PCP: MD Ilir Umana Subjective:  
 
Mr. Bret Patricia is an 79 yo  male with recent admission to [facility] 8/9-12/2020 with altered mental status and shortness of breath. He had elevated troponin and BNP and was found to have pulmonary embolus on CTA and sepsis. He has a history of CLL in remission, LISANDRA on CPAP, and HTN. Feels much better. No chest pain or shortness of breath. No fevers. No bleeding issues. Patient reports IVC filter placed 3 years ago. Wants to come off Eliquis due to expense. Uses stationary bike at home, otherwise not exercising as much as he should (per wife). Receiving 2nd Covid vaccination tomorrow. Past Medical History:  
Diagnosis Date  Asthma   
 outgrew this per pt  Autoimmune disease ([address] CLL  Cancer ([facility] 6/20/14 MULTIPLE SCCAs EXCISED, ALSO BCCAs  CLL (chronic lymphocytic leukemia) ([address] 2 infusions/states he no longer has this  GERD (gastroesophageal reflux disease)  Hypercholesterolemia  Hypertension  Liver disease 6/6/2014 ADMITTED FOR LIVER DYSFUNCTION, NOT DIAGNOSED, NOW RESOLVED  Pulmonary embolism ([facility] [address] [facility] 9/2015  Thyroid disease  Unspecified adverse effect of anesthesia \"out of it for 2 weeks\"  Unspecified sleep apnea   
 had sleep study but no follow up Objective:  
 
Vitals:  
 02/24/21 1013 BP: 128/68 Pulse: 62 Resp: 18 Weight: 148 lb (67.1 kg) Height: 5' 10\" (1.778 m) Physical Exam:  
Head: Normocephalic, atraumatic. Eyes: Pupils equal, round, reactive to light and accomodation, Extra ocular muscles intact. Sclera anicteric. Ears: Grossly responsive to sound. Neck: No adenopathy. No bruits. Throat: No sores or erythema. Heart: Regular rate and rhythm. Normal S1 and S2. No murmurs, gallops, or rub. 
Lungs: Clear to auscultation bilaterally. No wheezing, rales, or rhonchi. 
Abdomen: Soft, non-tender. No guarding or rebound. No hepatosplenomegaly. Bowel sounds active. 
Ext: No edema. No ulceration. 
Skin: Normal coloration. Warm and dry. No rash. 
Neuro: Cranial nerves II through XII intact. Motor and sensory grossly intact. 
Affect: Appropriate. Alert and interactive.  
 
 
 
Current Outpatient Medications:  
•  albuterol (PROVENTIL HFA, VENTOLIN HFA, PROAIR HFA) 90 mcg/actuation inhaler, Take 2 Puffs by inhalation every four (4) hours as needed for Wheezing., Disp: 1 Inhaler, Rfl: 3 
•  hydroCHLOROthiazide (HYDRODIURIL) 25 mg tablet, Take 1 tablet by mouth once daily, Disp: 90 Tab, Rfl: 0 
•  levothyroxine (SYNTHROID) 150 mcg tablet, Take 1 Tab by mouth Daily (before breakfast)., Disp: 90 Tab, Rfl: 1 
•  amLODIPine (NORVASC) 10 mg tablet, Take 5 mg by mouth daily. . Patient taking 1/2 pill daily, Disp: 90 Tab, Rfl: 0 
•  niacinamide 500 mg tablet, Take 1 Tab by mouth daily., Disp: , Rfl:  
•  Lactobacillus acidophilus (PROBIOTIC PO), Take 1 Cap by mouth daily., Disp: , Rfl:  
•  cyanocobalamin (VITAMIN B-12) 1,000 mcg sublingual tablet, Take 1,000 mcg by mouth daily., Disp: , Rfl:  
•  MEN'S MULTI-VITAMIN PO, Take 2 Each by mouth daily. Chews 2 gummies po once daily., Disp: , Rfl:  
•  cholecalciferol, vitamin D3, (VITAMIN D3) 2,000 unit tab, Take 1 tablet by mouth daily., Disp: , Rfl:  
•  apixaban (ELIQUIS) 5 mg tablet, Take 1 Tab by mouth two (2) times a day. Please schedule follow up with Dr. Moya for further refills. (Patient taking differently: Take 5 mg by mouth two (2) times a day. Please schedule follow up with Dr. Moya for further refills. Pt has been taking 1 a day), Disp: 42 Tab, Rfl: 0 
•  solifenacin (VESICARE) 5 mg tablet, Take 1 Tab by mouth daily., Disp: 30 Tab, Rfl: 1 
   aspirin 81 mg chewable tablet, Take 81 mg by mouth daily. , Disp: , Rfl:  
  omeprazole (PRILOSEC) 40 mg capsule, Take 40 mg by mouth daily. , Disp: , Rfl:  
  OTHER, Combination of Emu oil and pure Hemp Oil extract. 4 drops per day. Theramu., Disp: , Rfl:  
  co-enzyme Q-10 (CO Q-10) 100 mg capsule, Take 100 mg by mouth daily. , Disp: , Rfl:   
 
 
Lab/Data Review: No flowsheet data found. Lab Results Component Value Date/Time Cholesterol, total 176 06/10/2019 03:37 PM  
 HDL Cholesterol 59 06/10/2019 03:37 PM  
 LDL, calculated 93 06/10/2019 03:37 PM  
 VLDL, calculated 24 06/10/2019 03:37 PM  
 Triglyceride 122 06/10/2019 03:37 PM  
 
Lab Results Component Value Date/Time TSH 0.30 (L) 08/09/2020 08:58 AM  
 
 
08/09/20 ECHO ADULT COMPLETE 08/10/2020 8/10/2020 Narrative · LV: Normal cavity size and systolic function (ejection fraction normal). Mild concentric hypertrophy. Estimated left ventricular ejection fraction  
is 55 - 60%. Visually measured ejection fraction. Wall motion: normal. 
· RV: Not well visualized. · AV: Mild aortic valve regurgitation is present. Signed by: Alessia Yu MD  
 
8/11/2020 Venous U/S: 
Interpretation Summary · No evidence of lower extremity vein thrombosis in the visualized vein segments bilaterally. · Incidental finding of prominent left groin lymph node. 8/10/2020 MRI brain: 
IMPRESSION:  
1. No acute intracranial abnormality. 2. Mild generalized parenchymal volume loss and mild chronic microvascular 
ischemic disease. 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 3. Moderate left mastoid effusion. 8/10/2020 CTA chest: 
IMPRESSION: 
Tiny filling defects within subsegmental pulmonary arteries in the right lower 
lobe likely represent acute pulmonary emboli. Bibasilar nodular airspace disease 
is similar to the prior study and likely represents chronic bronchiolitis. Small bilateral pleural effusions. Old granulomatous disease. Assessment/Plan: ICD-10-CM ICD-9-CM 1. Essential hypertension  I10 401.9 2. Other pulmonary embolism without acute cor pulmonale, unspecified chronicity (HCC)  I26.99 415.19   
3. CLL (chronic lymphocytic leukemia) ([facility])  C91.10 204.10   
4. S/P IVC filter  Z95.828 V45.89   
5. Hypercholesterolemia  E78.00 272.0 Mr. Nick Alejo is an 81 yo WM s/p recent pulmonary emboli and sepsis. Type 2 Non-STEMI in this setting. Normal LV function on echocardiogram. Trivial non-STEMI likely due to PE. Treat conservatively. They would like to come off Eliquis as soon as possible. Transition to baby aspirin. Understands risk of recurrent thromboembolic disease. CLL in remission, so tremayne should be acceptable. Discussed need for exercise and strengthening to reduce risk of falls. He and his wife voice understanding. RTC 6 to ensure stability. Follows with Dr. Shelbie Real in the interim. Signed By: Gina Ness MD   
 February 24, 2021

## 2023-01-01 NOTE — PATIENT INSTRUCTIONS
Finish up the Eliquis you have and then stop. At that point please take Aspirin 81 mg daily I will SWITCH the dose or number of times a day I take the medications listed below when I get home from the hospital:  None

## 2023-11-08 NOTE — TELEPHONE ENCOUNTER
[phone]      Thank you for sending him to PT. He is doing better.  Please call to discuss his water intake that is the only problem
Unable to contact patient at this time. Left message to return call.
3

## (undated) DEVICE — ENDO CARRY-ON PROCEDURE KIT INCLUDES ENZYMATIC SPONGE, GAUZE, BIOHAZARD LABEL, TRAY, LUBRICANT, DIRTY SCOPE LABEL, WATER LABEL, TRAY, DRAWSTRING PAD, AND DEFENDO 4-PIECE KIT.: Brand: ENDO CARRY-ON PROCEDURE KIT

## (undated) DEVICE — SYR ASSEMB INFL BLLN 60ML --

## (undated) DEVICE — BW-412T DISP COMBO CLEANING BRUSH: Brand: SINGLE USE COMBINATION CLEANING BRUSH

## (undated) DEVICE — Device: Brand: MEDICAL ACTION INDUSTRIES

## (undated) DEVICE — KENDALL RADIOLUCENT FOAM MONITORING ELECTRODE -RECTANGULAR SHAPE: Brand: KENDALL

## (undated) DEVICE — CATH IV AUTOGRD BC BLU 22GA 25 -- INSYTE

## (undated) DEVICE — CANN NASAL O2 CAPNOGRAPHY AD -- FILTERLINE

## (undated) DEVICE — NEEDLE HYPO 18GA L1.5IN PNK S STL HUB POLYPR SHLD REG BVL

## (undated) DEVICE — SET ADMIN 16ML TBNG L100IN 2 Y INJ SITE IV PIGGY BK DISP

## (undated) DEVICE — ESOPHAGEAL/PYLORIC/COLONIC/BILIARY WIREGUIDED BALLOON DILATATION CATHETER: Brand: CRE™ PRO

## (undated) DEVICE — Z DISCONTINUED NO SUB IDED SET EXTN W/ 4 W STPCOCK M SPIN LOK 36IN

## (undated) DEVICE — Device

## (undated) DEVICE — 1200 GUARD II KIT W/5MM TUBE W/O VAC TUBE: Brand: GUARDIAN

## (undated) DEVICE — BITE BLK ENDOSCP AD 54FR GRN POLYETH ENDOSCP W STRP SLD

## (undated) DEVICE — BAG BELONG PT PERS CLEAR HANDL

## (undated) DEVICE — KIT IV STRT W CHLORAPREP PD 1ML

## (undated) DEVICE — SYRINGE MED 20ML STD CLR PLAS LUERLOCK TIP N CTRL DISP

## (undated) DEVICE — CUFF BLD PRSS AD SM SZ 10 FOR 20-26CM LIMB VLY SFT W/O TUBE

## (undated) DEVICE — SOLIDIFIER FLUID 3000 CC ABSORB

## (undated) DEVICE — NEONATAL-ADULT SPO2 SENSOR: Brand: NELLCOR